# Patient Record
Sex: MALE | Race: WHITE | Employment: OTHER | URBAN - METROPOLITAN AREA
[De-identification: names, ages, dates, MRNs, and addresses within clinical notes are randomized per-mention and may not be internally consistent; named-entity substitution may affect disease eponyms.]

---

## 2016-12-30 RX ORDER — SODIUM CHLORIDE 9 MG/ML
100 INJECTION, SOLUTION INTRAVENOUS CONTINUOUS
Status: DISPENSED | OUTPATIENT
Start: 2017-01-05 | End: 2017-01-05

## 2016-12-30 RX ORDER — ACETAMINOPHEN 325 MG/1
650 TABLET ORAL
Status: ACTIVE | OUTPATIENT
Start: 2017-01-05 | End: 2017-01-05

## 2016-12-30 RX ORDER — DEXAMETHASONE SODIUM PHOSPHATE 4 MG/ML
12 INJECTION, SOLUTION INTRA-ARTICULAR; INTRALESIONAL; INTRAMUSCULAR; INTRAVENOUS; SOFT TISSUE ONCE
Status: ACTIVE | OUTPATIENT
Start: 2017-01-05 | End: 2017-01-06

## 2016-12-30 RX ORDER — PALONOSETRON 0.05 MG/ML
0.25 INJECTION, SOLUTION INTRAVENOUS ONCE
Status: ACTIVE | OUTPATIENT
Start: 2017-01-05 | End: 2017-01-06

## 2016-12-30 RX ORDER — DIPHENHYDRAMINE HYDROCHLORIDE 50 MG/ML
50 INJECTION, SOLUTION INTRAMUSCULAR; INTRAVENOUS
Status: ACTIVE | OUTPATIENT
Start: 2017-01-05 | End: 2017-01-05

## 2016-12-30 RX ORDER — DIPHENHYDRAMINE HYDROCHLORIDE 50 MG/ML
50 INJECTION, SOLUTION INTRAMUSCULAR; INTRAVENOUS ONCE
Status: ACTIVE | OUTPATIENT
Start: 2017-01-05 | End: 2017-01-05

## 2016-12-30 RX ORDER — ACETAMINOPHEN 325 MG/1
650 TABLET ORAL ONCE
Status: ACTIVE | OUTPATIENT
Start: 2017-01-05 | End: 2017-01-05

## 2017-01-02 RX ORDER — DEXAMETHASONE SODIUM PHOSPHATE 4 MG/ML
12 INJECTION, SOLUTION INTRA-ARTICULAR; INTRALESIONAL; INTRAMUSCULAR; INTRAVENOUS; SOFT TISSUE ONCE
Status: ACTIVE | OUTPATIENT
Start: 2017-01-06 | End: 2017-01-06

## 2017-01-02 RX ORDER — SODIUM CHLORIDE 9 MG/ML
100 INJECTION, SOLUTION INTRAVENOUS CONTINUOUS
Status: DISPENSED | OUTPATIENT
Start: 2017-01-06 | End: 2017-01-06

## 2017-01-05 ENCOUNTER — OFFICE VISIT (OUTPATIENT)
Dept: ONCOLOGY | Age: 72
End: 2017-01-05

## 2017-01-05 ENCOUNTER — HOSPITAL ENCOUNTER (OUTPATIENT)
Dept: INFUSION THERAPY | Age: 72
Discharge: HOME | End: 2017-01-05
Payer: MEDICARE

## 2017-01-05 VITALS
DIASTOLIC BLOOD PRESSURE: 87 MMHG | SYSTOLIC BLOOD PRESSURE: 149 MMHG | BODY MASS INDEX: 31.19 KG/M2 | OXYGEN SATURATION: 95 % | TEMPERATURE: 95.6 F | HEART RATE: 67 BPM | WEIGHT: 222.8 LBS | RESPIRATION RATE: 20 BRPM | HEIGHT: 71 IN

## 2017-01-05 VITALS
HEART RATE: 64 BPM | RESPIRATION RATE: 18 BRPM | BODY MASS INDEX: 31.36 KG/M2 | DIASTOLIC BLOOD PRESSURE: 82 MMHG | WEIGHT: 224 LBS | TEMPERATURE: 96.8 F | SYSTOLIC BLOOD PRESSURE: 139 MMHG | HEIGHT: 71 IN

## 2017-01-05 DIAGNOSIS — D69.6 THROMBOCYTOPENIA (HCC): ICD-10-CM

## 2017-01-05 DIAGNOSIS — C83.18 MANTLE CELL LYMPHOMA OF LYMPH NODES OF MULTIPLE REGIONS (HCC): Primary | ICD-10-CM

## 2017-01-05 DIAGNOSIS — I82.403: ICD-10-CM

## 2017-01-05 DIAGNOSIS — D64.9 ANEMIA, UNSPECIFIED TYPE: ICD-10-CM

## 2017-01-05 DIAGNOSIS — R60.0 BILATERAL EDEMA OF LOWER EXTREMITY: ICD-10-CM

## 2017-01-05 LAB
ALBUMIN SERPL BCP-MCNC: 2.9 G/DL (ref 3.5–5)
ALBUMIN/GLOB SERPL: 0.8 {RATIO} (ref 1.1–2.2)
ALP SERPL-CCNC: 86 U/L (ref 45–117)
ALT SERPL-CCNC: 8 U/L (ref 12–78)
ANION GAP BLD CALC-SCNC: 7 MMOL/L (ref 5–15)
AST SERPL W P-5'-P-CCNC: 16 U/L (ref 15–37)
BASOPHILS # BLD AUTO: 0 K/UL (ref 0–0.1)
BASOPHILS # BLD: 0 % (ref 0–1)
BILIRUB SERPL-MCNC: 0.5 MG/DL (ref 0.2–1)
BUN SERPL-MCNC: 20 MG/DL (ref 6–20)
BUN/CREAT SERPL: 23 (ref 12–20)
CALCIUM SERPL-MCNC: 8 MG/DL (ref 8.5–10.1)
CHLORIDE SERPL-SCNC: 106 MMOL/L (ref 97–108)
CO2 SERPL-SCNC: 28 MMOL/L (ref 21–32)
CREAT SERPL-MCNC: 0.88 MG/DL (ref 0.7–1.3)
EOSINOPHIL # BLD: 0.1 K/UL (ref 0–0.4)
EOSINOPHIL NFR BLD: 3 % (ref 0–7)
ERYTHROCYTE [DISTWIDTH] IN BLOOD BY AUTOMATED COUNT: 16.2 % (ref 11.5–14.5)
GLOBULIN SER CALC-MCNC: 3.6 G/DL (ref 2–4)
GLUCOSE SERPL-MCNC: 86 MG/DL (ref 65–100)
HCT VFR BLD AUTO: 33.2 % (ref 36.6–50.3)
HGB BLD-MCNC: 10.5 G/DL (ref 12.1–17)
LYMPHOCYTES # BLD AUTO: 33 % (ref 12–49)
LYMPHOCYTES # BLD: 0.9 K/UL (ref 0.8–3.5)
MCH RBC QN AUTO: 26.8 PG (ref 26–34)
MCHC RBC AUTO-ENTMCNC: 31.6 G/DL (ref 30–36.5)
MCV RBC AUTO: 84.7 FL (ref 80–99)
MONOCYTES # BLD: 0.2 K/UL (ref 0–1)
MONOCYTES NFR BLD AUTO: 9 % (ref 5–13)
NEUTS SEG # BLD: 1.4 K/UL (ref 1.8–8)
NEUTS SEG NFR BLD AUTO: 55 % (ref 32–75)
PLATELET # BLD AUTO: 71 K/UL (ref 150–400)
POTASSIUM SERPL-SCNC: 4.2 MMOL/L (ref 3.5–5.1)
PROT SERPL-MCNC: 6.5 G/DL (ref 6.4–8.2)
RBC # BLD AUTO: 3.92 M/UL (ref 4.1–5.7)
SODIUM SERPL-SCNC: 141 MMOL/L (ref 136–145)
WBC # BLD AUTO: 2.6 K/UL (ref 4.1–11.1)

## 2017-01-05 PROCEDURE — 36415 COLL VENOUS BLD VENIPUNCTURE: CPT | Performed by: INTERNAL MEDICINE

## 2017-01-05 PROCEDURE — 77030012965 HC NDL HUBR BBMI -A

## 2017-01-05 PROCEDURE — 80053 COMPREHEN METABOLIC PANEL: CPT | Performed by: INTERNAL MEDICINE

## 2017-01-05 PROCEDURE — 74011250636 HC RX REV CODE- 250/636

## 2017-01-05 PROCEDURE — 99211 OFF/OP EST MAY X REQ PHY/QHP: CPT

## 2017-01-05 PROCEDURE — 74011000250 HC RX REV CODE- 250

## 2017-01-05 PROCEDURE — 85025 COMPLETE CBC W/AUTO DIFF WBC: CPT | Performed by: INTERNAL MEDICINE

## 2017-01-05 RX ORDER — SODIUM CHLORIDE 9 MG/ML
10 INJECTION INTRAMUSCULAR; INTRAVENOUS; SUBCUTANEOUS AS NEEDED
Status: ACTIVE | OUTPATIENT
Start: 2017-01-05 | End: 2017-01-06

## 2017-01-05 RX ORDER — SODIUM CHLORIDE 0.9 % (FLUSH) 0.9 %
10 SYRINGE (ML) INJECTION AS NEEDED
Status: ACTIVE | OUTPATIENT
Start: 2017-01-05 | End: 2017-01-06

## 2017-01-05 RX ORDER — HEPARIN 100 UNIT/ML
500 SYRINGE INTRAVENOUS AS NEEDED
Status: ACTIVE | OUTPATIENT
Start: 2017-01-05 | End: 2017-01-06

## 2017-01-05 RX ADMIN — Medication 10 ML: at 10:53

## 2017-01-05 RX ADMIN — SODIUM CHLORIDE 10 ML: 9 INJECTION, SOLUTION INTRAMUSCULAR; INTRAVENOUS; SUBCUTANEOUS at 10:50

## 2017-01-05 RX ADMIN — Medication 10 ML: at 12:00

## 2017-01-05 RX ADMIN — Medication 10 ML: at 10:52

## 2017-01-05 RX ADMIN — SODIUM CHLORIDE, PRESERVATIVE FREE 500 UNITS: 5 INJECTION INTRAVENOUS at 12:00

## 2017-01-05 NOTE — MR AVS SNAPSHOT
Visit Information Date & Time Provider Department Dept. Phone Encounter #  
 1/5/2017  9:45 AM Kiersten Billy NP [address] at [address] [place] [address] [phone] [id number] Follow-up Instructions Return in 4 weeks (on 2/2/2017) for roscoe Shin R-Ben #3. Your Appointments 2/2/2017  9:15 AM  
ESTABLISHED PATIENT with Kiersten Billy NP  
[facility] at [address] [place] [address] [facility] [facility]) Appt Note: roscoe Shin R-Ben #3  
 [facility], [address] [facility] 99 [id number]  
[phone]  
  
   
 [facility], [address] [place] [address] Upcoming Health Maintenance Date Due DTaP/Tdap/Td series (1 - Tdap) 7/5/1964 ZOSTER VACCINE AGE 60> 7/5/2003 GLAUCOMA SCREENING Q2Y 7/5/2008 Pneumococcal 65+ High/Highest Risk (1 of 2 - PCV13) 7/5/2008 MEDICARE YEARLY EXAM 7/5/2008 INFLUENZA AGE 9 TO ADULT 8/1/2016 FOBT Q 1 YEAR AGE 50-75 11/11/2017 Allergies as of 1/5/2017  Review Complete On: 1/5/2017 By: Kiersten Billy NP No Known Allergies Current Immunizations  Reviewed on 12/9/2016 No immunizations on file. Not reviewed this visit You Were Diagnosed With   
  
 Codes Comments Mantle cell lymphoma of lymph nodes of multiple regions [facility])    -  Primary ICD-10-CM: C83.18 
ICD-9-CM: 200.48 Thromboembolism of deep veins of lower extremity, bilateral (HCC)     ICD-10-CM: I82.403 ICD-9-CM: 453.40 Thrombocytopenia ([facility] [address]     ICD-10-CM: D69.6 ICD-9-CM: 287.5 Anemia, unspecified type     ICD-10-CM: D64.9 ICD-9-CM: 285.9 Bilateral edema of lower extremity     ICD-10-CM: R60.0 ICD-9-CM: 559. 3 Vitals BP Pulse Temp Resp Height(growth percentile) 149/87 (BP 1 Location: Right arm, BP Patient Position: Sitting) 67 95.6 °F (35.3 °C) (Temporal) 20 5' 11\" (1.803 m) Weight(growth percentile) SpO2 BMI Smoking Status 222 lb 12.8 oz (101.1 kg) 95% 31.07 kg/m2 Former Smoker BMI and BSA Data Body Mass Index Body Surface Area 31.07 kg/m 2 2.25 m 2 Preferred Pharmacy Pharmacy Name Phone [facility], [address] [phone] Your Updated Medication List  
  
   
This list is accurate as of: 1/5/17 10:24 AM.  Always use your most recent med list.  
  
  
  
  
 enoxaparin 120 mg/0.8 mL injection Commonly known as:  LOVENOX  
120 mg by SubCUTAneous route every twelve (12) hours. escitalopram oxalate 10 mg tablet Commonly known as:  Wyn Glenna Take 1 Tab by mouth daily. ferrous sulfate 325 mg (65 mg iron) tablet Take 1 Tab by mouth two (2) times daily (with meals). lidocaine-prilocaine topical cream  
Commonly known as:  EMLA Apply  to affected area as needed for Pain (Apply 30-60 min. prior to having your port accessed). ondansetron hcl 8 mg tablet Commonly known as:  Maxwell Line Take 1 Tab by mouth every eight (8) hours as needed for Nausea. prochlorperazine 10 mg tablet Commonly known as:  COMPAZINE Take 1 Tab by mouth every six (6) hours as needed for Nausea. Follow-up Instructions Return in 4 weeks (on 2/2/2017) for roscoe Shin R-Ben #3. To-Do List   
 01/05/2017 10:30 AM  
  Appointment with [address] at [place] [address] ([phone])  01/06/2017 11:00 AM  
  Appointment with [address] at [place] [address] ([phone])  
  
 02/02/2017 10:00 AM  
  Appointment with [address] at [place] [address] ([phone])  
  
 02/03/2017 10:00 AM  
  Appointment with [address] at [place] [address] ([phone])  
  
 03/02/2017 10:00 AM  
  Appointment with [address] at [place] [address] ([phone])  
  
 03/03/2017 10:00 AM  
 Appointment with [address] at [place] [address] ([phone]) Introducing [facility] SERVICES! Dear Aston Lance: Thank you for requesting a [employer] account. Our records indicate that you already have an active [employer] account. You can access your account anytime at [email or web address] Did you know that you can access your hospital and ER discharge instructions at any time in [employer]? You can also review all of your test results from your hospital stay or ER visit. Additional Information If you have questions, please visit the Frequently Asked Questions section of the [employer] website at [email or web address]. Remember, [employer] is NOT to be used for urgent needs. For medical emergencies, dial 911. Now available from your iPhone and Android! Please provide this summary of care documentation to your next provider. Your primary care clinician is listed as Martín Carl. If you have any questions after today's visit, please call [phone].

## 2017-01-05 NOTE — PROGRESS NOTES
[facility] VISIT NOTE    4882  Pt arrived at [facility] ambulatory and in no distress for C2D1 Rituxan + Bendamustine. Patient saw Dr. Zahida Avery prior to [facility] appointment. Assessment completed, pt c/o mild fatigue, but no other complaints and he says he tolerated the first cycle well. Blood pressure 139/82, pulse 64, temperature 96.8 °F (36 °C), resp. rate 18, height 5' 11\" (1.803 m), weight 101.6 kg (224 lb). Right chest port accessed with 1 in guallpa no difficulty. Positive blood return noted and labs drawn. Recent Results (from the past 12 hour(s))   CBC WITH AUTOMATED DIFF    Collection Time: 01/05/17 10:51 AM   Result Value Ref Range    WBC 2.6 (L) 4.1 - 11.1 K/uL    RBC 3.92 (L) 4.10 - 5.70 M/uL    HGB 10.5 (L) 12.1 - 17.0 g/dL    HCT 33.2 (L) 36.6 - 50.3 %    MCV 84.7 80.0 - 99.0 FL    MCH 26.8 26.0 - 34.0 PG    MCHC 31.6 30.0 - 36.5 g/dL    RDW 16.2 (H) 11.5 - 14.5 %    PLATELET 71 (L) 843 - 400 K/uL    NEUTROPHILS 55 32 - 75 %    LYMPHOCYTES 33 12 - 49 %    MONOCYTES 9 5 - 13 %    EOSINOPHILS 3 0 - 7 %    BASOPHILS 0 0 - 1 %    ABS. NEUTROPHILS 1.4 (L) 1.8 - 8.0 K/UL    ABS. LYMPHOCYTES 0.9 0.8 - 3.5 K/UL    ABS. MONOCYTES 0.2 0.0 - 1.0 K/UL    ABS. EOSINOPHILS 0.1 0.0 - 0.4 K/UL    ABS. BASOPHILS 0.0 0.0 - 0.1 K/UL   METABOLIC PANEL, COMPREHENSIVE    Collection Time: 01/05/17 10:51 AM   Result Value Ref Range    Sodium 141 136 - 145 mmol/L    Potassium 4.2 3.5 - 5.1 mmol/L    Chloride 106 97 - 108 mmol/L    CO2 28 21 - 32 mmol/L    Anion gap 7 5 - 15 mmol/L    Glucose 86 65 - 100 mg/dL    BUN 20 6 - 20 MG/DL    Creatinine 0.88 0.70 - 1.30 MG/DL    BUN/Creatinine ratio 23 (H) 12 - 20      GFR est AA >60 >60 ml/min/1.73m2    GFR est non-AA >60 >60 ml/min/1.73m2    Calcium 8.0 (L) 8.5 - 10.1 MG/DL    Bilirubin, total 0.5 0.2 - 1.0 MG/DL    ALT 8 (L) 12 - 78 U/L    AST 16 15 - 37 U/L    Alk.  phosphatase 86 45 - 117 U/L    Protein, total 6.5 6.4 - 8.2 g/dL    Albumin 2.9 (L) 3.5 - 5.0 g/dL    Globulin 3.6 2.0 - 4.0 g/dL    A-G Ratio 0.8 (L) 1.1 - 2.2       Discussed low platelet result with Héctor Yao RN for Dr. Anna Arriaga and the plan is to hold C2D1 today and retry in one week. Port de-accessed and flushed per protocol. Positive blood return noted. 1200  D/C'd from [facility] ambulatory and in no distress accompanied by his daughter.  Next appointment is 1/6/16 at 11am.

## 2017-01-05 NOTE — PROGRESS NOTES
[id number] [facility] at [facility]  [phone]    Hematology / Oncology Follow Up    Reason for Visit:   Alal Schaefer is a 66 y.o. male who is seen for follow up of lymphoma. Treatment History:   · CTA Chest 11/11/2016: PE in left lower lobe pulmonary arteries, extensive adneopathy  · CT A/P 11/12/2016: Cirrhosis, massive splenomegaly, ascites, massive lymphadenopathy  · US guided axillary node biopsy 11/14/2016: CD5+ B-cell non-hodgkin lymphoma, phenotype most consistent with mantle cell lymphoma. FISH with t(11;14)  · PET-CT 11/23/2016: Marked splenomegaly with increased metabolic activity consistent with lymphoma. Bilateral cervical and axillary adenopathy. Mediastinal and right hilar and left diaphragmatic adenopathy. Bilateral pelvic and inguinal adenopathy. Multiple small mesenteric and retroperitoneal nodes with low grade or no activity. · BMBx 11/29/2016: Hypercellular marrow involved by mantle cell lymphoma  · Stage III Mantle Cell Lymphoma  · Palliative chemotherapy with Rituximab and Bendamustine beginning 12/8/2016    History of Present Illness:   Here today for follow up. He states he is feeling well. He reports tolerating therapy very well. Minimal fatigue for a few days after treatment, no other symptoms. Leg swelling slightly better. Eating and drinking well. Breathing well. Denies fevers, chills, night sweats. Continues on lovenox without bleeding. No complaints today. PAST HISTORY: The following sections were reviewed and updated in the EMR as appropriate: PMH, SH, FH, Medications, Allergies. No Known Allergies   Review of Systems: A complete review of systems was obtained, reviewed, and scanned into the EMR. Pertinent findings reviewed above.       Physical Exam:     Visit Vitals    /87 (BP 1 Location: Right arm, BP Patient Position: Sitting)    Pulse 67    Temp 95.6 °F (35.3 °C) (Temporal)    Resp 20    Ht 5' 11\" (1.803 m)    Wt 222 lb 12.8 oz (101.1 kg)    SpO2 95%    BMI 31.07 kg/m2     ECOG PS: 1  General: No distress  Eyes: PERRLA, anicteric sclerae  HENT: Atraumatic, OP clear  Neck: Supple  Lymphatic: No cervical, supraclavicular, or inguinal adenopathy  Respiratory: CTAB, normal respiratory effort  CV: Normal rate, regular rhythm, no murmurs. Bilateral LE edema, 2+  GI: Soft, nontender, nondistended, no masses, no hepatomegaly, no splenomegaly  MS: Normal gait and station. Digits without clubbing or cyanosis. Skin: No rashes, ecchymoses, or petechiae. Normal temperature, turgor, and texture. Psych: Alert, oriented, appropriate affect, normal judgment/insight    Results:     Lab Results   Component Value Date/Time    WBC 2.6 01/05/2017 10:51 AM    HGB 10.5 01/05/2017 10:51 AM    HCT 33.2 01/05/2017 10:51 AM    PLATELET 71 97/52/7472 10:51 AM    MCV 84.7 01/05/2017 10:51 AM    ABS. NEUTROPHILS 1.4 01/05/2017 10:51 AM    Hemoglobin (POC) 10.2 12/08/2016 10:15 AM    Hematocrit (POC) 30 12/08/2016 10:15 AM     Lab Results   Component Value Date/Time    Sodium 141 01/05/2017 10:51 AM    Potassium 4.2 01/05/2017 10:51 AM    Chloride 106 01/05/2017 10:51 AM    CO2 28 01/05/2017 10:51 AM    Glucose 86 01/05/2017 10:51 AM    BUN 20 01/05/2017 10:51 AM    Creatinine 0.88 01/05/2017 10:51 AM    GFR est AA >60 01/05/2017 10:51 AM    GFR est non-AA >60 01/05/2017 10:51 AM    Calcium 8.0 01/05/2017 10:51 AM    Sodium (POC) 140 12/08/2016 10:15 AM    Potassium (POC) 4.0 12/08/2016 10:15 AM    Chloride (POC) 100 12/08/2016 10:15 AM    Glucose (POC) 75 12/08/2016 10:15 AM    BUN (POC) 17 12/08/2016 10:15 AM    Creatinine (POC) 1.0 12/08/2016 10:15 AM    Calcium, ionized (POC) 1.10 12/08/2016 10:15 AM     Lab Results   Component Value Date/Time    Bilirubin, total 0.5 01/05/2017 10:51 AM    ALT 8 01/05/2017 10:51 AM    AST 16 01/05/2017 10:51 AM    Alk.  phosphatase 86 01/05/2017 10:51 AM    Protein, total 6.5 01/05/2017 10:51 AM    Albumin 2.9 01/05/2017 10:51 AM    Globulin 3.6 01/05/2017 10:51 AM     Lab Results   Component Value Date/Time    Reticulocyte count 1.8 11/10/2016 02:00 PM    Iron % saturation 11 11/10/2016 02:00 PM    Iron 26 11/10/2016 02:00 PM    TIBC 240 11/10/2016 02:00 PM    Ferritin 100 11/10/2016 02:00 PM    Vitamin B12 415 11/10/2016 02:00 PM    Folate 15.6 11/10/2016 02:00 PM    Haptoglobin 186 11/11/2016 12:04 PM     11/10/2016 02:00 PM    Beta-2 Microglobulin, serum 4.4 11/15/2016 05:21 AM    TSH 4.06 11/10/2016 11:21 AM    Hep C  virus Ab Interp. NONREACTIVE 08/02/2016 08:54 AM     Lab Results   Component Value Date/Time    INR 1.1 11/11/2016 12:04 PM    aPTT 31.3 11/11/2016 12:04 PM    Fibrinogen 215 11/11/2016 12:04 PM       HepB/C negative  TTE 11/10/2016: EF 60%    Bone marrow biopsy 11/29/2016: Hypercellular marrow involved by mantle cell lymphoma    Assessment:   1) Mantle cell lymphoma  Stage IV  His cancer is not curable and management is with palliative intent. Bone marrow results are positive for marrow involvement, increasing this to stage IV. His MIPI score is 6.24, which is High Risk and cooresponds to a median survival of 37 months and 5-year OS of 20%. He is currently on chemotherapy with R-Bendamustine with plans for 6 cycles, potentially followed by maintenance rituximab. He tolerated first cycle very well. However, worsening thrombocytopenia now. Not sure how much of this is due to treatment vs his splenomegaly and cirrhosis. We will hold therapy today and try again next week, may need to treat regardless of counts if still low. 2) Depression  Stable. Continue lexapro. 3) Cirrhosis  Newly diagnosed. Unclear if this is related to the lymphoma. GI following. 4) DVT, PE  Diagnosed 11/11/2016. Malignancy associated. Continue lovenox bid. Symptoms improving. 5) Anemia  Secondary to #1, and perhaps iron deficiency. Improving on oral iron, continue at once a day. Monitor, repeat labs today.     6) Thrombocytopenia  Secondary to splenomegaly. Monitor and treat lymphoma.      Plan:     · Delay C#2 R-Bendamustine to next week  · Labs prior to each treatment: CBC with diff, CMP on day 1  · Antiemetic prophylaxis: Ondansetron and Dexamethasone on day 1 and 2  · Infusion reaction prophylaxis: acetaminophen and diphenhydramine prior to Rituximab  · PRN antiemetics at home: Ondansetron, Prochlorparazine  · Neulasta 24-72 hours after each cycle  · Continue iron supplement  · Continue lovenox 1mg/kig BID  · Return to clinic in 5 weeks with next cycle of therapy      Signed By: Pattie Gibson MD     January 5, 2017

## 2017-01-06 ENCOUNTER — HOSPITAL ENCOUNTER (OUTPATIENT)
Dept: INFUSION THERAPY | Age: 72
Discharge: HOME | End: 2017-01-06
Payer: MEDICARE

## 2017-01-09 RX ORDER — DIPHENHYDRAMINE HYDROCHLORIDE 50 MG/ML
50 INJECTION, SOLUTION INTRAMUSCULAR; INTRAVENOUS
Status: ACTIVE | OUTPATIENT
Start: 2017-01-12 | End: 2017-01-12

## 2017-01-09 RX ORDER — DEXAMETHASONE SODIUM PHOSPHATE 4 MG/ML
12 INJECTION, SOLUTION INTRA-ARTICULAR; INTRALESIONAL; INTRAMUSCULAR; INTRAVENOUS; SOFT TISSUE ONCE
Status: COMPLETED | OUTPATIENT
Start: 2017-01-12 | End: 2017-01-12

## 2017-01-09 RX ORDER — DIPHENHYDRAMINE HYDROCHLORIDE 50 MG/ML
50 INJECTION, SOLUTION INTRAMUSCULAR; INTRAVENOUS ONCE
Status: COMPLETED | OUTPATIENT
Start: 2017-01-12 | End: 2017-01-12

## 2017-01-09 RX ORDER — ACETAMINOPHEN 325 MG/1
650 TABLET ORAL
Status: ACTIVE | OUTPATIENT
Start: 2017-01-12 | End: 2017-01-12

## 2017-01-09 RX ORDER — SODIUM CHLORIDE 9 MG/ML
100 INJECTION, SOLUTION INTRAVENOUS CONTINUOUS
Status: DISPENSED | OUTPATIENT
Start: 2017-01-13 | End: 2017-01-13

## 2017-01-09 RX ORDER — PALONOSETRON 0.05 MG/ML
0.25 INJECTION, SOLUTION INTRAVENOUS ONCE
Status: COMPLETED | OUTPATIENT
Start: 2017-01-12 | End: 2017-01-12

## 2017-01-09 RX ORDER — SODIUM CHLORIDE 9 MG/ML
100 INJECTION, SOLUTION INTRAVENOUS CONTINUOUS
Status: DISPENSED | OUTPATIENT
Start: 2017-01-12 | End: 2017-01-12

## 2017-01-09 RX ORDER — DEXAMETHASONE SODIUM PHOSPHATE 4 MG/ML
12 INJECTION, SOLUTION INTRA-ARTICULAR; INTRALESIONAL; INTRAMUSCULAR; INTRAVENOUS; SOFT TISSUE ONCE
Status: COMPLETED | OUTPATIENT
Start: 2017-01-13 | End: 2017-01-13

## 2017-01-09 RX ORDER — ACETAMINOPHEN 325 MG/1
650 TABLET ORAL ONCE
Status: COMPLETED | OUTPATIENT
Start: 2017-01-12 | End: 2017-01-12

## 2017-01-12 ENCOUNTER — HOSPITAL ENCOUNTER (OUTPATIENT)
Dept: INFUSION THERAPY | Age: 72
Discharge: HOME | End: 2017-01-12
Payer: MEDICARE

## 2017-01-12 VITALS
OXYGEN SATURATION: 90 % | BODY MASS INDEX: 31.67 KG/M2 | HEART RATE: 61 BPM | HEIGHT: 71 IN | TEMPERATURE: 97.2 F | DIASTOLIC BLOOD PRESSURE: 77 MMHG | WEIGHT: 226.2 LBS | RESPIRATION RATE: 18 BRPM | SYSTOLIC BLOOD PRESSURE: 120 MMHG

## 2017-01-12 LAB
ALBUMIN SERPL BCP-MCNC: 2.7 G/DL (ref 3.5–5)
ALBUMIN/GLOB SERPL: 0.8 {RATIO} (ref 1.1–2.2)
ALP SERPL-CCNC: 85 U/L (ref 45–117)
ALT SERPL-CCNC: 15 U/L (ref 12–78)
ANION GAP BLD CALC-SCNC: 18 MMOL/L (ref 5–15)
AST SERPL W P-5'-P-CCNC: 15 U/L (ref 15–37)
BASOPHILS # BLD AUTO: 0 K/UL (ref 0–0.1)
BASOPHILS # BLD: 1 % (ref 0–1)
BILIRUB DIRECT SERPL-MCNC: 0.1 MG/DL (ref 0–0.2)
BILIRUB SERPL-MCNC: 0.3 MG/DL (ref 0.2–1)
BUN BLD-MCNC: 23 MG/DL (ref 9–20)
CA-I BLD-MCNC: 1.19 MMOL/L (ref 1.12–1.32)
CHLORIDE BLD-SCNC: 104 MMOL/L (ref 98–107)
CO2 BLD-SCNC: 26 MMOL/L (ref 21–32)
CREAT BLD-MCNC: 0.8 MG/DL (ref 0.6–1.3)
DIFFERENTIAL METHOD BLD: ABNORMAL
EOSINOPHIL # BLD: 0.1 K/UL (ref 0–0.4)
EOSINOPHIL NFR BLD: 7 % (ref 0–7)
ERYTHROCYTE [DISTWIDTH] IN BLOOD BY AUTOMATED COUNT: 16.7 % (ref 11.5–14.5)
GLOBULIN SER CALC-MCNC: 3.6 G/DL (ref 2–4)
GLUCOSE BLD-MCNC: 156 MG/DL (ref 75–110)
HCT VFR BLD AUTO: 34.1 % (ref 36.6–50.3)
HCT VFR BLD CALC: 32 % (ref 36.6–50.3)
HGB BLD-MCNC: 10.1 G/DL (ref 12.1–17)
HGB BLD-MCNC: 10.9 GM/DL (ref 12.1–17)
LYMPHOCYTES # BLD AUTO: 36 % (ref 12–49)
LYMPHOCYTES # BLD: 0.7 K/UL (ref 0.8–3.5)
MCH RBC QN AUTO: 25.9 PG (ref 26–34)
MCHC RBC AUTO-ENTMCNC: 29.6 G/DL (ref 30–36.5)
MCV RBC AUTO: 87.4 FL (ref 80–99)
MONOCYTES # BLD: 0.2 K/UL (ref 0–1)
MONOCYTES NFR BLD AUTO: 8 % (ref 5–13)
NEUTS SEG # BLD: 1 K/UL (ref 1.8–8)
NEUTS SEG NFR BLD AUTO: 48 % (ref 32–75)
PLATELET # BLD AUTO: 154 K/UL (ref 150–400)
POTASSIUM BLD-SCNC: 4 MMOL/L (ref 3.5–5.1)
PROT SERPL-MCNC: 6.3 G/DL (ref 6.4–8.2)
RBC # BLD AUTO: 3.9 M/UL (ref 4.1–5.7)
RBC MORPH BLD: ABNORMAL
RBC MORPH BLD: ABNORMAL
SERVICE CMNT-IMP: ABNORMAL
SODIUM BLD-SCNC: 143 MMOL/L (ref 136–145)
WBC # BLD AUTO: 2 K/UL (ref 4.1–11.1)

## 2017-01-12 PROCEDURE — 74011000250 HC RX REV CODE- 250: Performed by: INTERNAL MEDICINE

## 2017-01-12 PROCEDURE — 80076 HEPATIC FUNCTION PANEL: CPT | Performed by: INTERNAL MEDICINE

## 2017-01-12 PROCEDURE — 96413 CHEMO IV INFUSION 1 HR: CPT

## 2017-01-12 PROCEDURE — 74011250637 HC RX REV CODE- 250/637: Performed by: INTERNAL MEDICINE

## 2017-01-12 PROCEDURE — 74011250636 HC RX REV CODE- 250/636

## 2017-01-12 PROCEDURE — 96411 CHEMO IV PUSH ADDL DRUG: CPT

## 2017-01-12 PROCEDURE — 96409 CHEMO IV PUSH SNGL DRUG: CPT

## 2017-01-12 PROCEDURE — 74011250636 HC RX REV CODE- 250/636: Performed by: INTERNAL MEDICINE

## 2017-01-12 PROCEDURE — 85025 COMPLETE CBC W/AUTO DIFF WBC: CPT | Performed by: INTERNAL MEDICINE

## 2017-01-12 PROCEDURE — 77030012965 HC NDL HUBR BBMI -A

## 2017-01-12 PROCEDURE — 36415 COLL VENOUS BLD VENIPUNCTURE: CPT | Performed by: INTERNAL MEDICINE

## 2017-01-12 PROCEDURE — 74011000258 HC RX REV CODE- 258: Performed by: INTERNAL MEDICINE

## 2017-01-12 PROCEDURE — 80047 BASIC METABLC PNL IONIZED CA: CPT

## 2017-01-12 PROCEDURE — 96375 TX/PRO/DX INJ NEW DRUG ADDON: CPT

## 2017-01-12 RX ORDER — SODIUM CHLORIDE 9 MG/ML
10 INJECTION INTRAMUSCULAR; INTRAVENOUS; SUBCUTANEOUS AS NEEDED
Status: DISPENSED | OUTPATIENT
Start: 2017-01-12 | End: 2017-01-13

## 2017-01-12 RX ORDER — HEPARIN 100 UNIT/ML
500 SYRINGE INTRAVENOUS AS NEEDED
Status: ACTIVE | OUTPATIENT
Start: 2017-01-12 | End: 2017-01-13

## 2017-01-12 RX ORDER — SODIUM CHLORIDE 0.9 % (FLUSH) 0.9 %
10-40 SYRINGE (ML) INJECTION AS NEEDED
Status: ACTIVE | OUTPATIENT
Start: 2017-01-12 | End: 2017-01-13

## 2017-01-12 RX ADMIN — SODIUM CHLORIDE 100 ML/HR: 900 INJECTION, SOLUTION INTRAVENOUS at 09:26

## 2017-01-12 RX ADMIN — BENDAMUSTINE HYDROCHLORIDE 210 MG: 25 INJECTION, SOLUTION INTRAVENOUS at 12:30

## 2017-01-12 RX ADMIN — PALONOSETRON HYDROCHLORIDE 0.25 MG: 0.25 INJECTION INTRAVENOUS at 09:28

## 2017-01-12 RX ADMIN — DIPHENHYDRAMINE HYDROCHLORIDE 50 MG: 50 INJECTION INTRAMUSCULAR; INTRAVENOUS at 09:32

## 2017-01-12 RX ADMIN — Medication 10 ML: at 12:55

## 2017-01-12 RX ADMIN — RITUXIMAB 875 MG: 10 INJECTION, SOLUTION INTRAVENOUS at 10:50

## 2017-01-12 RX ADMIN — ACETAMINOPHEN 650 MG: 325 TABLET ORAL at 09:29

## 2017-01-12 RX ADMIN — HEPARIN 500 UNITS: 100 SYRINGE at 12:55

## 2017-01-12 RX ADMIN — DEXAMETHASONE SODIUM PHOSPHATE 12 MG: 4 INJECTION, SOLUTION INTRA-ARTICULAR; INTRALESIONAL; INTRAMUSCULAR; INTRAVENOUS; SOFT TISSUE at 09:40

## 2017-01-12 RX ADMIN — SODIUM CHLORIDE 10 ML: 9 INJECTION, SOLUTION INTRAMUSCULAR; INTRAVENOUS; SUBCUTANEOUS at 09:36

## 2017-01-12 NOTE — PROGRESS NOTES
[facility] VISIT NOTE    0740  Pt arrived at [facility] ambulatory and in no distress for C2D1 Rituxan + Bendamustine. Assessment completed, pt  Has no new concern. Chemotherapy Flowsheet 1/12/2017   Cycle C2D1   Date 1/12/2017   Drug / Regimen Rituxan/Bendamuistine   Notes -       Right chest port accessed with 0.75 in guallpa with no difficulty. Positive blood return noted and labs drawn. Recent Results (from the past 12 hour(s))   HEPATIC FUNCTION PANEL    Collection Time: 01/12/17  7:59 AM   Result Value Ref Range    Protein, total 6.3 (L) 6.4 - 8.2 g/dL    Albumin 2.7 (L) 3.5 - 5.0 g/dL    Globulin 3.6 2.0 - 4.0 g/dL    A-G Ratio 0.8 (L) 1.1 - 2.2      Bilirubin, total 0.3 0.2 - 1.0 MG/DL    Bilirubin, direct 0.1 0.0 - 0.2 MG/DL    Alk. phosphatase 85 45 - 117 U/L    AST 15 15 - 37 U/L    ALT 15 12 - 78 U/L   CBC WITH AUTOMATED DIFF    Collection Time: 01/12/17  7:59 AM   Result Value Ref Range    WBC 2.0 (L) 4.1 - 11.1 K/uL    RBC 3.90 (L) 4.10 - 5.70 M/uL    HGB 10.1 (L) 12.1 - 17.0 g/dL    HCT 34.1 (L) 36.6 - 50.3 %    MCV 87.4 80.0 - 99.0 FL    MCH 25.9 (L) 26.0 - 34.0 PG    MCHC 29.6 (L) 30.0 - 36.5 g/dL    RDW 16.7 (H) 11.5 - 14.5 %    PLATELET 753 143 - 033 K/uL    NEUTROPHILS 48 32 - 75 %    LYMPHOCYTES 36 12 - 49 %    MONOCYTES 8 5 - 13 %    EOSINOPHILS 7 0 - 7 %    BASOPHILS 1 0 - 1 %    ABS. NEUTROPHILS 1.0 (L) 1.8 - 8.0 K/UL    ABS. LYMPHOCYTES 0.7 (L) 0.8 - 3.5 K/UL    ABS. MONOCYTES 0.2 0.0 - 1.0 K/UL    ABS. EOSINOPHILS 0.1 0.0 - 0.4 K/UL    ABS.  BASOPHILS 0.0 0.0 - 0.1 K/UL    DF SMEAR SCANNED      RBC COMMENTS ANISOCYTOSIS  1+        RBC COMMENTS OVALOCYTES  PRESENT       POC CHEM8    Collection Time: 01/12/17  8:02 AM   Result Value Ref Range    Calcium, ionized (POC) 1.19 1.12 - 1.32 MMOL/L    Sodium (POC) 143 136 - 145 MMOL/L    Potassium (POC) 4.0 3.5 - 5.1 MMOL/L    Chloride (POC) 104 98 - 107 MMOL/L    CO2 (POC) 26 21 - 32 MMOL/L    Anion gap (POC) 18 (H) 5 - 15 mmol/L    Glucose (POC) 156 (H) 75 - 110 MG/DL    BUN (POC) 23 (H) 9 - 20 MG/DL    Creatinine (POC) 0.8 0.6 - 1.3 MG/DL    GFR-AA (POC) >60 >60 ml/min/1.73m2    GFR, non-AA (POC) >60 >60 ml/min/1.73m2    Hemoglobin (POC) 10.9 (L) 12.1 - 17.0 GM/DL    Hematocrit (POC) 32 (L) 36.6 - 50.3 %    Comment Comment Not Indicated. Medications received:  Acetaminophen PO  Diphenhydramine IV  Palonosetron IV  Dexamethasone IV  Rituximab IV  Bendamustine IVPB    Tolerated treatment well, no adverse reaction noted. Portflushed per protocol and Wrapped. Positive blood return noted. Patient Vitals for the past 12 hrs:   Temp Pulse Resp BP SpO2   01/12/17 1258 97.2 °F (36.2 °C) 61 18 120/77 -   01/12/17 1220 97 °F (36.1 °C) (!) 55 16 132/82 -   01/12/17 1150 97.5 °F (36.4 °C) (!) 58 16 117/77 -   01/12/17 1120 97 °F (36.1 °C) 60 16 128/83 90 %   01/12/17 0742 97.4 °F (36.3 °C) 88 18 (!) 160/97 -       1300  D/C'd from [facility] ambulatory and in no distress accompanied by Daughter . Next appointment is 1/13/2017at 0730.

## 2017-01-13 ENCOUNTER — HOSPITAL ENCOUNTER (OUTPATIENT)
Dept: INFUSION THERAPY | Age: 72
Discharge: HOME | End: 2017-01-13
Payer: MEDICARE

## 2017-01-13 VITALS
TEMPERATURE: 97.7 F | SYSTOLIC BLOOD PRESSURE: 115 MMHG | RESPIRATION RATE: 18 BRPM | DIASTOLIC BLOOD PRESSURE: 73 MMHG | HEART RATE: 68 BPM | OXYGEN SATURATION: 94 %

## 2017-01-13 PROCEDURE — 96413 CHEMO IV INFUSION 1 HR: CPT

## 2017-01-13 PROCEDURE — 74011000258 HC RX REV CODE- 258: Performed by: INTERNAL MEDICINE

## 2017-01-13 PROCEDURE — 74011250636 HC RX REV CODE- 250/636

## 2017-01-13 PROCEDURE — 96375 TX/PRO/DX INJ NEW DRUG ADDON: CPT

## 2017-01-13 PROCEDURE — 74011250636 HC RX REV CODE- 250/636: Performed by: INTERNAL MEDICINE

## 2017-01-13 RX ORDER — HEPARIN 100 UNIT/ML
500 SYRINGE INTRAVENOUS AS NEEDED
Status: ACTIVE | OUTPATIENT
Start: 2017-01-13 | End: 2017-01-14

## 2017-01-13 RX ORDER — SODIUM CHLORIDE 0.9 % (FLUSH) 0.9 %
5-10 SYRINGE (ML) INJECTION AS NEEDED
Status: ACTIVE | OUTPATIENT
Start: 2017-01-13 | End: 2017-01-14

## 2017-01-13 RX ADMIN — SODIUM CHLORIDE 100 ML/HR: 900 INJECTION, SOLUTION INTRAVENOUS at 08:02

## 2017-01-13 RX ADMIN — BENDAMUSTINE HYDROCHLORIDE 210 MG: 25 INJECTION, SOLUTION INTRAVENOUS at 09:05

## 2017-01-13 RX ADMIN — Medication 10 ML: at 09:36

## 2017-01-13 RX ADMIN — HEPARIN 500 UNITS: 100 SYRINGE at 09:37

## 2017-01-13 RX ADMIN — DEXAMETHASONE SODIUM PHOSPHATE 12 MG: 4 INJECTION, SOLUTION INTRA-ARTICULAR; INTRALESIONAL; INTRAMUSCULAR; INTRAVENOUS; SOFT TISSUE at 08:02

## 2017-01-13 NOTE — PROGRESS NOTES
[facility] Progress Note    Date: 2017    Name: Ramiro Levi    MRN: [id number]         : 1945    Mr. Hernandez Arrived ambulatory and in no distress for Bendamustine cycle 2 day 2 of Rituxan/Bendamustine regimen. Assessment was completed, no acute issues at this time, no new complaints voiced. Port accessed 17, needle and dressing intact, brisk blood return. Chemotherapy Flowsheet 2017   Cycle C2D2   Date 2017   Drug / Regimen Rituxan/Bendamustine   Notes -     Patient Vitals for the past 12 hrs:   Temp Pulse Resp BP SpO2   17 0935 97.7 °F (36.5 °C) 68 18 115/73 94 %   17 0743 98.4 °F (36.9 °C) 73 22 129/76 93 %     Pre-medications  were administered as ordered and chemotherapy was initiated:  Dexamethasone 12 mg IV  Bendamustine 210 mg IV      Mr. Pradeep Almeida tolerated treatment well and was discharged from [place] [address] in stable condition at [address]. He is to return on 17 at 1100 for his next appointment.     Cesar Ovalle RN  2017

## 2017-01-13 NOTE — PROGRESS NOTES
Problem: Chemotherapy Treatment  Goal: *Chemotherapy regimen followed  Outcome: Progressing Towards Goal  Rituxan/Bendamustine C2D2. Pt denies any questions or concerns.

## 2017-02-02 ENCOUNTER — APPOINTMENT (OUTPATIENT)
Dept: INFUSION THERAPY | Age: 72
End: 2017-02-02
Payer: MEDICARE

## 2017-02-03 ENCOUNTER — APPOINTMENT (OUTPATIENT)
Dept: INFUSION THERAPY | Age: 72
End: 2017-02-03
Payer: MEDICARE

## 2017-02-06 RX ORDER — DEXAMETHASONE SODIUM PHOSPHATE 4 MG/ML
12 INJECTION, SOLUTION INTRA-ARTICULAR; INTRALESIONAL; INTRAMUSCULAR; INTRAVENOUS; SOFT TISSUE ONCE
Status: COMPLETED | OUTPATIENT
Start: 2017-02-09 | End: 2017-02-09

## 2017-02-06 RX ORDER — SODIUM CHLORIDE 9 MG/ML
100 INJECTION, SOLUTION INTRAVENOUS CONTINUOUS
Status: DISPENSED | OUTPATIENT
Start: 2017-02-09 | End: 2017-02-09

## 2017-02-06 RX ORDER — DIPHENHYDRAMINE HYDROCHLORIDE 50 MG/ML
50 INJECTION, SOLUTION INTRAMUSCULAR; INTRAVENOUS ONCE
Status: COMPLETED | OUTPATIENT
Start: 2017-02-09 | End: 2017-02-09

## 2017-02-06 RX ORDER — PALONOSETRON 0.05 MG/ML
0.25 INJECTION, SOLUTION INTRAVENOUS ONCE
Status: COMPLETED | OUTPATIENT
Start: 2017-02-09 | End: 2017-02-09

## 2017-02-06 RX ORDER — ACETAMINOPHEN 325 MG/1
650 TABLET ORAL
Status: ACTIVE | OUTPATIENT
Start: 2017-02-09 | End: 2017-02-09

## 2017-02-06 RX ORDER — ACETAMINOPHEN 325 MG/1
650 TABLET ORAL ONCE
Status: COMPLETED | OUTPATIENT
Start: 2017-02-09 | End: 2017-02-09

## 2017-02-06 RX ORDER — DIPHENHYDRAMINE HYDROCHLORIDE 50 MG/ML
50 INJECTION, SOLUTION INTRAMUSCULAR; INTRAVENOUS
Status: ACTIVE | OUTPATIENT
Start: 2017-02-09 | End: 2017-02-09

## 2017-02-07 RX ORDER — DEXAMETHASONE SODIUM PHOSPHATE 4 MG/ML
12 INJECTION, SOLUTION INTRA-ARTICULAR; INTRALESIONAL; INTRAMUSCULAR; INTRAVENOUS; SOFT TISSUE ONCE
Status: COMPLETED | OUTPATIENT
Start: 2017-02-10 | End: 2017-02-10

## 2017-02-07 RX ORDER — SODIUM CHLORIDE 9 MG/ML
100 INJECTION, SOLUTION INTRAVENOUS CONTINUOUS
Status: DISPENSED | OUTPATIENT
Start: 2017-02-10 | End: 2017-02-10

## 2017-02-09 ENCOUNTER — HOSPITAL ENCOUNTER (OUTPATIENT)
Dept: INFUSION THERAPY | Age: 72
Discharge: HOME | End: 2017-02-09
Payer: MEDICARE

## 2017-02-09 ENCOUNTER — OFFICE VISIT (OUTPATIENT)
Dept: ONCOLOGY | Age: 72
End: 2017-02-09

## 2017-02-09 VITALS
TEMPERATURE: 97 F | OXYGEN SATURATION: 94 % | SYSTOLIC BLOOD PRESSURE: 115 MMHG | WEIGHT: 221.3 LBS | BODY MASS INDEX: 30.98 KG/M2 | DIASTOLIC BLOOD PRESSURE: 63 MMHG | RESPIRATION RATE: 18 BRPM | HEIGHT: 71 IN | HEART RATE: 64 BPM

## 2017-02-09 VITALS
WEIGHT: 220 LBS | DIASTOLIC BLOOD PRESSURE: 83 MMHG | BODY MASS INDEX: 31.5 KG/M2 | RESPIRATION RATE: 16 BRPM | OXYGEN SATURATION: 92 % | HEART RATE: 70 BPM | HEIGHT: 70 IN | TEMPERATURE: 96.3 F | SYSTOLIC BLOOD PRESSURE: 150 MMHG

## 2017-02-09 DIAGNOSIS — D69.6 THROMBOCYTOPENIA (HCC): ICD-10-CM

## 2017-02-09 DIAGNOSIS — C83.18 MANTLE CELL LYMPHOMA OF LYMPH NODES OF MULTIPLE REGIONS (HCC): Primary | ICD-10-CM

## 2017-02-09 DIAGNOSIS — T45.1X5A ANTINEOPLASTIC CHEMOTHERAPY INDUCED ANEMIA: ICD-10-CM

## 2017-02-09 DIAGNOSIS — D64.81 ANTINEOPLASTIC CHEMOTHERAPY INDUCED ANEMIA: ICD-10-CM

## 2017-02-09 DIAGNOSIS — I26.99 OTHER ACUTE PULMONARY EMBOLISM WITHOUT ACUTE COR PULMONALE (HCC): ICD-10-CM

## 2017-02-09 DIAGNOSIS — I82.403: ICD-10-CM

## 2017-02-09 DIAGNOSIS — R16.1 SPLENOMEGALY: ICD-10-CM

## 2017-02-09 LAB
ALBUMIN SERPL BCP-MCNC: 2.8 G/DL (ref 3.5–5)
ALBUMIN/GLOB SERPL: 0.9 {RATIO} (ref 1.1–2.2)
ALP SERPL-CCNC: 75 U/L (ref 45–117)
ALT SERPL-CCNC: 18 U/L (ref 12–78)
ANION GAP BLD CALC-SCNC: 4 MMOL/L (ref 5–15)
AST SERPL W P-5'-P-CCNC: 16 U/L (ref 15–37)
BASOPHILS # BLD AUTO: 0 K/UL (ref 0–0.1)
BASOPHILS # BLD: 1 % (ref 0–1)
BILIRUB DIRECT SERPL-MCNC: 0.2 MG/DL (ref 0–0.2)
BILIRUB SERPL-MCNC: 0.5 MG/DL (ref 0.2–1)
BUN SERPL-MCNC: 18 MG/DL (ref 6–20)
BUN/CREAT SERPL: 23 (ref 12–20)
CALCIUM SERPL-MCNC: 7.9 MG/DL (ref 8.5–10.1)
CHLORIDE SERPL-SCNC: 104 MMOL/L (ref 97–108)
CO2 SERPL-SCNC: 31 MMOL/L (ref 21–32)
CREAT SERPL-MCNC: 0.8 MG/DL (ref 0.7–1.3)
DIFFERENTIAL METHOD BLD: ABNORMAL
EOSINOPHIL # BLD: 0.2 K/UL (ref 0–0.4)
EOSINOPHIL NFR BLD: 6 % (ref 0–7)
ERYTHROCYTE [DISTWIDTH] IN BLOOD BY AUTOMATED COUNT: 17.1 % (ref 11.5–14.5)
GLOBULIN SER CALC-MCNC: 3.2 G/DL (ref 2–4)
GLUCOSE SERPL-MCNC: 90 MG/DL (ref 65–100)
HCT VFR BLD AUTO: 33.7 % (ref 36.6–50.3)
HGB BLD-MCNC: 10.6 G/DL (ref 12.1–17)
LYMPHOCYTES # BLD AUTO: 9 % (ref 12–49)
LYMPHOCYTES # BLD: 0.4 K/UL (ref 0.8–3.5)
MCH RBC QN AUTO: 27.2 PG (ref 26–34)
MCHC RBC AUTO-ENTMCNC: 31.5 G/DL (ref 30–36.5)
MCV RBC AUTO: 86.6 FL (ref 80–99)
MONOCYTES # BLD: 0.8 K/UL (ref 0–1)
MONOCYTES NFR BLD AUTO: 19 % (ref 5–13)
NEUTS SEG # BLD: 2.6 K/UL (ref 1.8–8)
NEUTS SEG NFR BLD AUTO: 65 % (ref 32–75)
PLATELET # BLD AUTO: 162 K/UL (ref 150–400)
POTASSIUM SERPL-SCNC: 4 MMOL/L (ref 3.5–5.1)
PROT SERPL-MCNC: 6 G/DL (ref 6.4–8.2)
RBC # BLD AUTO: 3.89 M/UL (ref 4.1–5.7)
RBC MORPH BLD: ABNORMAL
SODIUM SERPL-SCNC: 139 MMOL/L (ref 136–145)
WBC # BLD AUTO: 4 K/UL (ref 4.1–11.1)

## 2017-02-09 PROCEDURE — 96409 CHEMO IV PUSH SNGL DRUG: CPT

## 2017-02-09 PROCEDURE — 74011250636 HC RX REV CODE- 250/636

## 2017-02-09 PROCEDURE — 74011250636 HC RX REV CODE- 250/636: Performed by: INTERNAL MEDICINE

## 2017-02-09 PROCEDURE — 96411 CHEMO IV PUSH ADDL DRUG: CPT

## 2017-02-09 PROCEDURE — 36415 COLL VENOUS BLD VENIPUNCTURE: CPT | Performed by: INTERNAL MEDICINE

## 2017-02-09 PROCEDURE — 77030012965 HC NDL HUBR BBMI -A

## 2017-02-09 PROCEDURE — 74011000258 HC RX REV CODE- 258: Performed by: INTERNAL MEDICINE

## 2017-02-09 PROCEDURE — 85025 COMPLETE CBC W/AUTO DIFF WBC: CPT | Performed by: INTERNAL MEDICINE

## 2017-02-09 PROCEDURE — 96415 CHEMO IV INFUSION ADDL HR: CPT

## 2017-02-09 PROCEDURE — 74011250637 HC RX REV CODE- 250/637: Performed by: INTERNAL MEDICINE

## 2017-02-09 PROCEDURE — 96413 CHEMO IV INFUSION 1 HR: CPT

## 2017-02-09 PROCEDURE — 96375 TX/PRO/DX INJ NEW DRUG ADDON: CPT

## 2017-02-09 PROCEDURE — 80076 HEPATIC FUNCTION PANEL: CPT | Performed by: INTERNAL MEDICINE

## 2017-02-09 PROCEDURE — 80048 BASIC METABOLIC PNL TOTAL CA: CPT | Performed by: INTERNAL MEDICINE

## 2017-02-09 PROCEDURE — 74011000250 HC RX REV CODE- 250

## 2017-02-09 RX ORDER — SODIUM CHLORIDE 0.9 % (FLUSH) 0.9 %
10-40 SYRINGE (ML) INJECTION AS NEEDED
Status: ACTIVE | OUTPATIENT
Start: 2017-02-09 | End: 2017-02-10

## 2017-02-09 RX ORDER — HEPARIN 100 UNIT/ML
500 SYRINGE INTRAVENOUS AS NEEDED
Status: ACTIVE | OUTPATIENT
Start: 2017-02-09 | End: 2017-02-10

## 2017-02-09 RX ORDER — SODIUM CHLORIDE 9 MG/ML
10 INJECTION INTRAMUSCULAR; INTRAVENOUS; SUBCUTANEOUS AS NEEDED
Status: DISPENSED | OUTPATIENT
Start: 2017-02-09 | End: 2017-02-10

## 2017-02-09 RX ADMIN — HEPARIN 500 UNITS: 100 SYRINGE at 15:41

## 2017-02-09 RX ADMIN — PALONOSETRON HYDROCHLORIDE 0.25 MG: 0.25 INJECTION INTRAVENOUS at 12:32

## 2017-02-09 RX ADMIN — SODIUM CHLORIDE 10 ML: 9 INJECTION, SOLUTION INTRAMUSCULAR; INTRAVENOUS; SUBCUTANEOUS at 11:12

## 2017-02-09 RX ADMIN — BENDAMUSTINE HYDROCHLORIDE 210 MG: 25 INJECTION, SOLUTION INTRAVENOUS at 15:05

## 2017-02-09 RX ADMIN — SODIUM CHLORIDE 100 ML/HR: 900 INJECTION, SOLUTION INTRAVENOUS at 12:23

## 2017-02-09 RX ADMIN — DIPHENHYDRAMINE HYDROCHLORIDE 50 MG: 50 INJECTION INTRAMUSCULAR; INTRAVENOUS at 12:26

## 2017-02-09 RX ADMIN — Medication 10 ML: at 15:41

## 2017-02-09 RX ADMIN — ACETAMINOPHEN 650 MG: 325 TABLET ORAL at 12:25

## 2017-02-09 RX ADMIN — Medication 20 ML: at 11:14

## 2017-02-09 RX ADMIN — DEXAMETHASONE SODIUM PHOSPHATE 12 MG: 4 INJECTION, SOLUTION INTRA-ARTICULAR; INTRALESIONAL; INTRAMUSCULAR; INTRAVENOUS; SOFT TISSUE at 12:33

## 2017-02-09 RX ADMIN — RITUXIMAB 875 MG: 10 INJECTION, SOLUTION INTRAVENOUS at 13:20

## 2017-02-09 NOTE — PROGRESS NOTES
[id number] [facility] Oncology at [facility]  [phone]    Hematology / Oncology Follow Up    Reason for Visit:   Prince Murillo is a 66 y.o. male who is seen for follow up of lymphoma. Treatment History:   · CTA Chest 11/11/2016: PE in left lower lobe pulmonary arteries, extensive adneopathy  · CT A/P 11/12/2016: Cirrhosis, massive splenomegaly, ascites, massive lymphadenopathy  · US guided axillary node biopsy 11/14/2016: CD5+ B-cell non-hodgkin lymphoma, phenotype most consistent with mantle cell lymphoma. FISH with t(11;14)  · PET-CT 11/23/2016: Marked splenomegaly with increased metabolic activity consistent with lymphoma. Bilateral cervical and axillary adenopathy. Mediastinal and right hilar and left diaphragmatic adenopathy. Bilateral pelvic and inguinal adenopathy. Multiple small mesenteric and retroperitoneal nodes with low grade or no activity. · BMBx 11/29/2016: Hypercellular marrow involved by mantle cell lymphoma  · Stage III Mantle Cell Lymphoma  · Palliative chemotherapy with Rituximab and Bendamustine beginning 12/8/2016    History of Present Illness:   He reports a new mild cough that started today, minimally productive, improved over the course of the day. No fevers. Mild BOND, no worse. Leg swelling improved. Mild fatigue. PAST HISTORY: The following sections were reviewed and updated in the EMR as appropriate: PMH, SH, FH, Medications, Allergies. No Known Allergies   Review of Systems: A complete review of systems was obtained, reviewed, and scanned into the EMR. Pertinent findings reviewed above.       Physical Exam:     Visit Vitals    /83 (BP 1 Location: Right arm, BP Patient Position: Sitting)    Pulse 70    Temp 96.3 °F (35.7 °C) (Temporal)    Resp 16    Ht 5' 10\" (1.778 m)    Wt 220 lb (99.8 kg)    SpO2 92%    BMI 31.57 kg/m2     ECOG PS: 1  General: No distress  Eyes: PERRLA, anicteric sclerae  HENT: Atraumatic, OP clear  Neck: Supple  Lymphatic: No cervical, supraclavicular, or inguinal adenopathy  Respiratory: CTAB, normal respiratory effort  CV: Normal rate, regular rhythm, no murmurs. Bilateral LE edema, 1+  GI: Soft, nontender, nondistended, no masses, no hepatomegaly, no splenomegaly  MS: Normal gait and station. Digits without clubbing or cyanosis. Skin: No rashes, ecchymoses, or petechiae. Normal temperature, turgor, and texture. Psych: Alert, oriented, appropriate affect, normal judgment/insight    Results:     Lab Results   Component Value Date/Time    WBC 4.0 02/09/2017 11:27 AM    HGB 10.6 02/09/2017 11:27 AM    HCT 33.7 02/09/2017 11:27 AM    PLATELET 333 86/99/6997 11:27 AM    MCV 86.6 02/09/2017 11:27 AM    ABS. NEUTROPHILS 2.6 02/09/2017 11:27 AM    Hemoglobin (POC) 10.9 01/12/2017 08:02 AM    Hematocrit (POC) 32 01/12/2017 08:02 AM     Lab Results   Component Value Date/Time    Sodium 139 02/09/2017 11:14 AM    Potassium 4.0 02/09/2017 11:14 AM    Chloride 104 02/09/2017 11:14 AM    CO2 31 02/09/2017 11:14 AM    Glucose 90 02/09/2017 11:14 AM    BUN 18 02/09/2017 11:14 AM    Creatinine 0.80 02/09/2017 11:14 AM    GFR est AA >60 02/09/2017 11:14 AM    GFR est non-AA >60 02/09/2017 11:14 AM    Calcium 7.9 02/09/2017 11:14 AM    Sodium (POC) 143 01/12/2017 08:02 AM    Potassium (POC) 4.0 01/12/2017 08:02 AM    Chloride (POC) 104 01/12/2017 08:02 AM    Glucose (POC) 156 01/12/2017 08:02 AM    BUN (POC) 23 01/12/2017 08:02 AM    Creatinine (POC) 0.8 01/12/2017 08:02 AM    Calcium, ionized (POC) 1.19 01/12/2017 08:02 AM     Lab Results   Component Value Date/Time    Bilirubin, total 0.5 02/09/2017 11:14 AM    ALT (SGPT) 18 02/09/2017 11:14 AM    AST (SGOT) 16 02/09/2017 11:14 AM    Alk.  phosphatase 75 02/09/2017 11:14 AM    Protein, total 6.0 02/09/2017 11:14 AM    Albumin 2.8 02/09/2017 11:14 AM    Globulin 3.2 02/09/2017 11:14 AM     Lab Results   Component Value Date/Time    Reticulocyte count 1.8 11/10/2016 02:00 PM Iron % saturation 11 11/10/2016 02:00 PM    Iron 26 11/10/2016 02:00 PM    TIBC 240 11/10/2016 02:00 PM    Ferritin 100 11/10/2016 02:00 PM    Vitamin B12 415 11/10/2016 02:00 PM    Folate 15.6 11/10/2016 02:00 PM    Haptoglobin 186 11/11/2016 12:04 PM     11/10/2016 02:00 PM    Beta-2 Microglobulin, serum 4.4 11/15/2016 05:21 AM    TSH 4.06 11/10/2016 11:21 AM    Hep C  virus Ab Interp. NONREACTIVE 08/02/2016 08:54 AM     Lab Results   Component Value Date/Time    INR 1.1 11/11/2016 12:04 PM    aPTT 31.3 11/11/2016 12:04 PM    Fibrinogen 215 11/11/2016 12:04 PM       HepB/C negative  TTE 11/10/2016: EF 60%    Bone marrow biopsy 11/29/2016: Hypercellular marrow involved by mantle cell lymphoma    Assessment:   1) Mantle cell lymphoma  Stage IV  His cancer is not curable and management is with palliative intent. Bone marrow results are positive for marrow involvement, increasing this to stage IV. His MIPI score is 6.24, which is High Risk and cooresponds to a median survival of 37 months and 5-year OS of 20%. He is currently on chemotherapy with R-Bendamustine with plans for 6 cycles, potentially followed by maintenance rituximab. He is tolerating therapy well. We will proceed with treatment today. 2) Depression  Stable. He self-discontinued his lexapro as he has been feeling better. 3) Cirrhosis  Newly diagnosed. Unclear if this is related to the lymphoma. GI following. 4) DVT, PE  Diagnosed 11/11/2016. Malignancy associated. Continue lovenox bid. 5) Anemia  Secondary to #1, and perhaps iron deficiency. He stopped the iron supplements due to nausea. Monitor. 6) Thrombocytopenia  Secondary to splenomegaly. Monitor and treat lymphoma.      Plan:     · Proceed today with C#3 R-Bendamustine  · Labs prior to each treatment: CBC with diff, CMP on day 1  · Antiemetic prophylaxis: Ondansetron and Dexamethasone on day 1 and 2  · Infusion reaction prophylaxis: acetaminophen and diphenhydramine prior to Rituximab  · PRN antiemetics at home: Ondansetron, Prochlorparazine  · Neulasta 24-72 hours after each cycle  · Continue lovenox 1mg/kig BID  · Return to clinic in 4 weeks with next cycle of therapy      Signed By: Kings Betts MD     February 9, 2017

## 2017-02-09 NOTE — PROGRESS NOTES
Oncology Navigator  Psychosocial Assessment    Reason for Assessment:    []Depression  []Anxiety  []Caregiver Viola  []Maladaptive Coping with Serious Illness   [x]Other: ongoing support    Sources of Information:    [x]Patient  []Family  [x]Staff  [x]Medical Record    Advance Care Planning: Requested pt provide a copy of his current AMD  Advance Care Planning 11/10/2016   Patient's Healthcare Decision Maker is: Legal Next of Kin   Primary Decision Maker Name Brodie Cadet   Primary Decision Maker Phone Number [phone]   Primary Decision Maker Relationship to Patient Adult child   Confirm Advance Directive Yes, on file       Mental Status:    [x]Alert  []Lethargic  []Unresponsive  Oriented to:  [x]Person  [x]Place  [x]Time  [x]Situation      Barriers to Learning:    []Language  []Developmental  []Cognitive  []Altered Mental Status  []Visual/Hearing Impairment  []Unable to Read/Write  []Motivational   [x]No Barriers Identified  []Other:    Relationship Status:  []Single  []  []Significant Other/Life Partner  []  []  [x]      Living Circumstances:  [x]Lives Alone  []Family/Significant Other in Household  []Roommates  []Children in the Home  []Paid Caregivers  []Assisted Living Facility/Group Home  []Skilled [address]  []Homeless  []Incarcerated  []Environmental/Care Concerns  []Other:    Support System:    []Strong  [x]Fair  []Limited    Financial/Legal Concerns:    []Uninsured  []Limited Income/Resources  []Non-Citizen  [x]No Concerns Identified  []Financial POA:    []Other:    Jewish/Spiritual/Existential:  []Strong Sense of Spirituality  [x]Involved in [employer]  []Request  Visit  []Expressing Delbert Meckel  [x]No Concerns Identified    Coping with Illness:         Patient: Family/Caregiver:   Understanding and Acceptance of Illness/Prognosis  [x] [x]   Strong Sense of Resilience [] []   Self Reflection [] []   Engaged Support System [x] [x]   Does not Readily Discuss Illness [] []   Denial of Terminal Status [] []   Anger [] []   Depression [] []   Anxiety/Fear [] []   Bargaining [] []   Recent Diagnosis/Prognosis [x] []   Difficulties with Body Image [] []   Loss of Identity [] []   Excessive Substance Use [] []   Mental Health History [] []   Enmeshed Relationships [] []   History of Loss [] []   Anticipatory Grief [x] [x]   Concern for Complicated Grief [] [x]   Suicidal Ideation or Plan [] []   Unable to assess [] []                    Narrative: Pt here for follow up and treatment. Met with pt to provide ongoing support. Pt shared that he is actively coping with diagnosis and treatment needs. Pt's daughter is with him today and provides the practical support pt needs. Talked with pt about additional emotional support for him (i.e. Counseling). Pt declines at this time stating he is feeling better since his initial diagnosis. Pt shared that he continues to grieve the loss of his wife but with each day he feels stronger emotionally. Offered active listening and support. Encouraged pt to let me know if ever a time that he would like any additional bereavement support, counseling info, etc. Pt voiced understanding and thanked me for the visit. Barriers to care were reviewed and none were identified. Continue to follow and provide support as needed. Referrals:     I.   Transportation    Medicaid ([employer]) []   ACS Road to Recovery [x]                                    Regional organization ([employer]) [x]                                      Financial Assistance/Medication Access    Patient assistance program  []   Co-pay assistance  []                                    Leukemia & Lymphoma Society []   [employer] [x]   CancerCare  []     Emotional support    Peer support group [x]   Local counseling [x]                                    Online support group []   Coordination of psychiatry consult [] Goals/Plan:   1.  Ongoing support

## 2017-02-09 NOTE — PROGRESS NOTES
Outpatient Infusion Center Nursing Progress Note    1100  Patient arrived to [facility] accompanied by family for scheduled Rituxan/Bendeka  Cycle 3 Day 1. PT complaint of morning cough. R port  Accessed with  0.75 in. guallpa needle, labs drawn, port flushed, positive blood return noted. 1115  PT sent to MD .    1215 PT returned from MD clinic, no changes in current orders and labs met parameters for treatment. labs   Recent Results (from the past 12 hour(s))   METABOLIC PANEL, BASIC    Collection Time: 02/09/17 11:14 AM   Result Value Ref Range    Sodium 139 136 - 145 mmol/L    Potassium 4.0 3.5 - 5.1 mmol/L    Chloride 104 97 - 108 mmol/L    CO2 31 21 - 32 mmol/L    Anion gap 4 (L) 5 - 15 mmol/L    Glucose 90 65 - 100 mg/dL    BUN 18 6 - 20 MG/DL    Creatinine 0.80 0.70 - 1.30 MG/DL    BUN/Creatinine ratio 23 (H) 12 - 20      GFR est AA >60 >60 ml/min/1.73m2    GFR est non-AA >60 >60 ml/min/1.73m2    Calcium 7.9 (L) 8.5 - 10.1 MG/DL   HEPATIC FUNCTION PANEL    Collection Time: 02/09/17 11:14 AM   Result Value Ref Range    Protein, total 6.0 (L) 6.4 - 8.2 g/dL    Albumin 2.8 (L) 3.5 - 5.0 g/dL    Globulin 3.2 2.0 - 4.0 g/dL    A-G Ratio 0.9 (L) 1.1 - 2.2      Bilirubin, total 0.5 0.2 - 1.0 MG/DL    Bilirubin, direct 0.2 0.0 - 0.2 MG/DL    Alk. phosphatase 75 45 - 117 U/L    AST (SGOT) 16 15 - 37 U/L    ALT (SGPT) 18 12 - 78 U/L   CBC WITH AUTOMATED DIFF    Collection Time: 02/09/17 11:27 AM   Result Value Ref Range    WBC 4.0 (L) 4.1 - 11.1 K/uL    RBC 3.89 (L) 4.10 - 5.70 M/uL    HGB 10.6 (L) 12.1 - 17.0 g/dL    HCT 33.7 (L) 36.6 - 50.3 %    MCV 86.6 80.0 - 99.0 FL    MCH 27.2 26.0 - 34.0 PG    MCHC 31.5 30.0 - 36.5 g/dL    RDW 17.1 (H) 11.5 - 14.5 %    PLATELET 205 621 - 566 K/uL    NEUTROPHILS 65 32 - 75 %    LYMPHOCYTES 9 (L) 12 - 49 %    MONOCYTES 19 (H) 5 - 13 %    EOSINOPHILS 6 0 - 7 %    BASOPHILS 1 0 - 1 %    ABS. NEUTROPHILS 2.6 1.8 - 8.0 K/UL    ABS.  LYMPHOCYTES 0.4 (L) 0.8 - 3.5 K/UL    ABS. MONOCYTES 0.8 0.0 - 1.0 K/UL    ABS. EOSINOPHILS 0.2 0.0 - 0.4 K/UL    ABS. BASOPHILS 0.0 0.0 - 0.1 K/UL    DF SMEAR SCANNED      RBC COMMENTS OVALOCYTES  PRESENT                 Patient received infusion without difficulty. Medications this visit:    Tylenol PO  Benadryl IVP  Aloxi IVP  Decadron IVP  Rituxan IVPB  Bendeka IVPB    Vitals   Patient Vitals for the past 12 hrs:   Temp Pulse Resp BP SpO2   02/09/17 1544 97 °F (36.1 °C) 64 18 115/63 -   02/09/17 1420 97.3 °F (36.3 °C) 63 16 129/64 -   02/09/17 1350 97 °F (36.1 °C) 68 18 153/81 -   02/09/17 1101 97.7 °F (36.5 °C) 66 20 155/84 94 %         PT D/C from [facility]  ambulatory in no distress, accompanied by family.

## 2017-02-09 NOTE — PROGRESS NOTES
Problem: Chemotherapy Treatment  Goal: *Chemotherapy regimen followed  Outcome: Progressing Towards Goal  PT arrived to [facility] ambulatory and in no distress for Bendeka/Rituxan C3D1, PT tolerated TX well.

## 2017-02-10 ENCOUNTER — HOSPITAL ENCOUNTER (OUTPATIENT)
Dept: INFUSION THERAPY | Age: 72
Discharge: HOME | End: 2017-02-10
Payer: MEDICARE

## 2017-02-10 VITALS
HEART RATE: 72 BPM | WEIGHT: 225 LBS | TEMPERATURE: 97.2 F | DIASTOLIC BLOOD PRESSURE: 69 MMHG | SYSTOLIC BLOOD PRESSURE: 120 MMHG | HEIGHT: 71 IN | BODY MASS INDEX: 31.5 KG/M2 | RESPIRATION RATE: 18 BRPM

## 2017-02-10 PROCEDURE — 74011000258 HC RX REV CODE- 258: Performed by: INTERNAL MEDICINE

## 2017-02-10 PROCEDURE — 74011250636 HC RX REV CODE- 250/636

## 2017-02-10 PROCEDURE — 74011250636 HC RX REV CODE- 250/636: Performed by: INTERNAL MEDICINE

## 2017-02-10 PROCEDURE — 96409 CHEMO IV PUSH SNGL DRUG: CPT

## 2017-02-10 PROCEDURE — 96375 TX/PRO/DX INJ NEW DRUG ADDON: CPT

## 2017-02-10 RX ORDER — SODIUM CHLORIDE 9 MG/ML
10 INJECTION INTRAMUSCULAR; INTRAVENOUS; SUBCUTANEOUS AS NEEDED
Status: ACTIVE | OUTPATIENT
Start: 2017-02-10 | End: 2017-02-11

## 2017-02-10 RX ORDER — SODIUM CHLORIDE 0.9 % (FLUSH) 0.9 %
10-40 SYRINGE (ML) INJECTION AS NEEDED
Status: ACTIVE | OUTPATIENT
Start: 2017-02-10 | End: 2017-02-11

## 2017-02-10 RX ORDER — HEPARIN 100 UNIT/ML
500 SYRINGE INTRAVENOUS AS NEEDED
Status: ACTIVE | OUTPATIENT
Start: 2017-02-10 | End: 2017-02-11

## 2017-02-10 RX ADMIN — Medication 10 ML: at 12:19

## 2017-02-10 RX ADMIN — SODIUM CHLORIDE 100 ML/HR: 900 INJECTION, SOLUTION INTRAVENOUS at 11:11

## 2017-02-10 RX ADMIN — BENDAMUSTINE HYDROCHLORIDE 210 MG: 25 INJECTION, SOLUTION INTRAVENOUS at 12:00

## 2017-02-10 RX ADMIN — HEPARIN 500 UNITS: 100 SYRINGE at 12:19

## 2017-02-10 RX ADMIN — DEXAMETHASONE SODIUM PHOSPHATE 12 MG: 4 INJECTION, SOLUTION INTRA-ARTICULAR; INTRALESIONAL; INTRAMUSCULAR; INTRAVENOUS; SOFT TISSUE at 11:11

## 2017-02-10 RX ADMIN — Medication 10 ML: at 11:10

## 2017-02-10 NOTE — PROGRESS NOTES
Outpatient Infusion Center Nursing Progress Note    1055  Patient arrived to [facility] ambulatory accompanied by self for scheduled Rituxan/Bendeka  Cycle 3 Day 2. PT denies new complaints. R port remains accessed with  0.75 in. guallpa needle, port flushed, positive blood return noted. Patient received infusion without difficulty. Medications this visit:    NS IV  Decadron IVP  Bendeka IVPB    Vitals   Patient Vitals for the past 12 hrs:   Temp Pulse Resp BP   02/10/17 1223 97.2 °F (36.2 °C) 72 18 120/69   02/10/17 1057 97 °F (36.1 °C) 82 18 144/74         Next appointment,  3/9/17 @ 1100    1225 D/C from [facility] ambulatory in no distress, accompanied by self.

## 2017-02-10 NOTE — PROGRESS NOTES
Problem: Chemotherapy Treatment  Goal: *Chemotherapy regimen followed  Outcome: Progressing Towards Goal  PT arrived at [facility] ambulatory and in no distress for Rituxan/Bendeka  C3D2, PT tolerated TX well.

## 2017-03-02 ENCOUNTER — APPOINTMENT (OUTPATIENT)
Dept: INFUSION THERAPY | Age: 72
End: 2017-03-02
Payer: MEDICARE

## 2017-03-03 ENCOUNTER — APPOINTMENT (OUTPATIENT)
Dept: INFUSION THERAPY | Age: 72
End: 2017-03-03
Payer: MEDICARE

## 2017-03-06 RX ORDER — SODIUM CHLORIDE 9 MG/ML
100 INJECTION, SOLUTION INTRAVENOUS CONTINUOUS
Status: DISPENSED | OUTPATIENT
Start: 2017-03-09 | End: 2017-03-09

## 2017-03-06 RX ORDER — DIPHENHYDRAMINE HYDROCHLORIDE 50 MG/ML
50 INJECTION, SOLUTION INTRAMUSCULAR; INTRAVENOUS
Status: ACTIVE | OUTPATIENT
Start: 2017-03-09 | End: 2017-03-09

## 2017-03-06 RX ORDER — DEXAMETHASONE SODIUM PHOSPHATE 4 MG/ML
12 INJECTION, SOLUTION INTRA-ARTICULAR; INTRALESIONAL; INTRAMUSCULAR; INTRAVENOUS; SOFT TISSUE ONCE
Status: COMPLETED | OUTPATIENT
Start: 2017-03-10 | End: 2017-03-10

## 2017-03-06 RX ORDER — PALONOSETRON 0.05 MG/ML
0.25 INJECTION, SOLUTION INTRAVENOUS ONCE
Status: COMPLETED | OUTPATIENT
Start: 2017-03-09 | End: 2017-03-09

## 2017-03-06 RX ORDER — SODIUM CHLORIDE 9 MG/ML
100 INJECTION, SOLUTION INTRAVENOUS CONTINUOUS
Status: DISPENSED | OUTPATIENT
Start: 2017-03-10 | End: 2017-03-10

## 2017-03-06 RX ORDER — ACETAMINOPHEN 325 MG/1
650 TABLET ORAL ONCE
Status: COMPLETED | OUTPATIENT
Start: 2017-03-09 | End: 2017-03-09

## 2017-03-06 RX ORDER — ACETAMINOPHEN 325 MG/1
650 TABLET ORAL
Status: ACTIVE | OUTPATIENT
Start: 2017-03-09 | End: 2017-03-09

## 2017-03-06 RX ORDER — DIPHENHYDRAMINE HYDROCHLORIDE 50 MG/ML
50 INJECTION, SOLUTION INTRAMUSCULAR; INTRAVENOUS ONCE
Status: COMPLETED | OUTPATIENT
Start: 2017-03-09 | End: 2017-03-09

## 2017-03-06 RX ORDER — DEXAMETHASONE SODIUM PHOSPHATE 4 MG/ML
12 INJECTION, SOLUTION INTRA-ARTICULAR; INTRALESIONAL; INTRAMUSCULAR; INTRAVENOUS; SOFT TISSUE ONCE
Status: COMPLETED | OUTPATIENT
Start: 2017-03-09 | End: 2017-03-09

## 2017-03-09 ENCOUNTER — OFFICE VISIT (OUTPATIENT)
Dept: ONCOLOGY | Age: 72
End: 2017-03-09

## 2017-03-09 ENCOUNTER — HOSPITAL ENCOUNTER (OUTPATIENT)
Dept: INFUSION THERAPY | Age: 72
Discharge: HOME | End: 2017-03-09
Payer: MEDICARE

## 2017-03-09 VITALS
TEMPERATURE: 96.5 F | RESPIRATION RATE: 20 BRPM | DIASTOLIC BLOOD PRESSURE: 82 MMHG | SYSTOLIC BLOOD PRESSURE: 132 MMHG | BODY MASS INDEX: 30.24 KG/M2 | HEIGHT: 71 IN | WEIGHT: 216 LBS | OXYGEN SATURATION: 93 % | HEART RATE: 67 BPM

## 2017-03-09 VITALS
TEMPERATURE: 97 F | OXYGEN SATURATION: 93 % | HEIGHT: 71 IN | HEART RATE: 67 BPM | SYSTOLIC BLOOD PRESSURE: 105 MMHG | DIASTOLIC BLOOD PRESSURE: 66 MMHG | WEIGHT: 216 LBS | RESPIRATION RATE: 18 BRPM | BODY MASS INDEX: 30.24 KG/M2

## 2017-03-09 DIAGNOSIS — R06.00 DYSPNEA, UNSPECIFIED TYPE: ICD-10-CM

## 2017-03-09 DIAGNOSIS — I82.403: ICD-10-CM

## 2017-03-09 DIAGNOSIS — C83.18 MANTLE CELL LYMPHOMA OF LYMPH NODES OF MULTIPLE REGIONS (HCC): Primary | ICD-10-CM

## 2017-03-09 DIAGNOSIS — D69.6 THROMBOCYTOPENIA (HCC): ICD-10-CM

## 2017-03-09 DIAGNOSIS — R60.0 BILATERAL EDEMA OF LOWER EXTREMITY: ICD-10-CM

## 2017-03-09 LAB
ALBUMIN SERPL BCP-MCNC: 2.9 G/DL (ref 3.5–5)
ALBUMIN/GLOB SERPL: 0.8 {RATIO} (ref 1.1–2.2)
ALP SERPL-CCNC: 88 U/L (ref 45–117)
ALT SERPL-CCNC: 20 U/L (ref 12–78)
ANION GAP BLD CALC-SCNC: 4 MMOL/L (ref 5–15)
AST SERPL W P-5'-P-CCNC: 17 U/L (ref 15–37)
BASOPHILS # BLD AUTO: 0 K/UL (ref 0–0.1)
BASOPHILS # BLD: 1 % (ref 0–1)
BILIRUB DIRECT SERPL-MCNC: <0.1 MG/DL (ref 0–0.2)
BILIRUB SERPL-MCNC: 0.4 MG/DL (ref 0.2–1)
BUN SERPL-MCNC: 24 MG/DL (ref 6–20)
BUN/CREAT SERPL: 27 (ref 12–20)
CALCIUM SERPL-MCNC: 8.5 MG/DL (ref 8.5–10.1)
CHLORIDE SERPL-SCNC: 103 MMOL/L (ref 97–108)
CO2 SERPL-SCNC: 32 MMOL/L (ref 21–32)
CREAT SERPL-MCNC: 0.89 MG/DL (ref 0.7–1.3)
DIFFERENTIAL METHOD BLD: ABNORMAL
EOSINOPHIL # BLD: 0.2 K/UL (ref 0–0.4)
EOSINOPHIL NFR BLD: 7 % (ref 0–7)
ERYTHROCYTE [DISTWIDTH] IN BLOOD BY AUTOMATED COUNT: 17.1 % (ref 11.5–14.5)
GLOBULIN SER CALC-MCNC: 3.5 G/DL (ref 2–4)
GLUCOSE SERPL-MCNC: 86 MG/DL (ref 65–100)
HCT VFR BLD AUTO: 35.3 % (ref 36.6–50.3)
HGB BLD-MCNC: 10.8 G/DL (ref 12.1–17)
LYMPHOCYTES # BLD AUTO: 24 % (ref 12–49)
LYMPHOCYTES # BLD: 0.6 K/UL (ref 0.8–3.5)
MCH RBC QN AUTO: 26.8 PG (ref 26–34)
MCHC RBC AUTO-ENTMCNC: 30.6 G/DL (ref 30–36.5)
MCV RBC AUTO: 87.6 FL (ref 80–99)
MONOCYTES # BLD: 0.3 K/UL (ref 0–1)
MONOCYTES NFR BLD AUTO: 10 % (ref 5–13)
NEUTS SEG # BLD: 1.6 K/UL (ref 1.8–8)
NEUTS SEG NFR BLD AUTO: 58 % (ref 32–75)
PLATELET # BLD AUTO: 120 K/UL (ref 150–400)
POTASSIUM SERPL-SCNC: 4.7 MMOL/L (ref 3.5–5.1)
PROT SERPL-MCNC: 6.4 G/DL (ref 6.4–8.2)
RBC # BLD AUTO: 4.03 M/UL (ref 4.1–5.7)
RBC MORPH BLD: ABNORMAL
SODIUM SERPL-SCNC: 139 MMOL/L (ref 136–145)
WBC # BLD AUTO: 2.7 K/UL (ref 4.1–11.1)

## 2017-03-09 PROCEDURE — 96411 CHEMO IV PUSH ADDL DRUG: CPT

## 2017-03-09 PROCEDURE — 74011000258 HC RX REV CODE- 258: Performed by: INTERNAL MEDICINE

## 2017-03-09 PROCEDURE — 74011250636 HC RX REV CODE- 250/636: Performed by: INTERNAL MEDICINE

## 2017-03-09 PROCEDURE — 74011000250 HC RX REV CODE- 250: Performed by: INTERNAL MEDICINE

## 2017-03-09 PROCEDURE — 96375 TX/PRO/DX INJ NEW DRUG ADDON: CPT

## 2017-03-09 PROCEDURE — 36415 COLL VENOUS BLD VENIPUNCTURE: CPT | Performed by: INTERNAL MEDICINE

## 2017-03-09 PROCEDURE — 96413 CHEMO IV INFUSION 1 HR: CPT

## 2017-03-09 PROCEDURE — 80076 HEPATIC FUNCTION PANEL: CPT | Performed by: INTERNAL MEDICINE

## 2017-03-09 PROCEDURE — 80048 BASIC METABOLIC PNL TOTAL CA: CPT | Performed by: INTERNAL MEDICINE

## 2017-03-09 PROCEDURE — 74011250637 HC RX REV CODE- 250/637: Performed by: INTERNAL MEDICINE

## 2017-03-09 PROCEDURE — 85025 COMPLETE CBC W/AUTO DIFF WBC: CPT | Performed by: INTERNAL MEDICINE

## 2017-03-09 PROCEDURE — 96415 CHEMO IV INFUSION ADDL HR: CPT

## 2017-03-09 PROCEDURE — 77030012965 HC NDL HUBR BBMI -A

## 2017-03-09 RX ORDER — HEPARIN 100 UNIT/ML
500 SYRINGE INTRAVENOUS AS NEEDED
Status: ACTIVE | OUTPATIENT
Start: 2017-03-09 | End: 2017-03-10

## 2017-03-09 RX ORDER — SODIUM CHLORIDE 0.9 % (FLUSH) 0.9 %
5-10 SYRINGE (ML) INJECTION AS NEEDED
Status: ACTIVE | OUTPATIENT
Start: 2017-03-09 | End: 2017-03-10

## 2017-03-09 RX ORDER — SODIUM CHLORIDE 9 MG/ML
10 INJECTION INTRAMUSCULAR; INTRAVENOUS; SUBCUTANEOUS AS NEEDED
Status: ACTIVE | OUTPATIENT
Start: 2017-03-09 | End: 2017-03-10

## 2017-03-09 RX ADMIN — Medication 10 ML: at 11:23

## 2017-03-09 RX ADMIN — Medication 10 ML: at 14:36

## 2017-03-09 RX ADMIN — DEXAMETHASONE SODIUM PHOSPHATE 12 MG: 4 INJECTION, SOLUTION INTRA-ARTICULAR; INTRALESIONAL; INTRAMUSCULAR; INTRAVENOUS; SOFT TISSUE at 12:17

## 2017-03-09 RX ADMIN — SODIUM CHLORIDE 10 ML: 9 INJECTION, SOLUTION INTRAMUSCULAR; INTRAVENOUS; SUBCUTANEOUS at 11:21

## 2017-03-09 RX ADMIN — BENDAMUSTINE HYDROCHLORIDE 210 MG: 25 INJECTION, SOLUTION INTRAVENOUS at 14:20

## 2017-03-09 RX ADMIN — HEPARIN SODIUM (PORCINE) LOCK FLUSH IV SOLN 100 UNIT/ML 500 UNITS: 100 SOLUTION at 14:36

## 2017-03-09 RX ADMIN — Medication 10 ML: at 12:09

## 2017-03-09 RX ADMIN — ACETAMINOPHEN 650 MG: 325 TABLET ORAL at 12:07

## 2017-03-09 RX ADMIN — SODIUM CHLORIDE 100 ML/HR: 900 INJECTION, SOLUTION INTRAVENOUS at 12:06

## 2017-03-09 RX ADMIN — DIPHENHYDRAMINE HYDROCHLORIDE 50 MG: 50 INJECTION INTRAMUSCULAR; INTRAVENOUS at 12:14

## 2017-03-09 RX ADMIN — RITUXIMAB 875 MG: 10 INJECTION, SOLUTION INTRAVENOUS at 12:32

## 2017-03-09 RX ADMIN — Medication 10 ML: at 11:21

## 2017-03-09 RX ADMIN — PALONOSETRON HYDROCHLORIDE 0.25 MG: 0.25 INJECTION INTRAVENOUS at 12:12

## 2017-03-09 NOTE — PROGRESS NOTES
Problem: Chemotherapy Treatment  Goal: *Chemotherapy regimen followed  Outcome: Progressing Towards Goal  Patient present for chemotherapy and verbalizes understanding.

## 2017-03-09 NOTE — PROGRESS NOTES
[id number] [facility] at [address] [place] [address]  [phone]    Hematology / Oncology Follow Up    Reason for Visit:   Maye Martinez is a 66 y.o. male who is seen for follow up of lymphoma. Treatment History:   · CTA Chest 11/11/2016: PE in left lower lobe pulmonary arteries, extensive adneopathy  · CT A/P 11/12/2016: Cirrhosis, massive splenomegaly, ascites, massive lymphadenopathy  · US guided axillary node biopsy 11/14/2016: CD5+ B-cell non-hodgkin lymphoma, phenotype most consistent with mantle cell lymphoma. FISH with t(11;14)  · PET-CT 11/23/2016: Marked splenomegaly with increased metabolic activity consistent with lymphoma. Bilateral cervical and axillary adenopathy. Mediastinal and right hilar and left diaphragmatic adenopathy. Bilateral pelvic and inguinal adenopathy. Multiple small mesenteric and retroperitoneal nodes with low grade or no activity. · BMBx 11/29/2016: Hypercellular marrow involved by mantle cell lymphoma  · Stage III Mantle Cell Lymphoma  · Palliative chemotherapy with Rituximab and Bendamustine beginning 12/8/2016    History of Present Illness:   Here today for follow up. He reports feeling good. Leg swelling waxes and wanes, mild today. Cough resolved. Mild BOND, stable. Mild fatigue. Compliant with Lovenox. Appetite good. Reports being more active. PAST HISTORY: The following sections were reviewed and updated in the EMR as appropriate: PMH, SH, FH, Medications, Allergies. No Known Allergies   Review of Systems: A complete review of systems was obtained, reviewed, and scanned into the EMR. Pertinent findings reviewed above. Physical Exam:     Visit Vitals    /82 (BP 1 Location: Left arm, BP Patient Position: Sitting)  Comment: .     Pulse 67    Temp 96.5 °F (35.8 °C) (Temporal)    Resp 20    Ht 5' 11\" (1.803 m)    Wt 216 lb (98 kg)    SpO2 93%    BMI 30.13 kg/m2     ECOG PS: 1  General: No distress  Eyes: PERRLA, anicteric sclerae  HENT: Atraumatic, OP clear  Neck: Supple  Lymphatic: No cervical, supraclavicular, or inguinal adenopathy  Respiratory: CTAB, normal respiratory effort  CV: Normal rate, regular rhythm, no murmurs. Bilateral LE edema, 1+  GI: Soft, nontender, nondistended, no masses, no hepatomegaly, no splenomegaly  MS: Normal gait and station. Digits without clubbing or cyanosis. Skin: No rashes, ecchymoses, or petechiae. Normal temperature, turgor, and texture. Psych: Alert, oriented, appropriate affect, normal judgment/insight    Results:     Lab Results   Component Value Date/Time    WBC 2.7 03/09/2017 11:20 AM    HGB 10.8 03/09/2017 11:20 AM    HCT 35.3 03/09/2017 11:20 AM    PLATELET 594 80/22/1026 11:20 AM    MCV 87.6 03/09/2017 11:20 AM    ABS. NEUTROPHILS 1.6 03/09/2017 11:20 AM    Hemoglobin (POC) 10.9 01/12/2017 08:02 AM    Hematocrit (POC) 32 01/12/2017 08:02 AM     Lab Results   Component Value Date/Time    Sodium 139 03/09/2017 11:20 AM    Potassium 4.7 03/09/2017 11:20 AM    Chloride 103 03/09/2017 11:20 AM    CO2 32 03/09/2017 11:20 AM    Glucose 86 03/09/2017 11:20 AM    BUN 24 03/09/2017 11:20 AM    Creatinine 0.89 03/09/2017 11:20 AM    GFR est AA >60 03/09/2017 11:20 AM    GFR est non-AA >60 03/09/2017 11:20 AM    Calcium 8.5 03/09/2017 11:20 AM    Sodium (POC) 143 01/12/2017 08:02 AM    Potassium (POC) 4.0 01/12/2017 08:02 AM    Chloride (POC) 104 01/12/2017 08:02 AM    Glucose (POC) 156 01/12/2017 08:02 AM    BUN (POC) 23 01/12/2017 08:02 AM    Creatinine (POC) 0.8 01/12/2017 08:02 AM    Calcium, ionized (POC) 1.19 01/12/2017 08:02 AM     Lab Results   Component Value Date/Time    Bilirubin, total 0.4 03/09/2017 11:20 AM    ALT (SGPT) 20 03/09/2017 11:20 AM    AST (SGOT) 17 03/09/2017 11:20 AM    Alk.  phosphatase 88 03/09/2017 11:20 AM    Protein, total 6.4 03/09/2017 11:20 AM    Albumin 2.9 03/09/2017 11:20 AM    Globulin 3.5 03/09/2017 11:20 AM     Lab Results   Component Value Date/Time Reticulocyte count 1.8 11/10/2016 02:00 PM    Iron % saturation 11 11/10/2016 02:00 PM    Iron 26 11/10/2016 02:00 PM    TIBC 240 11/10/2016 02:00 PM    Ferritin 100 11/10/2016 02:00 PM    Vitamin B12 415 11/10/2016 02:00 PM    Folate 15.6 11/10/2016 02:00 PM    Haptoglobin 186 11/11/2016 12:04 PM     11/10/2016 02:00 PM    Beta-2 Microglobulin, serum 4.4 11/15/2016 05:21 AM    TSH 4.06 11/10/2016 11:21 AM    Hep C  virus Ab Interp. NONREACTIVE 08/02/2016 08:54 AM     Lab Results   Component Value Date/Time    INR 1.1 11/11/2016 12:04 PM    aPTT 31.3 11/11/2016 12:04 PM    Fibrinogen 215 11/11/2016 12:04 PM       HepB/C negative  TTE 11/10/2016: EF 60%    Bone marrow biopsy 11/29/2016: Hypercellular marrow involved by mantle cell lymphoma    Assessment:   1) Mantle cell lymphoma  Stage IV  His cancer is not curable and management is with palliative intent. Bone marrow results are positive for marrow involvement, increasing this to stage IV. His MIPI score is 6.24, which is High Risk and cooresponds to a median survival of 37 months and 5-year OS of 20%. He is currently on chemotherapy with R-Bendamustine with plans for 6 cycles, potentially followed by maintenance rituximab. He continues tolerating therapy very well. We will proceed with next cycle of therapy today. 2) Depression  Stable. He self-discontinued his lexapro as he has been feeling better. 3) Cirrhosis  Newly diagnosed. Unclear if this is related to the lymphoma. GI following. 4) DVT, PE  Diagnosed 11/11/2016. Malignancy associated. Continue lovenox bid. 5) Anemia  Stable today. Secondary to #1, and perhaps iron deficiency. He stopped the iron supplements due to nausea. Monitor. 6) Thrombocytopenia  Stable today. Secondary to splenomegaly. Monitor and treat lymphoma.      Plan:     · Proceed today with C#4 R-Bendamustine  · Labs prior to each treatment: CBC with diff, CMP on day 1  · Antiemetic prophylaxis: Ondansetron and Dexamethasone on day 1 and 2  · Infusion reaction prophylaxis: acetaminophen and diphenhydramine prior to Rituximab  · PRN antiemetics at home: Ondansetron, Prochlorparazine  · Neulasta 24-72 hours after each cycle  · Continue lovenox 1mg/kig BID  · Return to clinic in 4 weeks with next cycle of therapy      Signed By: Tomas Isaac MD     March 9, 2017

## 2017-03-09 NOTE — PROGRESS NOTES
\[facility]\"" Progress Note    Date: 2017    Name: Jonathan Levin    MRN: [id number]         : 1945    Mr. Hernandez Arrived ambulatory and in no distress for cycle 4 day 1 of Bendamustine plus Rituxan regimen. Assessment was completed, no acute issues at this time, no new complaints voiced. Port accessed without difficulty, labs drawn and in process. Chemotherapy Flowsheet 3/9/2017   Cycle C4D1   Date 3/9/2017   Drug / Regimen Rituxan/Bendamustine         [id number]:  Proceeded to appt with Dr. Abraham Auguste and returns, continuing with treatment. .    Mr. Rob Adrian vitals were reviewed. Visit Vitals    /82    Pulse 68    Temp 97 °F (36.1 °C)    Resp 18    Ht 5' 11\" (1.803 m)    Wt 98 kg (216 lb)    SpO2 96%    BMI 30.13 kg/m2       Lab results were obtained and reviewed. Recent Results (from the past 12 hour(s))   CBC WITH AUTOMATED DIFF    Collection Time: 17 11:20 AM   Result Value Ref Range    WBC 2.7 (L) 4.1 - 11.1 K/uL    RBC 4.03 (L) 4.10 - 5.70 M/uL    HGB 10.8 (L) 12.1 - 17.0 g/dL    HCT 35.3 (L) 36.6 - 50.3 %    MCV 87.6 80.0 - 99.0 FL    MCH 26.8 26.0 - 34.0 PG    MCHC 30.6 30.0 - 36.5 g/dL    RDW 17.1 (H) 11.5 - 14.5 %    PLATELET 672 (L) 500 - 400 K/uL    NEUTROPHILS 58 32 - 75 %    LYMPHOCYTES 24 12 - 49 %    MONOCYTES 10 5 - 13 %    EOSINOPHILS 7 0 - 7 %    BASOPHILS 1 0 - 1 %    ABS. NEUTROPHILS 1.6 (L) 1.8 - 8.0 K/UL    ABS. LYMPHOCYTES 0.6 (L) 0.8 - 3.5 K/UL    ABS. MONOCYTES 0.3 0.0 - 1.0 K/UL    ABS. EOSINOPHILS 0.2 0.0 - 0.4 K/UL    ABS.  BASOPHILS 0.0 0.0 - 0.1 K/UL    DF SMEAR SCANNED      RBC COMMENTS ANISOCYTOSIS  1+        RBC COMMENTS TEARDROP CELLS  PRESENT        RBC COMMENTS OVALOCYTES  PRESENT       METABOLIC PANEL, BASIC    Collection Time: 17 11:20 AM   Result Value Ref Range    Sodium 139 136 - 145 mmol/L    Potassium 4.7 3.5 - 5.1 mmol/L    Chloride 103 97 - 108 mmol/L    CO2 32 21 - 32 mmol/L    Anion gap 4 (L) 5 - 15 mmol/L    Glucose 86 65 - 100 mg/dL BUN 24 (H) 6 - 20 MG/DL    Creatinine 0.89 0.70 - 1.30 MG/DL    BUN/Creatinine ratio 27 (H) 12 - 20      GFR est AA >60 >60 ml/min/1.73m2    GFR est non-AA >60 >60 ml/min/1.73m2    Calcium 8.5 8.5 - 10.1 MG/DL   HEPATIC FUNCTION PANEL    Collection Time: 03/09/17 11:20 AM   Result Value Ref Range    Protein, total 6.4 6.4 - 8.2 g/dL    Albumin 2.9 (L) 3.5 - 5.0 g/dL    Globulin 3.5 2.0 - 4.0 g/dL    A-G Ratio 0.8 (L) 1.1 - 2.2      Bilirubin, total 0.4 0.2 - 1.0 MG/DL    Bilirubin, direct <0.1 0.0 - 0.2 MG/DL    Alk. phosphatase 88 45 - 117 U/L    AST (SGOT) 17 15 - 37 U/L    ALT (SGPT) 20 12 - 78 U/L         Pre-medications  were administered as ordered and chemotherapy was infused without complications. Patient monitored, no adverse reactions noted. Blood return noted pre and post treatment, port flushed per policy and needle left in place for next day's treatment. Medications received:  NS IV  Tylenol po  Benadryl IV  Aloxi IV  Decadron IV  Rituxan IV rapid infusion  Bendamustine IV    Patient Vitals for the past 12 hrs:   Temp Pulse Resp BP SpO2   03/09/17 1435 97 °F (36.1 °C) 67 18 105/66 93 %   03/09/17 1415 97.3 °F (36.3 °C) 65 18 117/77 93 %   03/09/17 1330 97.7 °F (36.5 °C) 63 18 130/80 94 %   03/09/17 1112 97 °F (36.1 °C) 68 18 127/82 96 %     Mr. Hernandez tolerated treatment well and was discharged from [place] [address] in stable condition at [phone]. Discharge instructions given and patient verbalizes understanding. He is to return on March 10 at 1100 for his next appointment.     Poornima Castano RN  March 9, 2017

## 2017-03-10 ENCOUNTER — HOSPITAL ENCOUNTER (OUTPATIENT)
Dept: INFUSION THERAPY | Age: 72
Discharge: HOME | End: 2017-03-10
Payer: MEDICARE

## 2017-03-10 VITALS
SYSTOLIC BLOOD PRESSURE: 120 MMHG | HEIGHT: 71 IN | TEMPERATURE: 97.2 F | OXYGEN SATURATION: 93 % | WEIGHT: 222.6 LBS | DIASTOLIC BLOOD PRESSURE: 70 MMHG | HEART RATE: 72 BPM | RESPIRATION RATE: 16 BRPM | BODY MASS INDEX: 31.16 KG/M2

## 2017-03-10 PROCEDURE — 74011250636 HC RX REV CODE- 250/636

## 2017-03-10 PROCEDURE — 96375 TX/PRO/DX INJ NEW DRUG ADDON: CPT

## 2017-03-10 PROCEDURE — 96409 CHEMO IV PUSH SNGL DRUG: CPT

## 2017-03-10 PROCEDURE — 74011000258 HC RX REV CODE- 258: Performed by: INTERNAL MEDICINE

## 2017-03-10 PROCEDURE — 96361 HYDRATE IV INFUSION ADD-ON: CPT

## 2017-03-10 PROCEDURE — 74011250636 HC RX REV CODE- 250/636: Performed by: INTERNAL MEDICINE

## 2017-03-10 RX ORDER — SODIUM CHLORIDE 9 MG/ML
10 INJECTION INTRAMUSCULAR; INTRAVENOUS; SUBCUTANEOUS AS NEEDED
Status: DISPENSED | OUTPATIENT
Start: 2017-03-10 | End: 2017-03-11

## 2017-03-10 RX ORDER — HEPARIN 100 UNIT/ML
500 SYRINGE INTRAVENOUS AS NEEDED
Status: ACTIVE | OUTPATIENT
Start: 2017-03-10 | End: 2017-03-11

## 2017-03-10 RX ORDER — SODIUM CHLORIDE 0.9 % (FLUSH) 0.9 %
10-40 SYRINGE (ML) INJECTION AS NEEDED
Status: ACTIVE | OUTPATIENT
Start: 2017-03-10 | End: 2017-03-11

## 2017-03-10 RX ORDER — DEXAMETHASONE SODIUM PHOSPHATE 4 MG/ML
4 INJECTION, SOLUTION INTRA-ARTICULAR; INTRALESIONAL; INTRAMUSCULAR; INTRAVENOUS; SOFT TISSUE ONCE
Status: DISPENSED | OUTPATIENT
Start: 2017-03-10 | End: 2017-03-10

## 2017-03-10 RX ADMIN — SODIUM CHLORIDE 100 ML/HR: 900 INJECTION, SOLUTION INTRAVENOUS at 11:16

## 2017-03-10 RX ADMIN — Medication 10 ML: at 11:16

## 2017-03-10 RX ADMIN — HEPARIN 500 UNITS: 100 SYRINGE at 12:50

## 2017-03-10 RX ADMIN — DEXAMETHASONE SODIUM PHOSPHATE 12 MG: 4 INJECTION, SOLUTION INTRA-ARTICULAR; INTRALESIONAL; INTRAMUSCULAR; INTRAVENOUS; SOFT TISSUE at 11:16

## 2017-03-10 RX ADMIN — BENDAMUSTINE HYDROCHLORIDE 210 MG: 25 INJECTION, SOLUTION INTRAVENOUS at 12:35

## 2017-03-10 RX ADMIN — Medication 10 ML: at 12:50

## 2017-03-10 NOTE — PROGRESS NOTES
Outpatient Infusion Center - Chemotherapy Progress Note    1110  Pt admit to [facility] for Cycle 4D2 Bendamustine plus Rituximab ambulatory in stable condition. Assessment completed. No new concerns voiced. Right chest port remains accessed from yesterday's appt, flushed with positive blood return. Labs reviewed from 3/9/17, labs within treatment parameters. Visit Vitals    /70    Pulse 72    Temp 97.2 °F (36.2 °C)    Resp 16    Ht 5' 11\" (1.803 m)    Wt 101 kg (222 lb 9.6 oz)    SpO2 93%    BMI 31.05 kg/m2        Medications:  NS   Decadron IV  Bendamustine IV    Blood pressure 120/70, pulse 72, temperature 97.2 °F (36.2 °C), resp. rate 16, height 5' 11\" (1.803 m), weight 101 kg (222 lb 9.6 oz), SpO2 93 %. 1255  Pt tolerated treatment well. Positive blood return pre and post infusion, port flushed and deaccessed per hyaqu. D/c home ambulatory in no distress.  Pt aware of next [facility] appointment scheduled for 11 am.

## 2017-03-10 NOTE — PROGRESS NOTES
Problem: Chemotherapy Treatment  Goal: *Chemotherapy regimen followed  Outcome: Progressing Towards Goal  Pt received cycle 4 Day 2 Bendamustine plus Rituximab as ordered, tolerated well.

## 2017-04-03 RX ORDER — ACETAMINOPHEN 325 MG/1
650 TABLET ORAL ONCE
Status: DISPENSED | OUTPATIENT
Start: 2017-04-06 | End: 2017-04-06

## 2017-04-03 RX ORDER — PALONOSETRON 0.05 MG/ML
0.25 INJECTION, SOLUTION INTRAVENOUS ONCE
Status: DISPENSED | OUTPATIENT
Start: 2017-04-06 | End: 2017-04-07

## 2017-04-03 RX ORDER — SODIUM CHLORIDE 9 MG/ML
100 INJECTION, SOLUTION INTRAVENOUS CONTINUOUS
Status: DISPENSED | OUTPATIENT
Start: 2017-04-06 | End: 2017-04-06

## 2017-04-03 RX ORDER — DEXAMETHASONE SODIUM PHOSPHATE 4 MG/ML
12 INJECTION, SOLUTION INTRA-ARTICULAR; INTRALESIONAL; INTRAMUSCULAR; INTRAVENOUS; SOFT TISSUE ONCE
Status: DISPENSED | OUTPATIENT
Start: 2017-04-06 | End: 2017-04-07

## 2017-04-03 RX ORDER — SODIUM CHLORIDE 9 MG/ML
100 INJECTION, SOLUTION INTRAVENOUS CONTINUOUS
Status: DISPENSED | OUTPATIENT
Start: 2017-04-07 | End: 2017-04-07

## 2017-04-03 RX ORDER — DIPHENHYDRAMINE HYDROCHLORIDE 50 MG/ML
50 INJECTION, SOLUTION INTRAMUSCULAR; INTRAVENOUS ONCE
Status: DISPENSED | OUTPATIENT
Start: 2017-04-06 | End: 2017-04-06

## 2017-04-03 RX ORDER — DEXAMETHASONE SODIUM PHOSPHATE 4 MG/ML
12 INJECTION, SOLUTION INTRA-ARTICULAR; INTRALESIONAL; INTRAMUSCULAR; INTRAVENOUS; SOFT TISSUE ONCE
Status: ACTIVE | OUTPATIENT
Start: 2017-04-07 | End: 2017-04-07

## 2017-04-03 RX ORDER — ACETAMINOPHEN 325 MG/1
650 TABLET ORAL
Status: ACTIVE | OUTPATIENT
Start: 2017-04-06 | End: 2017-04-06

## 2017-04-03 RX ORDER — DIPHENHYDRAMINE HYDROCHLORIDE 50 MG/ML
50 INJECTION, SOLUTION INTRAMUSCULAR; INTRAVENOUS
Status: ACTIVE | OUTPATIENT
Start: 2017-04-06 | End: 2017-04-06

## 2017-04-06 ENCOUNTER — HOSPITAL ENCOUNTER (OUTPATIENT)
Dept: INFUSION THERAPY | Age: 72
Discharge: HOME | End: 2017-04-06
Payer: MEDICARE

## 2017-04-06 ENCOUNTER — OFFICE VISIT (OUTPATIENT)
Dept: ONCOLOGY | Age: 72
End: 2017-04-06

## 2017-04-06 VITALS
BODY MASS INDEX: 31.58 KG/M2 | SYSTOLIC BLOOD PRESSURE: 145 MMHG | DIASTOLIC BLOOD PRESSURE: 89 MMHG | HEIGHT: 71 IN | TEMPERATURE: 96.3 F | HEART RATE: 67 BPM | WEIGHT: 225.6 LBS | RESPIRATION RATE: 18 BRPM | OXYGEN SATURATION: 97 %

## 2017-04-06 VITALS
HEART RATE: 65 BPM | SYSTOLIC BLOOD PRESSURE: 152 MMHG | WEIGHT: 226.2 LBS | HEIGHT: 71 IN | DIASTOLIC BLOOD PRESSURE: 95 MMHG | RESPIRATION RATE: 18 BRPM | TEMPERATURE: 97 F | BODY MASS INDEX: 31.67 KG/M2

## 2017-04-06 DIAGNOSIS — I82.403: ICD-10-CM

## 2017-04-06 DIAGNOSIS — T45.1X5A CHEMOTHERAPY INDUCED NEUTROPENIA (HCC): ICD-10-CM

## 2017-04-06 DIAGNOSIS — C83.18 MANTLE CELL LYMPHOMA OF LYMPH NODES OF MULTIPLE REGIONS (HCC): Primary | ICD-10-CM

## 2017-04-06 DIAGNOSIS — D70.1 CHEMOTHERAPY INDUCED NEUTROPENIA (HCC): ICD-10-CM

## 2017-04-06 DIAGNOSIS — R60.0 BILATERAL EDEMA OF LOWER EXTREMITY: ICD-10-CM

## 2017-04-06 DIAGNOSIS — D64.9 ANEMIA, UNSPECIFIED TYPE: ICD-10-CM

## 2017-04-06 DIAGNOSIS — D69.6 THROMBOCYTOPENIA (HCC): ICD-10-CM

## 2017-04-06 LAB
ALBUMIN SERPL BCP-MCNC: 3 G/DL (ref 3.5–5)
ALBUMIN/GLOB SERPL: 0.9 {RATIO} (ref 1.1–2.2)
ALP SERPL-CCNC: 88 U/L (ref 45–117)
ALT SERPL-CCNC: 20 U/L (ref 12–78)
ANION GAP BLD CALC-SCNC: 9 MMOL/L (ref 5–15)
AST SERPL W P-5'-P-CCNC: 19 U/L (ref 15–37)
BASOPHILS # BLD AUTO: 0 K/UL (ref 0–0.1)
BASOPHILS # BLD: 0 % (ref 0–1)
BILIRUB DIRECT SERPL-MCNC: 0.1 MG/DL (ref 0–0.2)
BILIRUB SERPL-MCNC: 0.3 MG/DL (ref 0.2–1)
BUN SERPL-MCNC: 18 MG/DL (ref 6–20)
BUN/CREAT SERPL: 21 (ref 12–20)
CALCIUM SERPL-MCNC: 8.6 MG/DL (ref 8.5–10.1)
CHLORIDE SERPL-SCNC: 105 MMOL/L (ref 97–108)
CO2 SERPL-SCNC: 29 MMOL/L (ref 21–32)
CREAT SERPL-MCNC: 0.84 MG/DL (ref 0.7–1.3)
DIFFERENTIAL METHOD BLD: ABNORMAL
EOSINOPHIL # BLD: 0.1 K/UL (ref 0–0.4)
EOSINOPHIL NFR BLD: 7 % (ref 0–7)
ERYTHROCYTE [DISTWIDTH] IN BLOOD BY AUTOMATED COUNT: 16.1 % (ref 11.5–14.5)
GLOBULIN SER CALC-MCNC: 3.4 G/DL (ref 2–4)
GLUCOSE SERPL-MCNC: 83 MG/DL (ref 65–100)
HCT VFR BLD AUTO: 32.9 % (ref 36.6–50.3)
HGB BLD-MCNC: 10.3 G/DL (ref 12.1–17)
LYMPHOCYTES # BLD AUTO: 22 % (ref 12–49)
LYMPHOCYTES # BLD: 0.5 K/UL (ref 0.8–3.5)
MCH RBC QN AUTO: 27.8 PG (ref 26–34)
MCHC RBC AUTO-ENTMCNC: 31.3 G/DL (ref 30–36.5)
MCV RBC AUTO: 88.7 FL (ref 80–99)
MONOCYTES # BLD: 0.6 K/UL (ref 0–1)
MONOCYTES NFR BLD AUTO: 28 % (ref 5–13)
NEUTS SEG # BLD: 0.9 K/UL (ref 1.8–8)
NEUTS SEG NFR BLD AUTO: 43 % (ref 32–75)
PATH REV BLD -IMP: ABNORMAL
PLATELET # BLD AUTO: 136 K/UL (ref 150–400)
POTASSIUM SERPL-SCNC: 4.4 MMOL/L (ref 3.5–5.1)
PROT SERPL-MCNC: 6.4 G/DL (ref 6.4–8.2)
RBC # BLD AUTO: 3.71 M/UL (ref 4.1–5.7)
RBC MORPH BLD: ABNORMAL
SODIUM SERPL-SCNC: 143 MMOL/L (ref 136–145)
WBC # BLD AUTO: 2.1 K/UL (ref 4.1–11.1)

## 2017-04-06 PROCEDURE — 77030012965 HC NDL HUBR BBMI -A

## 2017-04-06 PROCEDURE — 85025 COMPLETE CBC W/AUTO DIFF WBC: CPT | Performed by: INTERNAL MEDICINE

## 2017-04-06 PROCEDURE — 74011000250 HC RX REV CODE- 250: Performed by: INTERNAL MEDICINE

## 2017-04-06 PROCEDURE — 80048 BASIC METABOLIC PNL TOTAL CA: CPT | Performed by: INTERNAL MEDICINE

## 2017-04-06 PROCEDURE — 80076 HEPATIC FUNCTION PANEL: CPT | Performed by: INTERNAL MEDICINE

## 2017-04-06 PROCEDURE — 74011250636 HC RX REV CODE- 250/636: Performed by: INTERNAL MEDICINE

## 2017-04-06 PROCEDURE — 36415 COLL VENOUS BLD VENIPUNCTURE: CPT | Performed by: INTERNAL MEDICINE

## 2017-04-06 RX ORDER — SODIUM CHLORIDE 0.9 % (FLUSH) 0.9 %
10-40 SYRINGE (ML) INJECTION AS NEEDED
Status: ACTIVE | OUTPATIENT
Start: 2017-04-06 | End: 2017-04-06

## 2017-04-06 RX ORDER — SODIUM CHLORIDE 9 MG/ML
10 INJECTION INTRAMUSCULAR; INTRAVENOUS; SUBCUTANEOUS AS NEEDED
Status: DISPENSED | OUTPATIENT
Start: 2017-04-06 | End: 2017-04-07

## 2017-04-06 RX ORDER — HEPARIN 100 UNIT/ML
500 SYRINGE INTRAVENOUS AS NEEDED
Status: ACTIVE | OUTPATIENT
Start: 2017-04-06 | End: 2017-04-07

## 2017-04-06 RX ADMIN — Medication 10 ML: at 13:36

## 2017-04-06 RX ADMIN — SODIUM CHLORIDE 10 ML: 9 INJECTION INTRAMUSCULAR; INTRAVENOUS; SUBCUTANEOUS at 11:14

## 2017-04-06 RX ADMIN — Medication 20 ML: at 11:15

## 2017-04-06 RX ADMIN — HEPARIN SODIUM (PORCINE) LOCK FLUSH IV SOLN 100 UNIT/ML 500 UNITS: 100 SOLUTION at 13:36

## 2017-04-06 NOTE — MR AVS SNAPSHOT
Visit Information Date & Time Provider Department Dept. Phone Encounter #  
 4/6/2017 11:15 AM Chase Hodgkin, NP [address] at [address] [facility] [address] [id number] Follow-up Instructions Return in 4 weeks (on 5/4/2017) for Lupe malhotra [place] #6. Your Appointments 5/4/2017 11:45 AM  
ESTABLISHED PATIENT with Chase Hodgkin, NP  
[address] at [facility]) Appt Note: Lupe malhotra [place] #6  
 [address] [phone] [id number]  
[phone]  
  
   
 [address], [address] [facility] Upcoming Health Maintenance Date Due DTaP/Tdap/Td series (1 - Tdap) 7/5/1964 ZOSTER VACCINE AGE 60> 7/5/2003 GLAUCOMA SCREENING Q2Y 7/5/2008 Pneumococcal 65+ High/Highest Risk (1 of 2 - PCV13) 7/5/2008 MEDICARE YEARLY EXAM 7/5/2008 INFLUENZA AGE 9 TO ADULT 8/1/2016 FOBT Q 1 YEAR AGE 50-75 11/11/2017 Allergies as of 4/6/2017  Review Complete On: 4/6/2017 By: Chase Hodgkin, NP No Known Allergies Current Immunizations  Reviewed on 4/6/2017 No immunizations on file. Reviewed by Jessica Salvador RN on 4/6/2017 at 11:03 AM  
You Were Diagnosed With   
  
 Codes Comments Mantle cell lymphoma of lymph nodes of multiple regions [facility])    -  Primary ICD-10-CM: C83.18 
ICD-9-CM: 200.48 Vitals BP Pulse Temp Resp Height(growth percentile) 145/89 (BP 1 Location: Left arm, BP Patient Position: Sitting) 67 96.3 °F (35.7 °C) (Temporal) 18 5' 11\" (1.803 m) Weight(growth percentile) SpO2 BMI Smoking Status 225 lb 9.6 oz (102.3 kg) 97% 31.46 kg/m2 Former Smoker BMI and BSA Data Body Mass Index Body Surface Area  
 31.46 kg/m 2 2.26 m 2 Preferred Pharmacy Pharmacy Name Phone [facility] [address], [address] [phone] Your Updated Medication List  
  
   
 This list is accurate as of: 4/6/17 11:41 AM.  Always use your most recent med list.  
  
  
  
  
 enoxaparin 120 mg/0.8 mL injection Commonly known as:  LOVENOX  
120 mg by SubCUTAneous route every twelve (12) hours. ferrous sulfate 325 mg (65 mg iron) tablet Take 1 Tab by mouth two (2) times daily (with meals). lidocaine-prilocaine topical cream  
Commonly known as:  EMLA Apply  to affected area as needed for Pain (Apply 30-60 min. prior to having your port accessed). ondansetron hcl 8 mg tablet Commonly known as:  Nova Or Take 1 Tab by mouth every eight (8) hours as needed for Nausea. prochlorperazine 10 mg tablet Commonly known as:  COMPAZINE Take 1 Tab by mouth every six (6) hours as needed for Nausea. Follow-up Instructions Return in 4 weeks (on 5/4/2017) for labs, Yudelka Andrade #6. To-Do List   
 04/07/2017 11:00 AM  
  Appointment with [address] at [place] [address] ([phone])  
  
 05/04/2017 11:00 AM  
  Appointment with [address] at [place] [address] ([phone])  
  
 05/05/2017 11:00 AM  
  Appointment with [address] at [place] [address] ([phone]) [facility]! Dear Alea Stewart: Thank you for requesting a [facility] account. Our records indicate that you already have an active [facility] account. You can access your account anytime at [email or web address]. [employer] Did you know that you can access your hospital and ER discharge instructions at any time in [employer]? You can also review all of your test results from your hospital stay or ER visit. Additional Information If you have questions, please visit the Frequently Asked Questions section of the [facility] website at [email or web address]. [employer] Remember, [facility] is NOT to be used for urgent needs. For medical emergencies, dial 911. Now available from your iPhone and Android! Please provide this summary of care documentation to your next provider. Your primary care clinician is listed as Wendy Wlilis. If you have any questions after today's visit, please call [phone].

## 2017-04-06 NOTE — PROGRESS NOTES
Problem: Knowledge Deficit  Goal: *Verbalizes understanding of procedures and medications  Outcome: Progressing Towards Goal  PT arrived at [facility] ambulatory and in no distress for Bendeka/Rituxan C5D1, [place] held retry in one week.

## 2017-04-06 NOTE — PROGRESS NOTES
[id number] [facility] at [facility]  [phone]    Hematology / Oncology Follow Up    Reason for Visit:   Eamon Saba is a 66 y.o. male who is seen for follow up of lymphoma. Treatment History:   · CTA Chest 11/11/2016: PE in left lower lobe pulmonary arteries, extensive adneopathy  · CT A/P 11/12/2016: Cirrhosis, massive splenomegaly, ascites, massive lymphadenopathy  · US guided axillary node biopsy 11/14/2016: CD5+ B-cell non-hodgkin lymphoma, phenotype most consistent with mantle cell lymphoma. FISH with t(11;14)  · PET-CT 11/23/2016: Marked splenomegaly with increased metabolic activity consistent with lymphoma. Bilateral cervical and axillary adenopathy. Mediastinal and right hilar and left diaphragmatic adenopathy. Bilateral pelvic and inguinal adenopathy. Multiple small mesenteric and retroperitoneal nodes with low grade or no activity. · BMBx 11/29/2016: Hypercellular marrow involved by mantle cell lymphoma  · Stage III Mantle Cell Lymphoma  · Palliative chemotherapy with Rituximab and Bendamustine beginning 12/8/2016    History of Present Illness:   Here today for follow up. He states he continues to feel well. Mild leg swelling persists some. Denies cough, SOB, fever. Energy good. Reports compliance with Lovenox bid. Appetite good. No complaints. PAST HISTORY: The following sections were reviewed and updated in the EMR as appropriate: PMH, SH, FH, Medications, Allergies. No Known Allergies       Review of Systems: A complete review of systems was obtained, reviewed, and scanned into the EMR. Pertinent findings reviewed above.       Physical Exam:     Visit Vitals    /89 (BP 1 Location: Left arm, BP Patient Position: Sitting)    Pulse 67    Temp 96.3 °F (35.7 °C) (Temporal)    Resp 18    Ht 5' 11\" (1.803 m)    Wt 225 lb 9.6 oz (102.3 kg)    SpO2 97%    BMI 31.46 kg/m2     ECOG PS: 1  General: No distress  Eyes: PERRLA, anicteric sclerae  HENT: Atraumatic, OP clear  Neck: Supple  Lymphatic: No cervical, supraclavicular, or inguinal adenopathy  Respiratory: CTAB, normal respiratory effort  CV: Normal rate, regular rhythm, no murmurs. Bilateral LE edema, 1+, compression stockings in place  GI: Soft, nontender, nondistended, no masses, no hepatomegaly, no splenomegaly  MS: Normal gait and station. Digits without clubbing or cyanosis. Skin: No rashes, ecchymoses, or petechiae. Normal temperature, turgor, and texture. Psych: Alert, oriented, appropriate affect, normal judgment/insight    Results:     Lab Results   Component Value Date/Time    WBC 2.1 04/06/2017 11:35 AM    HGB 10.3 04/06/2017 11:35 AM    HCT 32.9 04/06/2017 11:35 AM    PLATELET 256 24/32/0168 11:35 AM    MCV 88.7 04/06/2017 11:35 AM    ABS. NEUTROPHILS 0.9 04/06/2017 11:35 AM    Hemoglobin (POC) 10.9 01/12/2017 08:02 AM    Hematocrit (POC) 32 01/12/2017 08:02 AM     Lab Results   Component Value Date/Time    Sodium 143 04/06/2017 11:14 AM    Potassium 4.4 04/06/2017 11:14 AM    Chloride 105 04/06/2017 11:14 AM    CO2 29 04/06/2017 11:14 AM    Glucose 83 04/06/2017 11:14 AM    BUN 18 04/06/2017 11:14 AM    Creatinine 0.84 04/06/2017 11:14 AM    GFR est AA >60 04/06/2017 11:14 AM    GFR est non-AA >60 04/06/2017 11:14 AM    Calcium 8.6 04/06/2017 11:14 AM    Sodium (POC) 143 01/12/2017 08:02 AM    Potassium (POC) 4.0 01/12/2017 08:02 AM    Chloride (POC) 104 01/12/2017 08:02 AM    Glucose (POC) 156 01/12/2017 08:02 AM    BUN (POC) 23 01/12/2017 08:02 AM    Creatinine (POC) 0.8 01/12/2017 08:02 AM    Calcium, ionized (POC) 1.19 01/12/2017 08:02 AM     Lab Results   Component Value Date/Time    Bilirubin, total 0.3 04/06/2017 11:14 AM    ALT (SGPT) 20 04/06/2017 11:14 AM    AST (SGOT) 19 04/06/2017 11:14 AM    Alk.  phosphatase 88 04/06/2017 11:14 AM    Protein, total 6.4 04/06/2017 11:14 AM    Albumin 3.0 04/06/2017 11:14 AM    Globulin 3.4 04/06/2017 11:14 AM     Lab Results   Component Value Date/Time    Reticulocyte count 1.8 11/10/2016 02:00 PM    Iron % saturation 11 11/10/2016 02:00 PM    Iron 26 11/10/2016 02:00 PM    TIBC 240 11/10/2016 02:00 PM    Ferritin 100 11/10/2016 02:00 PM    Vitamin B12 415 11/10/2016 02:00 PM    Folate 15.6 11/10/2016 02:00 PM    Haptoglobin 186 11/11/2016 12:04 PM     11/10/2016 02:00 PM    Beta-2 Microglobulin, serum 4.4 11/15/2016 05:21 AM    TSH 4.06 11/10/2016 11:21 AM    Hep C  virus Ab Interp. NONREACTIVE 08/02/2016 08:54 AM     Lab Results   Component Value Date/Time    INR 1.1 11/11/2016 12:04 PM    aPTT 31.3 11/11/2016 12:04 PM    Fibrinogen 215 11/11/2016 12:04 PM       HepB/C negative  TTE 11/10/2016: EF 60%    Bone marrow biopsy 11/29/2016: Hypercellular marrow involved by mantle cell lymphoma    Assessment:   1) Mantle cell lymphoma  Stage IV  His cancer is not curable and management is with palliative intent. Bone marrow results are positive for marrow involvement, increasing this to stage IV. His MIPI score is 6.24, which is High Risk and cooresponds to a median survival of 37 months and 5-year OS of 20%. He is currently on chemotherapy with R-Bendamustine with plans for 6 cycles, potentially followed by maintenance rituximab. He continues tolerating therapy very well. Unfortunately, he is neutropenic today. We will hold therapy and plan to proceed next week. 2) Depression  Stable. Off lexapro. 3) Cirrhosis  Unclear if this is related to the lymphoma. GI following. 4) DVT, PE  Diagnosed 11/11/2016. Malignancy associated. Continue lovenox bid. 5) Anemia  Stable. Secondary to #1, and perhaps iron deficiency. He stopped the iron supplements due to nausea. Monitor. 6) Thrombocytopenia  Stable. Secondary to splenomegaly. Monitor and treat lymphoma. 7) Neutropenia  Secondary to therapy. Hold therapy as above, retry next week. Monitor.     Plan:     · Hold therapy today   · Plan to proceed next week with C#5 R-Bendamustine  · Labs prior to each treatment: CBC with diff, CMP on day 1  · Antiemetic prophylaxis: Ondansetron and Dexamethasone on day 1 and 2  · Infusion reaction prophylaxis: acetaminophen and diphenhydramine prior to Rituximab  · PRN antiemetics at home: Ondansetron, Prochlorparazine  · Neulasta 24-72 hours after each cycle  · Continue lovenox 1mg/kig BID  · Return to clinic in 5 weeks with next cycle of therapy      Signed By: Dedrick Irvin MD     April 6, 2017

## 2017-04-06 NOTE — PROGRESS NOTES
Outpatient Infusion Center Nursing Progress Note    1100  Patient arrived to [facility] accompanied by family for scheduled Bendeka/Rituxan  Cycle 5 Day 1. PT denies complaints. R port  Accessed with  0.75 in. guallpa needle, labs drawn, port flushed, positive blood return noted. Vitals Blood pressure (!) 152/95, pulse 65, temperature 97 °F (36.1 °C), resp. rate 18, height 5' 10.87\" (1.8 m), weight 102.6 kg (226 lb 3.2 oz). labs   Recent Results (from the past 12 hour(s))   METABOLIC PANEL, BASIC    Collection Time: 04/06/17 11:14 AM   Result Value Ref Range    Sodium 143 136 - 145 mmol/L    Potassium 4.4 3.5 - 5.1 mmol/L    Chloride 105 97 - 108 mmol/L    CO2 29 21 - 32 mmol/L    Anion gap 9 5 - 15 mmol/L    Glucose 83 65 - 100 mg/dL    BUN 18 6 - 20 MG/DL    Creatinine 0.84 0.70 - 1.30 MG/DL    BUN/Creatinine ratio 21 (H) 12 - 20      GFR est AA >60 >60 ml/min/1.73m2    GFR est non-AA >60 >60 ml/min/1.73m2    Calcium 8.6 8.5 - 10.1 MG/DL   HEPATIC FUNCTION PANEL    Collection Time: 04/06/17 11:14 AM   Result Value Ref Range    Protein, total 6.4 6.4 - 8.2 g/dL    Albumin 3.0 (L) 3.5 - 5.0 g/dL    Globulin 3.4 2.0 - 4.0 g/dL    A-G Ratio 0.9 (L) 1.1 - 2.2      Bilirubin, total 0.3 0.2 - 1.0 MG/DL    Bilirubin, direct 0.1 0.0 - 0.2 MG/DL    Alk. phosphatase 88 45 - 117 U/L    AST (SGOT) 19 15 - 37 U/L    ALT (SGPT) 20 12 - 78 U/L   CBC WITH AUTOMATED DIFF    Collection Time: 04/06/17 11:35 AM   Result Value Ref Range    WBC 2.1 (L) 4.1 - 11.1 K/uL    RBC 3.71 (L) 4.10 - 5.70 M/uL    HGB 10.3 (L) 12.1 - 17.0 g/dL    HCT 32.9 (L) 36.6 - 50.3 %    MCV 88.7 80.0 - 99.0 FL    MCH 27.8 26.0 - 34.0 PG    MCHC 31.3 30.0 - 36.5 g/dL    RDW 16.1 (H) 11.5 - 14.5 %    PLATELET 569 (L) 757 - 400 K/uL    NEUTROPHILS 43 32 - 75 %    LYMPHOCYTES 22 12 - 49 %    MONOCYTES 28 (H) 5 - 13 %    EOSINOPHILS 7 0 - 7 %    BASOPHILS 0 0 - 1 %    ABS. NEUTROPHILS 0.9 (L) 1.8 - 8.0 K/UL    ABS.  LYMPHOCYTES 0.5 (L) 0.8 - 3.5 K/UL    ABS. MONOCYTES 0.6 0.0 - 1.0 K/UL    ABS. EOSINOPHILS 0.1 0.0 - 0.4 K/UL    ABS. BASOPHILS 0.0 0.0 - 0.1 K/UL    DF MANUAL      RBC COMMENTS ANISOCYTOSIS  1+         Call to Dorina Harman RN to report ANC 0.9 , verbal order to hold and retry in one week. Port flushed, +blood return noted and de-accessed. Next appointment,  4/13/17 @ [phone] D/C from [facility] ambulatory in no distress, accompanied by self.

## 2017-04-07 ENCOUNTER — HOSPITAL ENCOUNTER (OUTPATIENT)
Dept: INFUSION THERAPY | Age: 72
Discharge: HOME | End: 2017-04-07
Payer: MEDICARE

## 2017-04-10 RX ORDER — ACETAMINOPHEN 325 MG/1
650 TABLET ORAL ONCE
Status: COMPLETED | OUTPATIENT
Start: 2017-04-13 | End: 2017-04-13

## 2017-04-10 RX ORDER — DIPHENHYDRAMINE HYDROCHLORIDE 50 MG/ML
50 INJECTION, SOLUTION INTRAMUSCULAR; INTRAVENOUS ONCE
Status: COMPLETED | OUTPATIENT
Start: 2017-04-13 | End: 2017-04-13

## 2017-04-10 RX ORDER — ACETAMINOPHEN 325 MG/1
650 TABLET ORAL
Status: ACTIVE | OUTPATIENT
Start: 2017-04-13 | End: 2017-04-13

## 2017-04-10 RX ORDER — SODIUM CHLORIDE 9 MG/ML
100 INJECTION, SOLUTION INTRAVENOUS CONTINUOUS
Status: DISPENSED | OUTPATIENT
Start: 2017-04-13 | End: 2017-04-13

## 2017-04-10 RX ORDER — DIPHENHYDRAMINE HYDROCHLORIDE 50 MG/ML
50 INJECTION, SOLUTION INTRAMUSCULAR; INTRAVENOUS
Status: ACTIVE | OUTPATIENT
Start: 2017-04-13 | End: 2017-04-13

## 2017-04-10 RX ORDER — DEXAMETHASONE SODIUM PHOSPHATE 4 MG/ML
12 INJECTION, SOLUTION INTRA-ARTICULAR; INTRALESIONAL; INTRAMUSCULAR; INTRAVENOUS; SOFT TISSUE ONCE
Status: COMPLETED | OUTPATIENT
Start: 2017-04-14 | End: 2017-04-14

## 2017-04-10 RX ORDER — DEXAMETHASONE SODIUM PHOSPHATE 4 MG/ML
12 INJECTION, SOLUTION INTRA-ARTICULAR; INTRALESIONAL; INTRAMUSCULAR; INTRAVENOUS; SOFT TISSUE ONCE
Status: COMPLETED | OUTPATIENT
Start: 2017-04-13 | End: 2017-04-13

## 2017-04-10 RX ORDER — PALONOSETRON 0.05 MG/ML
0.25 INJECTION, SOLUTION INTRAVENOUS ONCE
Status: COMPLETED | OUTPATIENT
Start: 2017-04-13 | End: 2017-04-13

## 2017-04-10 RX ORDER — SODIUM CHLORIDE 9 MG/ML
100 INJECTION, SOLUTION INTRAVENOUS CONTINUOUS
Status: DISPENSED | OUTPATIENT
Start: 2017-04-14 | End: 2017-04-14

## 2017-04-13 ENCOUNTER — HOSPITAL ENCOUNTER (OUTPATIENT)
Dept: INFUSION THERAPY | Age: 72
Discharge: HOME | End: 2017-04-13
Payer: MEDICARE

## 2017-04-13 VITALS
WEIGHT: 226 LBS | TEMPERATURE: 97.1 F | HEIGHT: 71 IN | SYSTOLIC BLOOD PRESSURE: 124 MMHG | RESPIRATION RATE: 18 BRPM | BODY MASS INDEX: 31.64 KG/M2 | DIASTOLIC BLOOD PRESSURE: 77 MMHG | HEART RATE: 56 BPM

## 2017-04-13 LAB
ALBUMIN SERPL BCP-MCNC: 3 G/DL (ref 3.5–5)
ALBUMIN/GLOB SERPL: 0.9 {RATIO} (ref 1.1–2.2)
ALP SERPL-CCNC: 95 U/L (ref 45–117)
ALT SERPL-CCNC: 23 U/L (ref 12–78)
ANION GAP BLD CALC-SCNC: 5 MMOL/L (ref 5–15)
AST SERPL W P-5'-P-CCNC: 19 U/L (ref 15–37)
BASOPHILS # BLD AUTO: 0 K/UL (ref 0–0.1)
BASOPHILS # BLD: 1 % (ref 0–1)
BILIRUB DIRECT SERPL-MCNC: 0.1 MG/DL (ref 0–0.2)
BILIRUB SERPL-MCNC: 0.3 MG/DL (ref 0.2–1)
BUN SERPL-MCNC: 18 MG/DL (ref 6–20)
BUN/CREAT SERPL: 17 (ref 12–20)
CALCIUM SERPL-MCNC: 8.4 MG/DL (ref 8.5–10.1)
CHLORIDE SERPL-SCNC: 105 MMOL/L (ref 97–108)
CO2 SERPL-SCNC: 31 MMOL/L (ref 21–32)
CREAT SERPL-MCNC: 1.03 MG/DL (ref 0.7–1.3)
DIFFERENTIAL METHOD BLD: ABNORMAL
EOSINOPHIL # BLD: 0.2 K/UL (ref 0–0.4)
EOSINOPHIL NFR BLD: 8 % (ref 0–7)
ERYTHROCYTE [DISTWIDTH] IN BLOOD BY AUTOMATED COUNT: 15.8 % (ref 11.5–14.5)
GLOBULIN SER CALC-MCNC: 3.3 G/DL (ref 2–4)
GLUCOSE SERPL-MCNC: 165 MG/DL (ref 65–100)
HCT VFR BLD AUTO: 32.6 % (ref 36.6–50.3)
HGB BLD-MCNC: 10.9 G/DL (ref 12.1–17)
LYMPHOCYTES # BLD AUTO: 23 % (ref 12–49)
LYMPHOCYTES # BLD: 0.5 K/UL (ref 0.8–3.5)
MCH RBC QN AUTO: 29.5 PG (ref 26–34)
MCHC RBC AUTO-ENTMCNC: 33.4 G/DL (ref 30–36.5)
MCV RBC AUTO: 88.1 FL (ref 80–99)
MONOCYTES # BLD: 0.3 K/UL (ref 0–1)
MONOCYTES NFR BLD AUTO: 15 % (ref 5–13)
NEUTS SEG # BLD: 1.2 K/UL (ref 1.8–8)
NEUTS SEG NFR BLD AUTO: 53 % (ref 32–75)
PLATELET # BLD AUTO: 157 K/UL (ref 150–400)
POTASSIUM SERPL-SCNC: 4.3 MMOL/L (ref 3.5–5.1)
PROT SERPL-MCNC: 6.3 G/DL (ref 6.4–8.2)
RBC # BLD AUTO: 3.7 M/UL (ref 4.1–5.7)
RBC MORPH BLD: ABNORMAL
SODIUM SERPL-SCNC: 141 MMOL/L (ref 136–145)
WBC # BLD AUTO: 2.2 K/UL (ref 4.1–11.1)

## 2017-04-13 PROCEDURE — 74011000250 HC RX REV CODE- 250

## 2017-04-13 PROCEDURE — 74011250636 HC RX REV CODE- 250/636

## 2017-04-13 PROCEDURE — 74011250636 HC RX REV CODE- 250/636: Performed by: INTERNAL MEDICINE

## 2017-04-13 PROCEDURE — 74011000258 HC RX REV CODE- 258: Performed by: INTERNAL MEDICINE

## 2017-04-13 PROCEDURE — 36415 COLL VENOUS BLD VENIPUNCTURE: CPT | Performed by: INTERNAL MEDICINE

## 2017-04-13 PROCEDURE — 96375 TX/PRO/DX INJ NEW DRUG ADDON: CPT

## 2017-04-13 PROCEDURE — 77030012965 HC NDL HUBR BBMI -A

## 2017-04-13 PROCEDURE — 85025 COMPLETE CBC W/AUTO DIFF WBC: CPT | Performed by: INTERNAL MEDICINE

## 2017-04-13 PROCEDURE — 80076 HEPATIC FUNCTION PANEL: CPT | Performed by: INTERNAL MEDICINE

## 2017-04-13 PROCEDURE — 74011250637 HC RX REV CODE- 250/637: Performed by: INTERNAL MEDICINE

## 2017-04-13 PROCEDURE — 96415 CHEMO IV INFUSION ADDL HR: CPT

## 2017-04-13 PROCEDURE — 96411 CHEMO IV PUSH ADDL DRUG: CPT

## 2017-04-13 PROCEDURE — 96413 CHEMO IV INFUSION 1 HR: CPT

## 2017-04-13 PROCEDURE — 80048 BASIC METABOLIC PNL TOTAL CA: CPT | Performed by: INTERNAL MEDICINE

## 2017-04-13 RX ORDER — SODIUM CHLORIDE 0.9 % (FLUSH) 0.9 %
10 SYRINGE (ML) INJECTION AS NEEDED
Status: ACTIVE | OUTPATIENT
Start: 2017-04-13 | End: 2017-04-14

## 2017-04-13 RX ORDER — HEPARIN 100 UNIT/ML
500 SYRINGE INTRAVENOUS AS NEEDED
Status: ACTIVE | OUTPATIENT
Start: 2017-04-13 | End: 2017-04-14

## 2017-04-13 RX ORDER — SODIUM CHLORIDE 9 MG/ML
10 INJECTION INTRAMUSCULAR; INTRAVENOUS; SUBCUTANEOUS AS NEEDED
Status: DISPENSED | OUTPATIENT
Start: 2017-04-13 | End: 2017-04-14

## 2017-04-13 RX ADMIN — SODIUM CHLORIDE 100 ML/HR: 900 INJECTION, SOLUTION INTRAVENOUS at 08:58

## 2017-04-13 RX ADMIN — BENDAMUSTINE HYDROCHLORIDE 210 MG: 25 INJECTION, SOLUTION INTRAVENOUS at 11:20

## 2017-04-13 RX ADMIN — SODIUM CHLORIDE, PRESERVATIVE FREE 500 UNITS: 5 INJECTION INTRAVENOUS at 11:40

## 2017-04-13 RX ADMIN — RITUXIMAB 875 MG: 10 INJECTION, SOLUTION INTRAVENOUS at 09:40

## 2017-04-13 RX ADMIN — Medication 10 ML: at 08:01

## 2017-04-13 RX ADMIN — SODIUM CHLORIDE 10 ML: 9 INJECTION INTRAMUSCULAR; INTRAVENOUS; SUBCUTANEOUS at 08:00

## 2017-04-13 RX ADMIN — DIPHENHYDRAMINE HYDROCHLORIDE 50 MG: 50 INJECTION INTRAMUSCULAR; INTRAVENOUS at 09:01

## 2017-04-13 RX ADMIN — DEXAMETHASONE SODIUM PHOSPHATE 12 MG: 4 INJECTION, SOLUTION INTRA-ARTICULAR; INTRALESIONAL; INTRAMUSCULAR; INTRAVENOUS; SOFT TISSUE at 09:04

## 2017-04-13 RX ADMIN — Medication 10 ML: at 11:40

## 2017-04-13 RX ADMIN — ACETAMINOPHEN 650 MG: 325 TABLET ORAL at 08:58

## 2017-04-13 RX ADMIN — Medication 10 ML: at 08:00

## 2017-04-13 RX ADMIN — PALONOSETRON HYDROCHLORIDE 0.25 MG: 0.25 INJECTION INTRAVENOUS at 09:02

## 2017-04-13 NOTE — PROGRESS NOTES
[facility] VISIT NOTE    0730  Pt arrived at [facility] ambulatory and in no distress for C5D1 Rituxan + Bendamustine. Assessment completed, pt denies any complaints. Blood pressure 125/77, pulse 74, temperature 96.8 °F (36 °C), resp. rate 18, height 5' 10.87\" (1.8 m), weight 102.5 kg (226 lb). Right chest port accessed with 0.75 in guallpa no difficulty. Positive blood return noted and labs drawn. Lab results are within treatment parameters. Recent Results (from the past 12 hour(s))   CBC WITH AUTOMATED DIFF    Collection Time: 04/13/17  7:56 AM   Result Value Ref Range    WBC 2.2 (L) 4.1 - 11.1 K/uL    RBC 3.70 (L) 4.10 - 5.70 M/uL    HGB 10.9 (L) 12.1 - 17.0 g/dL    HCT 32.6 (L) 36.6 - 50.3 %    MCV 88.1 80.0 - 99.0 FL    MCH 29.5 26.0 - 34.0 PG    MCHC 33.4 30.0 - 36.5 g/dL    RDW 15.8 (H) 11.5 - 14.5 %    PLATELET 108 779 - 402 K/uL    NEUTROPHILS 53 32 - 75 %    LYMPHOCYTES 23 12 - 49 %    MONOCYTES 15 (H) 5 - 13 %    EOSINOPHILS 8 (H) 0 - 7 %    BASOPHILS 1 0 - 1 %    ABS. NEUTROPHILS 1.2 (L) 1.8 - 8.0 K/UL    ABS. LYMPHOCYTES 0.5 (L) 0.8 - 3.5 K/UL    ABS. MONOCYTES 0.3 0.0 - 1.0 K/UL    ABS. EOSINOPHILS 0.2 0.0 - 0.4 K/UL    ABS.  BASOPHILS 0.0 0.0 - 0.1 K/UL    DF SMEAR SCANNED      RBC COMMENTS ANISOCYTOSIS  1+       METABOLIC PANEL, BASIC    Collection Time: 04/13/17  7:56 AM   Result Value Ref Range    Sodium 141 136 - 145 mmol/L    Potassium 4.3 3.5 - 5.1 mmol/L    Chloride 105 97 - 108 mmol/L    CO2 31 21 - 32 mmol/L    Anion gap 5 5 - 15 mmol/L    Glucose 165 (H) 65 - 100 mg/dL    BUN 18 6 - 20 MG/DL    Creatinine 1.03 0.70 - 1.30 MG/DL    BUN/Creatinine ratio 17 12 - 20      GFR est AA >60 >60 ml/min/1.73m2    GFR est non-AA >60 >60 ml/min/1.73m2    Calcium 8.4 (L) 8.5 - 10.1 MG/DL   HEPATIC FUNCTION PANEL    Collection Time: 04/13/17  7:56 AM   Result Value Ref Range    Protein, total 6.3 (L) 6.4 - 8.2 g/dL    Albumin 3.0 (L) 3.5 - 5.0 g/dL    Globulin 3.3 2.0 - 4.0 g/dL    A-G Ratio 0.9 (L) 1.1 - 2.2      Bilirubin, total 0.3 0.2 - 1.0 MG/DL    Bilirubin, direct 0.1 0.0 - 0.2 MG/DL    Alk. phosphatase 95 45 - 117 U/L    AST (SGOT) 19 15 - 37 U/L    ALT (SGPT) 23 12 - 78 U/L     Medications received:  NS IV  Tylenol PO  Benadryl IVP  Aloxi IVP  Dexamethasone IVP  Rituxan IV rapid infusion  Bendamustine IV    Patient Vitals for the past 12 hrs:   Temp Pulse Resp BP   04/13/17 1145 97.1 °F (36.2 °C) (!) 56 18 124/77   04/13/17 1041 96.8 °F (36 °C) 81 18 128/77   04/13/17 1015 96 °F (35.6 °C) (!) 54 18 113/69   04/13/17 0747 96.8 °F (36 °C) 74 18 125/77     Tolerated treatment well, no adverse reaction noted. Port de-accessed and flushed per protocol. Positive blood return noted. Dressing secured for treatment tomorrow. 1145  D/C'd from [facility] ambulatory and in no distress accompanied by his daughter.  Next appointment is 4/14/17 at 10am.

## 2017-04-14 ENCOUNTER — HOSPITAL ENCOUNTER (OUTPATIENT)
Dept: INFUSION THERAPY | Age: 72
Discharge: HOME | End: 2017-04-14
Payer: MEDICARE

## 2017-04-14 VITALS
RESPIRATION RATE: 16 BRPM | BODY MASS INDEX: 31.64 KG/M2 | HEIGHT: 71 IN | TEMPERATURE: 97 F | OXYGEN SATURATION: 96 % | SYSTOLIC BLOOD PRESSURE: 119 MMHG | WEIGHT: 226 LBS | DIASTOLIC BLOOD PRESSURE: 73 MMHG | HEART RATE: 60 BPM

## 2017-04-14 PROCEDURE — 74011250636 HC RX REV CODE- 250/636: Performed by: INTERNAL MEDICINE

## 2017-04-14 PROCEDURE — 74011250636 HC RX REV CODE- 250/636

## 2017-04-14 PROCEDURE — 74011000258 HC RX REV CODE- 258: Performed by: INTERNAL MEDICINE

## 2017-04-14 PROCEDURE — 96375 TX/PRO/DX INJ NEW DRUG ADDON: CPT

## 2017-04-14 PROCEDURE — 96409 CHEMO IV PUSH SNGL DRUG: CPT

## 2017-04-14 RX ORDER — SODIUM CHLORIDE 0.9 % (FLUSH) 0.9 %
10 SYRINGE (ML) INJECTION AS NEEDED
Status: DISCONTINUED | OUTPATIENT
Start: 2017-04-14 | End: 2017-04-18 | Stop reason: HOSPADM

## 2017-04-14 RX ORDER — SODIUM CHLORIDE 9 MG/ML
10 INJECTION INTRAMUSCULAR; INTRAVENOUS; SUBCUTANEOUS AS NEEDED
Status: DISCONTINUED | OUTPATIENT
Start: 2017-04-14 | End: 2017-04-14

## 2017-04-14 RX ORDER — HEPARIN 100 UNIT/ML
500 SYRINGE INTRAVENOUS AS NEEDED
Status: ACTIVE | OUTPATIENT
Start: 2017-04-14 | End: 2017-04-15

## 2017-04-14 RX ADMIN — HEPARIN 500 UNITS: 100 SYRINGE at 11:00

## 2017-04-14 RX ADMIN — Medication 10 ML: at 11:00

## 2017-04-14 RX ADMIN — BENDAMUSTINE HYDROCHLORIDE 210 MG: 25 INJECTION, SOLUTION INTRAVENOUS at 10:35

## 2017-04-14 RX ADMIN — DEXAMETHASONE SODIUM PHOSPHATE 12 MG: 4 INJECTION, SOLUTION INTRA-ARTICULAR; INTRALESIONAL; INTRAMUSCULAR; INTRAVENOUS; SOFT TISSUE at 10:05

## 2017-04-14 RX ADMIN — SODIUM CHLORIDE 100 ML/HR: 900 INJECTION, SOLUTION INTRAVENOUS at 10:05

## 2017-05-04 ENCOUNTER — APPOINTMENT (OUTPATIENT)
Dept: INFUSION THERAPY | Age: 72
End: 2017-05-04
Payer: MEDICARE

## 2017-05-05 ENCOUNTER — APPOINTMENT (OUTPATIENT)
Dept: INFUSION THERAPY | Age: 72
End: 2017-05-05
Payer: MEDICARE

## 2017-05-05 RX ORDER — DIPHENHYDRAMINE HYDROCHLORIDE 50 MG/ML
50 INJECTION, SOLUTION INTRAMUSCULAR; INTRAVENOUS ONCE
Status: COMPLETED | OUTPATIENT
Start: 2017-05-11 | End: 2017-05-11

## 2017-05-05 RX ORDER — DEXAMETHASONE SODIUM PHOSPHATE 4 MG/ML
12 INJECTION, SOLUTION INTRA-ARTICULAR; INTRALESIONAL; INTRAMUSCULAR; INTRAVENOUS; SOFT TISSUE ONCE
Status: COMPLETED | OUTPATIENT
Start: 2017-05-11 | End: 2017-05-11

## 2017-05-05 RX ORDER — PALONOSETRON 0.05 MG/ML
0.25 INJECTION, SOLUTION INTRAVENOUS ONCE
Status: COMPLETED | OUTPATIENT
Start: 2017-05-11 | End: 2017-05-11

## 2017-05-05 RX ORDER — SODIUM CHLORIDE 9 MG/ML
100 INJECTION, SOLUTION INTRAVENOUS CONTINUOUS
Status: DISPENSED | OUTPATIENT
Start: 2017-05-11 | End: 2017-05-11

## 2017-05-05 RX ORDER — DIPHENHYDRAMINE HYDROCHLORIDE 50 MG/ML
50 INJECTION, SOLUTION INTRAMUSCULAR; INTRAVENOUS
Status: ACTIVE | OUTPATIENT
Start: 2017-05-11 | End: 2017-05-11

## 2017-05-05 RX ORDER — ACETAMINOPHEN 325 MG/1
650 TABLET ORAL ONCE
Status: COMPLETED | OUTPATIENT
Start: 2017-05-11 | End: 2017-05-11

## 2017-05-05 RX ORDER — ACETAMINOPHEN 325 MG/1
650 TABLET ORAL
Status: ACTIVE | OUTPATIENT
Start: 2017-05-11 | End: 2017-05-11

## 2017-05-08 RX ORDER — DEXAMETHASONE SODIUM PHOSPHATE 4 MG/ML
12 INJECTION, SOLUTION INTRA-ARTICULAR; INTRALESIONAL; INTRAMUSCULAR; INTRAVENOUS; SOFT TISSUE ONCE
Status: COMPLETED | OUTPATIENT
Start: 2017-05-12 | End: 2017-05-12

## 2017-05-08 RX ORDER — SODIUM CHLORIDE 9 MG/ML
100 INJECTION, SOLUTION INTRAVENOUS CONTINUOUS
Status: DISPENSED | OUTPATIENT
Start: 2017-05-12 | End: 2017-05-12

## 2017-05-10 ENCOUNTER — HOSPITAL ENCOUNTER (OUTPATIENT)
Dept: INFUSION THERAPY | Age: 72
Discharge: HOME | End: 2017-05-10
Payer: MEDICARE

## 2017-05-10 VITALS
RESPIRATION RATE: 20 BRPM | DIASTOLIC BLOOD PRESSURE: 69 MMHG | SYSTOLIC BLOOD PRESSURE: 109 MMHG | HEART RATE: 65 BPM | TEMPERATURE: 97.5 F

## 2017-05-10 LAB
ALBUMIN SERPL BCP-MCNC: 3.4 G/DL (ref 3.5–5)
ALBUMIN/GLOB SERPL: 1 {RATIO} (ref 1.1–2.2)
ALP SERPL-CCNC: 103 U/L (ref 45–117)
ALT SERPL-CCNC: 28 U/L (ref 12–78)
ANION GAP BLD CALC-SCNC: 8 MMOL/L (ref 5–15)
AST SERPL W P-5'-P-CCNC: 24 U/L (ref 15–37)
BASOPHILS # BLD AUTO: 0 K/UL (ref 0–0.1)
BASOPHILS # BLD: 1 % (ref 0–1)
BILIRUB DIRECT SERPL-MCNC: 0.1 MG/DL (ref 0–0.2)
BILIRUB SERPL-MCNC: 0.3 MG/DL (ref 0.2–1)
BUN SERPL-MCNC: 27 MG/DL (ref 6–20)
BUN/CREAT SERPL: 25 (ref 12–20)
CALCIUM SERPL-MCNC: 8.9 MG/DL (ref 8.5–10.1)
CHLORIDE SERPL-SCNC: 106 MMOL/L (ref 97–108)
CO2 SERPL-SCNC: 27 MMOL/L (ref 21–32)
CREAT SERPL-MCNC: 1.1 MG/DL (ref 0.7–1.3)
DIFFERENTIAL METHOD BLD: ABNORMAL
EOSINOPHIL # BLD: 0.2 K/UL (ref 0–0.4)
EOSINOPHIL NFR BLD: 5 % (ref 0–7)
ERYTHROCYTE [DISTWIDTH] IN BLOOD BY AUTOMATED COUNT: 15.6 % (ref 11.5–14.5)
GLOBULIN SER CALC-MCNC: 3.5 G/DL (ref 2–4)
GLUCOSE SERPL-MCNC: 89 MG/DL (ref 65–100)
HCT VFR BLD AUTO: 33.5 % (ref 36.6–50.3)
HGB BLD-MCNC: 11.1 G/DL (ref 12.1–17)
LYMPHOCYTES # BLD AUTO: 20 % (ref 12–49)
LYMPHOCYTES # BLD: 0.7 K/UL (ref 0.8–3.5)
MCH RBC QN AUTO: 29.6 PG (ref 26–34)
MCHC RBC AUTO-ENTMCNC: 33.1 G/DL (ref 30–36.5)
MCV RBC AUTO: 89.3 FL (ref 80–99)
MONOCYTES # BLD: 0.4 K/UL (ref 0–1)
MONOCYTES NFR BLD AUTO: 12 % (ref 5–13)
NEUTS SEG # BLD: 2.1 K/UL (ref 1.8–8)
NEUTS SEG NFR BLD AUTO: 62 % (ref 32–75)
PLATELET # BLD AUTO: 161 K/UL (ref 150–400)
POTASSIUM SERPL-SCNC: 4.6 MMOL/L (ref 3.5–5.1)
PROT SERPL-MCNC: 6.9 G/DL (ref 6.4–8.2)
RBC # BLD AUTO: 3.75 M/UL (ref 4.1–5.7)
RBC MORPH BLD: ABNORMAL
SODIUM SERPL-SCNC: 141 MMOL/L (ref 136–145)
WBC # BLD AUTO: 3.4 K/UL (ref 4.1–11.1)

## 2017-05-10 PROCEDURE — 36415 COLL VENOUS BLD VENIPUNCTURE: CPT | Performed by: INTERNAL MEDICINE

## 2017-05-10 PROCEDURE — 85025 COMPLETE CBC W/AUTO DIFF WBC: CPT | Performed by: INTERNAL MEDICINE

## 2017-05-10 PROCEDURE — 80048 BASIC METABOLIC PNL TOTAL CA: CPT | Performed by: INTERNAL MEDICINE

## 2017-05-10 PROCEDURE — 80076 HEPATIC FUNCTION PANEL: CPT | Performed by: INTERNAL MEDICINE

## 2017-05-10 NOTE — PROGRESS NOTES
Blood pressure 109/69, pulse 65, temperature 97.5 °F (36.4 °C), resp. rate 20. Pt arrived at 1301,labs drawn peripherally from left [facility]. Pt tolerated well and left at 1309.

## 2017-05-11 ENCOUNTER — OFFICE VISIT (OUTPATIENT)
Dept: ONCOLOGY | Age: 72
End: 2017-05-11

## 2017-05-11 ENCOUNTER — HOSPITAL ENCOUNTER (OUTPATIENT)
Dept: INFUSION THERAPY | Age: 72
Discharge: HOME | End: 2017-05-11
Payer: MEDICARE

## 2017-05-11 ENCOUNTER — DOCUMENTATION ONLY (OUTPATIENT)
Dept: ONCOLOGY | Age: 72
End: 2017-05-11

## 2017-05-11 VITALS
HEIGHT: 71 IN | BODY MASS INDEX: 30.94 KG/M2 | WEIGHT: 221 LBS | OXYGEN SATURATION: 97 % | TEMPERATURE: 95.9 F | HEART RATE: 63 BPM | RESPIRATION RATE: 18 BRPM | SYSTOLIC BLOOD PRESSURE: 144 MMHG | DIASTOLIC BLOOD PRESSURE: 84 MMHG

## 2017-05-11 VITALS
HEIGHT: 71 IN | SYSTOLIC BLOOD PRESSURE: 124 MMHG | DIASTOLIC BLOOD PRESSURE: 76 MMHG | BODY MASS INDEX: 30.98 KG/M2 | RESPIRATION RATE: 18 BRPM | HEART RATE: 56 BPM | WEIGHT: 221.3 LBS | TEMPERATURE: 96.9 F

## 2017-05-11 DIAGNOSIS — F32.A DEPRESSION, UNSPECIFIED DEPRESSION TYPE: ICD-10-CM

## 2017-05-11 DIAGNOSIS — I26.99 OTHER ACUTE PULMONARY EMBOLISM WITHOUT ACUTE COR PULMONALE (HCC): ICD-10-CM

## 2017-05-11 DIAGNOSIS — C83.18 MANTLE CELL LYMPHOMA OF LYMPH NODES OF MULTIPLE REGIONS (HCC): Primary | ICD-10-CM

## 2017-05-11 DIAGNOSIS — D64.9 ANEMIA, UNSPECIFIED TYPE: ICD-10-CM

## 2017-05-11 DIAGNOSIS — R59.1 LYMPHADENOPATHY: ICD-10-CM

## 2017-05-11 DIAGNOSIS — R16.1 SPLENOMEGALY: ICD-10-CM

## 2017-05-11 PROCEDURE — 96411 CHEMO IV PUSH ADDL DRUG: CPT

## 2017-05-11 PROCEDURE — 74011250636 HC RX REV CODE- 250/636: Performed by: INTERNAL MEDICINE

## 2017-05-11 PROCEDURE — 74011250637 HC RX REV CODE- 250/637: Performed by: INTERNAL MEDICINE

## 2017-05-11 PROCEDURE — 74011000250 HC RX REV CODE- 250

## 2017-05-11 PROCEDURE — 74011250636 HC RX REV CODE- 250/636

## 2017-05-11 PROCEDURE — 74011000258 HC RX REV CODE- 258: Performed by: INTERNAL MEDICINE

## 2017-05-11 PROCEDURE — 96413 CHEMO IV INFUSION 1 HR: CPT

## 2017-05-11 PROCEDURE — 96415 CHEMO IV INFUSION ADDL HR: CPT

## 2017-05-11 PROCEDURE — 77030012965 HC NDL HUBR BBMI -A

## 2017-05-11 PROCEDURE — 96375 TX/PRO/DX INJ NEW DRUG ADDON: CPT

## 2017-05-11 RX ORDER — HEPARIN 100 UNIT/ML
500 SYRINGE INTRAVENOUS AS NEEDED
Status: ACTIVE | OUTPATIENT
Start: 2017-05-11 | End: 2017-05-12

## 2017-05-11 RX ORDER — SODIUM CHLORIDE 0.9 % (FLUSH) 0.9 %
5-10 SYRINGE (ML) INJECTION AS NEEDED
Status: ACTIVE | OUTPATIENT
Start: 2017-05-11 | End: 2017-05-12

## 2017-05-11 RX ORDER — SODIUM CHLORIDE 9 MG/ML
10 INJECTION INTRAMUSCULAR; INTRAVENOUS; SUBCUTANEOUS AS NEEDED
Status: ACTIVE | OUTPATIENT
Start: 2017-05-11 | End: 2017-05-12

## 2017-05-11 RX ADMIN — Medication 10 ML: at 13:50

## 2017-05-11 RX ADMIN — RITUXIMAB 875 MG: 10 INJECTION, SOLUTION INTRAVENOUS at 11:57

## 2017-05-11 RX ADMIN — DEXAMETHASONE SODIUM PHOSPHATE 12 MG: 4 INJECTION, SOLUTION INTRA-ARTICULAR; INTRALESIONAL; INTRAMUSCULAR; INTRAVENOUS; SOFT TISSUE at 11:22

## 2017-05-11 RX ADMIN — SODIUM CHLORIDE 100 ML/HR: 900 INJECTION, SOLUTION INTRAVENOUS at 11:21

## 2017-05-11 RX ADMIN — ACETAMINOPHEN 650 MG: 325 TABLET ORAL at 11:22

## 2017-05-11 RX ADMIN — Medication 10 ML: at 10:08

## 2017-05-11 RX ADMIN — SODIUM CHLORIDE, PRESERVATIVE FREE 500 UNITS: 5 INJECTION INTRAVENOUS at 13:55

## 2017-05-11 RX ADMIN — BENDAMUSTINE HYDROCHLORIDE 210 MG: 25 INJECTION, SOLUTION INTRAVENOUS at 13:38

## 2017-05-11 RX ADMIN — DIPHENHYDRAMINE HYDROCHLORIDE 50 MG: 50 INJECTION INTRAMUSCULAR; INTRAVENOUS at 11:21

## 2017-05-11 RX ADMIN — PALONOSETRON HYDROCHLORIDE 0.25 MG: 0.25 INJECTION INTRAVENOUS at 11:21

## 2017-05-11 RX ADMIN — SODIUM CHLORIDE 10 ML: 9 INJECTION, SOLUTION INTRAMUSCULAR; INTRAVENOUS; SUBCUTANEOUS at 10:05

## 2017-05-11 RX ADMIN — Medication 10 ML: at 10:07

## 2017-05-11 NOTE — PROGRESS NOTES
[id number] [facility] at [address] [place] [address]  [phone]    Hematology / Oncology Follow Up    Reason for Visit:   David Wallace is a 66 y.o. male who is seen for follow up of lymphoma. Treatment History:   · CTA Chest 11/11/2016: PE in left lower lobe pulmonary arteries, extensive adneopathy  · CT A/P 11/12/2016: Cirrhosis, massive splenomegaly, ascites, massive lymphadenopathy  · US guided axillary node biopsy 11/14/2016: CD5+ B-cell non-hodgkin lymphoma, phenotype most consistent with mantle cell lymphoma. FISH with t(11;14)  · PET-CT 11/23/2016: Marked splenomegaly with increased metabolic activity consistent with lymphoma. Bilateral cervical and axillary adenopathy. Mediastinal and right hilar and left diaphragmatic adenopathy. Bilateral pelvic and inguinal adenopathy. Multiple small mesenteric and retroperitoneal nodes with low grade or no activity. · BMBx 11/29/2016: Hypercellular marrow involved by mantle cell lymphoma  · Stage III Mantle Cell Lymphoma  · Palliative chemotherapy with Rituximab and Bendamustine beginning 12/8/2016    History of Present Illness:   Here today for follow up. He reports feeling very well. No complaints today. Energy good. Appetite normal. Reports compliance with Lovenox bid, denies bleeding. He states he is tolerating therapy well. PAST HISTORY: The following sections were reviewed and updated in the EMR as appropriate: PMH, SH, FH, Medications, Allergies. No Known Allergies       Review of Systems: A complete review of systems was obtained, reviewed, and scanned into the EMR. Pertinent findings reviewed above.       Physical Exam:     Visit Vitals    /84 (BP 1 Location: Right arm, BP Patient Position: Sitting)    Pulse 63    Temp 95.9 °F (35.5 °C) (Temporal)    Resp 18    Ht 5' 10.8\" (1.798 m)    Wt 221 lb (100.2 kg)    SpO2 97%    BMI 31 kg/m2     ECOG PS: 1  General: No distress  Eyes: PERRLA, anicteric sclerae  HENT: Atraumatic, OP clear  Neck: Supple  Lymphatic: No cervical, supraclavicular, or inguinal adenopathy  Respiratory: CTAB, normal respiratory effort  CV: Normal rate, regular rhythm, no murmurs. Bilateral LE edema, 1+, compression stockings in place  GI: Soft, nontender, nondistended, no masses, no hepatomegaly, no splenomegaly  MS: Normal gait and station. Digits without clubbing or cyanosis. Skin: No rashes, ecchymoses, or petechiae. Normal temperature, turgor, and texture. Psych: Alert, oriented, appropriate affect, normal judgment/insight    Results:     Lab Results   Component Value Date/Time    WBC 3.4 05/10/2017 01:05 PM    HGB 11.1 05/10/2017 01:05 PM    HCT 33.5 05/10/2017 01:05 PM    PLATELET 841 55/79/0129 01:05 PM    MCV 89.3 05/10/2017 01:05 PM    ABS. NEUTROPHILS 2.1 05/10/2017 01:05 PM    Hemoglobin (POC) 10.9 01/12/2017 08:02 AM    Hematocrit (POC) 32 01/12/2017 08:02 AM     Lab Results   Component Value Date/Time    Sodium 141 05/10/2017 01:05 PM    Potassium 4.6 05/10/2017 01:05 PM    Chloride 106 05/10/2017 01:05 PM    CO2 27 05/10/2017 01:05 PM    Glucose 89 05/10/2017 01:05 PM    BUN 27 05/10/2017 01:05 PM    Creatinine 1.10 05/10/2017 01:05 PM    GFR est AA >60 05/10/2017 01:05 PM    GFR est non-AA >60 05/10/2017 01:05 PM    Calcium 8.9 05/10/2017 01:05 PM    Sodium (POC) 143 01/12/2017 08:02 AM    Potassium (POC) 4.0 01/12/2017 08:02 AM    Chloride (POC) 104 01/12/2017 08:02 AM    Glucose (POC) 156 01/12/2017 08:02 AM    BUN (POC) 23 01/12/2017 08:02 AM    Creatinine (POC) 0.8 01/12/2017 08:02 AM    Calcium, ionized (POC) 1.19 01/12/2017 08:02 AM     Lab Results   Component Value Date/Time    Bilirubin, total 0.3 05/10/2017 01:05 PM    ALT (SGPT) 28 05/10/2017 01:05 PM    AST (SGOT) 24 05/10/2017 01:05 PM    Alk.  phosphatase 103 05/10/2017 01:05 PM    Protein, total 6.9 05/10/2017 01:05 PM    Albumin 3.4 05/10/2017 01:05 PM    Globulin 3.5 05/10/2017 01:05 PM     Lab Results   Component Value Date/Time Reticulocyte count 1.8 11/10/2016 02:00 PM    Iron % saturation 11 11/10/2016 02:00 PM    Iron 26 11/10/2016 02:00 PM    TIBC 240 11/10/2016 02:00 PM    Ferritin 100 11/10/2016 02:00 PM    Vitamin B12 415 11/10/2016 02:00 PM    Folate 15.6 11/10/2016 02:00 PM    Haptoglobin 186 11/11/2016 12:04 PM     11/10/2016 02:00 PM    Beta-2 Microglobulin, serum 4.4 11/15/2016 05:21 AM    TSH 4.06 11/10/2016 11:21 AM    Hep C  virus Ab Interp. NONREACTIVE 08/02/2016 08:54 AM     Lab Results   Component Value Date/Time    INR 1.1 11/11/2016 12:04 PM    aPTT 31.3 11/11/2016 12:04 PM    Fibrinogen 215 11/11/2016 12:04 PM       HepB/C negative  TTE 11/10/2016: EF 60%    Bone marrow biopsy 11/29/2016: Hypercellular marrow involved by mantle cell lymphoma    Assessment:   1) Mantle cell lymphoma  Stage IV  His cancer is not curable and management is with palliative intent. Bone marrow results are positive for marrow involvement, increasing this to stage IV. His MIPI score is 6.24, which is High Risk and cooresponds to a median survival of 37 months and 5-year OS of 20%. He is currently on chemotherapy with R-Bendamustine with plans for 6 cycles, potentially followed by maintenance rituximab. He continues tolerating therapy very well and labs are adequate for treatment today. We will proceed with last cycle of therapy today. We discussed options moving forward including referral to [facility] for consideration of stem cell transplant, maintenance rituximab or surveillance alone. We will get repeat PET-CT and bone marrow biopsy in about a month and see him back to discuss results. My recommendation is for referral to [facility] to see if he is candidate for transplant but this will depend on response to therapy. 2) Depression  Stable. Off lexapro. 3) Cirrhosis  Unclear if this is related to the lymphoma. GI following. 4) DVT, PE  Diagnosed 11/11/2016. Malignancy associated. Continue lovenox bid.      5) Anemia  Stable. Secondary to #1, and perhaps iron deficiency. He stopped the iron supplements due to nausea. Monitor. 6) Thrombocytopenia  Resolved. Secondary to splenomegaly. Monitor. 7) Neutropenia  Resolved with holding therapy. Monitor. Plan:     · Proceed today with C#6 R-Bendamustine  · Labs prior to each treatment: CBC with diff, CMP on day 1  · Antiemetic prophylaxis: Ondansetron and Dexamethasone on day 1 and 2  · Infusion reaction prophylaxis: acetaminophen and diphenhydramine prior to Rituximab  · PRN antiemetics at home: Ondansetron, Prochlorparazine  · Neulasta 24-72 hours after each cycle  · Continue lovenox 1mg/kig BID  · PET-CT and bone marrow biopsy in about 4 weeks  · Port flush every 4-6 weeks  · Return to clinic in 4 weeks to discuss results and continued plan of care.       Signed By: Rhonda Milton MD     May 11, 2017

## 2017-05-11 NOTE — MR AVS SNAPSHOT
Visit Information Date & Time Provider Department Dept. Phone Encounter #  
 5/11/2017 10:45 AM Stormy Kowalski NP 41 [facility] at [address] [place] [address] [id number] Follow-up Instructions Return in about 4 weeks (around 6/8/2017) for roscoe Shin, port flush. Upcoming Health Maintenance Date Due DTaP/Tdap/Td series (1 - Tdap) 7/5/1964 ZOSTER VACCINE AGE 60> 7/5/2003 GLAUCOMA SCREENING Q2Y 7/5/2008 Pneumococcal 65+ High/Highest Risk (1 of 2 - PCV13) 7/5/2008 MEDICARE YEARLY EXAM 7/5/2008 INFLUENZA AGE 9 TO ADULT 8/1/2017 FOBT Q 1 YEAR AGE 50-75 11/11/2017 Allergies as of 5/11/2017  Review Complete On: 5/11/2017 By: Edna Renee LPN No Known Allergies Current Immunizations  Reviewed on 4/14/2017 No immunizations on file. Not reviewed this visit You Were Diagnosed With   
  
 Codes Comments Mantle cell lymphoma of lymph nodes of multiple regions [facility])    -  Primary ICD-10-CM: C83.18 
ICD-9-CM: 200.48 Vitals BP Pulse Temp Resp Height(growth percentile) 144/84 (BP 1 Location: Right arm, BP Patient Position: Sitting) 63 95.9 °F (35.5 °C) (Temporal) 18 5' 10.8\" (1.798 m) Weight(growth percentile) SpO2 BMI Smoking Status 221 lb (100.2 kg) 97% 31 kg/m2 Former Smoker BMI and BSA Data Body Mass Index Body Surface Area  
 31 kg/m 2 2.24 m 2 Preferred Pharmacy Pharmacy Name Phone [facility], [address] [phone] Your Updated Medication List  
  
   
This list is accurate as of: 5/11/17 11:14 AM.  Always use your most recent med list.  
  
  
  
  
 enoxaparin 120 mg/0.8 mL injection Commonly known as:  LOVENOX INJECT ONE SYRINGEFUL (.8 ML / 120 MG) UNDER THE SKIN EVERY 12 HORUS  
  
 ferrous sulfate 325 mg (65 mg iron) tablet Take 1 Tab by mouth two (2) times daily (with meals). lidocaine-prilocaine topical cream  
Commonly known as:  EMLA Apply  to affected area as needed for Pain (Apply 30-60 min. prior to having your port accessed). ondansetron hcl 8 mg tablet Commonly known as:  [place] Dupes Take 1 Tab by mouth every eight (8) hours as needed for Nausea. prochlorperazine 10 mg tablet Commonly known as:  COMPAZINE Take 1 Tab by mouth every six (6) hours as needed for Nausea. Follow-up Instructions Return in about 4 weeks (around 6/8/2017) for Moises/Minnie, labs, port flush. To-Do List   
 05/12/2017 10:00 AM  
  Appointment with [address] at [place] [address] ([phone]) Around 06/06/2017 Imaging:  CT BX BONE MARROW NDL/TROCAR   
  
 06/06/2017 Imaging:  PET/CT TUMOR IMAGE SKULL THIGH (SUB)   
  
 06/08/2017 2:00 PM  
  Appointment with [address] at [place] [address] ([phone]) Introducing [facility] SERVICES! Dear Sanjuana Wells: Thank you for requesting a [employer] account. Our records indicate that you already have an active [employer] account. You can access your account anytime at [email or web address] Did you know that you can access your hospital and ER discharge instructions at any time in [employer]? You can also review all of your test results from your hospital stay or ER visit. Additional Information If you have questions, please visit the Frequently Asked Questions section of the [employer] website at [email or web address] Remember, [employer] is NOT to be used for urgent needs. For medical emergencies, dial 911. Now available from your iPhone and Android! Please provide this summary of care documentation to your next provider. Your primary care clinician is listed as Mariel Bhandari. If you have any questions after today's visit, please call [phone].

## 2017-05-11 NOTE — PROGRESS NOTES
Outpatient Infusion Center - Chemotherapy Progress Note    1000 Pt admit to [facility] for C6D1 Bendamustine + Rituxan ambulatory in stable condition. Assessment completed. No new concerns voiced. PAC with positive blood return and flush. Labs reviewed from 5/10/17. Chemotherapy Flowsheet 5/11/2017   Cycle C6D1   Date 5/11/2017   Drug / Regimen Bendamustine/Rituximab   Notes -       Patient Vitals for the past 12 hrs:   Temp Pulse Resp BP   05/11/17 1330 96.9 °F (36.1 °C) (!) 56 18 124/76   05/11/17 1300 96.9 °F (36.1 °C) (!) 53 18 115/71   05/11/17 1230 97.8 °F (36.6 °C) (!) 55 18 107/69   05/11/17 1005 97 °F (36.1 °C) 62 18 124/77       Medications:  NS IV  Aloxi IVP  Tylneol PO  Benadryl IVP  Dexamethasone IVP  Rituximab IV Rapid  Bendamustine iV    1405 Pt tolerated treatment well. PAC maintained positive blood return throughout treatment, flushed with positive blood return at conclusion and de accessed. Bandaid applied. D/c home ambulatory in no distress.  Pt aware of next appointment scheduled for 5/12/17 at 1000

## 2017-05-11 NOTE — PROGRESS NOTES
[employer]  Financial Navigator Encounter    Patient Name: Shayla Barajas    Narrative: Met with patient to discuss \"Understanding 11 [facility] Benefits\" form. Provided patient with \"Medicare 2017 Amounts\" packet. Encouraged patient to contact me should any financial barriers to care arise. Plan: Ongoing support as needed.

## 2017-05-12 ENCOUNTER — HOSPITAL ENCOUNTER (OUTPATIENT)
Dept: INFUSION THERAPY | Age: 72
Discharge: HOME | End: 2017-05-12
Payer: MEDICARE

## 2017-05-12 VITALS
HEART RATE: 56 BPM | OXYGEN SATURATION: 100 % | TEMPERATURE: 97.6 F | BODY MASS INDEX: 31.68 KG/M2 | DIASTOLIC BLOOD PRESSURE: 75 MMHG | WEIGHT: 226.3 LBS | RESPIRATION RATE: 18 BRPM | SYSTOLIC BLOOD PRESSURE: 130 MMHG | HEIGHT: 71 IN

## 2017-05-12 PROCEDURE — 96375 TX/PRO/DX INJ NEW DRUG ADDON: CPT

## 2017-05-12 PROCEDURE — 74011000258 HC RX REV CODE- 258: Performed by: INTERNAL MEDICINE

## 2017-05-12 PROCEDURE — 74011250636 HC RX REV CODE- 250/636: Performed by: INTERNAL MEDICINE

## 2017-05-12 PROCEDURE — 74011250636 HC RX REV CODE- 250/636

## 2017-05-12 PROCEDURE — 96409 CHEMO IV PUSH SNGL DRUG: CPT

## 2017-05-12 PROCEDURE — 77030012965 HC NDL HUBR BBMI -A

## 2017-05-12 PROCEDURE — 74011000250 HC RX REV CODE- 250

## 2017-05-12 RX ORDER — SODIUM CHLORIDE 9 MG/ML
10 INJECTION INTRAMUSCULAR; INTRAVENOUS; SUBCUTANEOUS AS NEEDED
Status: DISPENSED | OUTPATIENT
Start: 2017-05-12 | End: 2017-05-13

## 2017-05-12 RX ORDER — SODIUM CHLORIDE 0.9 % (FLUSH) 0.9 %
10-40 SYRINGE (ML) INJECTION AS NEEDED
Status: ACTIVE | OUTPATIENT
Start: 2017-05-12 | End: 2017-05-13

## 2017-05-12 RX ORDER — HEPARIN 100 UNIT/ML
500 SYRINGE INTRAVENOUS AS NEEDED
Status: ACTIVE | OUTPATIENT
Start: 2017-05-12 | End: 2017-05-13

## 2017-05-12 RX ADMIN — HEPARIN SODIUM (PORCINE) LOCK FLUSH IV SOLN 100 UNIT/ML 500 UNITS: 100 SOLUTION at 11:29

## 2017-05-12 RX ADMIN — BENDAMUSTINE HYDROCHLORIDE 210 MG: 25 INJECTION, SOLUTION INTRAVENOUS at 11:12

## 2017-05-12 RX ADMIN — DEXAMETHASONE SODIUM PHOSPHATE 12 MG: 4 INJECTION, SOLUTION INTRA-ARTICULAR; INTRALESIONAL; INTRAMUSCULAR; INTRAVENOUS; SOFT TISSUE at 10:30

## 2017-05-12 RX ADMIN — SODIUM CHLORIDE 10 ML: 9 INJECTION INTRAMUSCULAR; INTRAVENOUS; SUBCUTANEOUS at 10:20

## 2017-05-12 RX ADMIN — Medication 10 ML: at 11:29

## 2017-05-12 RX ADMIN — SODIUM CHLORIDE 100 ML/HR: 900 INJECTION, SOLUTION INTRAVENOUS at 10:22

## 2017-05-12 NOTE — PROGRESS NOTES
Outpatient Infusion Center - Chemotherapy Progress Note    1010  Pt admit to [facility] for Cycle 6 Day 2 Rituxan/Bendamustine ambulatory in stable condition. Assessment completed. No new concerns voiced. Right chest port accessed with 20 gauge 0.75 inch guallpa needle with positive blood return. Labs reviewed from 5/10/17, labs within treatment parameters. Visit Vitals    /83    Pulse (!) 59    Temp 96.8 °F (36 °C)    Resp 18    Ht 5' 11\" (1.803 m)    Wt 102.6 kg (226 lb 4.8 oz)    SpO2 100%    BMI 31.56 kg/m2       Medications:  NS  Decadron IV  Bendamustine IV    Blood pressure 130/75, pulse (!) 56, temperature 97.6 °F (36.4 °C), resp. rate 18, height 5' 11\" (1.803 m), weight 102.6 kg (226 lb 4.8 oz), SpO2 100 %. 1135  Pt tolerated treatment well. Positive blood return pre and post infusion. Port flushed and deaccessed per [facility]. D/c home ambulatory in no distress.  Pt aware of next [facility] appointment scheduled for 6/8/17 at 2 pm.

## 2017-05-12 NOTE — PROGRESS NOTES
Problem: Chemotherapy Treatment  Goal: *Chemotherapy regimen followed  Outcome: Progressing Towards Goal  Pt received Cycle 6 Day 2 Rituxan/Bendamustine as ordered, tolerated well.

## 2017-05-23 ENCOUNTER — HOSPITAL ENCOUNTER (OUTPATIENT)
Dept: CT IMAGING | Age: 72
Discharge: HOME | End: 2017-05-23
Attending: NURSE PRACTITIONER
Payer: MEDICARE

## 2017-05-23 VITALS
DIASTOLIC BLOOD PRESSURE: 74 MMHG | RESPIRATION RATE: 17 BRPM | OXYGEN SATURATION: 98 % | SYSTOLIC BLOOD PRESSURE: 163 MMHG | HEART RATE: 61 BPM

## 2017-05-23 DIAGNOSIS — C83.18 MANTLE CELL LYMPHOMA OF LYMPH NODES OF MULTIPLE REGIONS (HCC): ICD-10-CM

## 2017-05-23 LAB
BASOPHILS # BLD AUTO: 0 K/UL (ref 0–0.1)
BASOPHILS # BLD: 0 % (ref 0–1)
EOSINOPHIL # BLD: 0.2 K/UL (ref 0–0.4)
EOSINOPHIL NFR BLD: 9 % (ref 0–7)
ERYTHROCYTE [DISTWIDTH] IN BLOOD BY AUTOMATED COUNT: 15.3 % (ref 11.5–14.5)
HCT VFR BLD AUTO: 34.7 % (ref 36.6–50.3)
HGB BLD-MCNC: 11.3 G/DL (ref 12.1–17)
LYMPHOCYTES # BLD AUTO: 13 % (ref 12–49)
LYMPHOCYTES # BLD: 0.3 K/UL (ref 0.8–3.5)
MCH RBC QN AUTO: 29 PG (ref 26–34)
MCHC RBC AUTO-ENTMCNC: 32.6 G/DL (ref 30–36.5)
MCV RBC AUTO: 89 FL (ref 80–99)
MONOCYTES # BLD: 0.4 K/UL (ref 0–1)
MONOCYTES NFR BLD AUTO: 15 % (ref 5–13)
NEUTS SEG # BLD: 1.7 K/UL (ref 1.8–8)
NEUTS SEG NFR BLD AUTO: 63 % (ref 32–75)
PLATELET # BLD AUTO: 132 K/UL (ref 150–400)
RBC # BLD AUTO: 3.9 M/UL (ref 4.1–5.7)
RBC MORPH BLD: ABNORMAL
WBC # BLD AUTO: 2.6 K/UL (ref 4.1–11.1)

## 2017-05-23 PROCEDURE — 99152 MOD SED SAME PHYS/QHP 5/>YRS: CPT

## 2017-05-23 PROCEDURE — 88342 IMHCHEM/IMCYTCHM 1ST ANTB: CPT | Performed by: NURSE PRACTITIONER

## 2017-05-23 PROCEDURE — 88311 DECALCIFY TISSUE: CPT | Performed by: NURSE PRACTITIONER

## 2017-05-23 PROCEDURE — 36415 COLL VENOUS BLD VENIPUNCTURE: CPT | Performed by: RADIOLOGY

## 2017-05-23 PROCEDURE — 88237 TISSUE CULTURE BONE MARROW: CPT | Performed by: NURSE PRACTITIONER

## 2017-05-23 PROCEDURE — 88185 FLOWCYTOMETRY/TC ADD-ON: CPT | Performed by: NURSE PRACTITIONER

## 2017-05-23 PROCEDURE — 88341 IMHCHEM/IMCYTCHM EA ADD ANTB: CPT | Performed by: NURSE PRACTITIONER

## 2017-05-23 PROCEDURE — 77030028872 HC BN BIOP NDL ON CNTRL TY TELE -C

## 2017-05-23 PROCEDURE — 88305 TISSUE EXAM BY PATHOLOGIST: CPT | Performed by: NURSE PRACTITIONER

## 2017-05-23 PROCEDURE — 74011250636 HC RX REV CODE- 250/636

## 2017-05-23 PROCEDURE — 85025 COMPLETE CBC W/AUTO DIFF WBC: CPT | Performed by: RADIOLOGY

## 2017-05-23 PROCEDURE — 88365 INSITU HYBRIDIZATION (FISH): CPT | Performed by: NURSE PRACTITIONER

## 2017-05-23 PROCEDURE — 85097 BONE MARROW INTERPRETATION: CPT | Performed by: NURSE PRACTITIONER

## 2017-05-23 PROCEDURE — 38221 DX BONE MARROW BIOPSIES: CPT

## 2017-05-23 PROCEDURE — 88264 CHROMOSOME ANALYSIS 20-25: CPT | Performed by: NURSE PRACTITIONER

## 2017-05-23 PROCEDURE — 77030003560 HC NDL HUBR BARD -A

## 2017-05-23 PROCEDURE — 88184 FLOWCYTOMETRY/ TC 1 MARKER: CPT | Performed by: NURSE PRACTITIONER

## 2017-05-23 PROCEDURE — 88313 SPECIAL STAINS GROUP 2: CPT | Performed by: NURSE PRACTITIONER

## 2017-05-23 PROCEDURE — 74011250636 HC RX REV CODE- 250/636: Performed by: RADIOLOGY

## 2017-05-23 RX ORDER — HEPARIN 100 UNIT/ML
500 SYRINGE INTRAVENOUS AS NEEDED
Status: DISCONTINUED | OUTPATIENT
Start: 2017-05-23 | End: 2017-05-27 | Stop reason: HOSPADM

## 2017-05-23 RX ORDER — FENTANYL CITRATE 50 UG/ML
100 INJECTION, SOLUTION INTRAMUSCULAR; INTRAVENOUS
Status: DISCONTINUED | OUTPATIENT
Start: 2017-05-23 | End: 2017-05-23

## 2017-05-23 RX ORDER — MIDAZOLAM HYDROCHLORIDE 1 MG/ML
5 INJECTION, SOLUTION INTRAMUSCULAR; INTRAVENOUS
Status: DISCONTINUED | OUTPATIENT
Start: 2017-05-23 | End: 2017-05-23

## 2017-05-23 RX ORDER — HEPARIN 100 UNIT/ML
SYRINGE INTRAVENOUS
Status: COMPLETED
Start: 2017-05-23 | End: 2017-05-23

## 2017-05-23 RX ADMIN — MIDAZOLAM HYDROCHLORIDE 1 MG: 1 INJECTION, SOLUTION INTRAMUSCULAR; INTRAVENOUS at 10:22

## 2017-05-23 RX ADMIN — MIDAZOLAM HYDROCHLORIDE 1 MG: 1 INJECTION, SOLUTION INTRAMUSCULAR; INTRAVENOUS at 10:32

## 2017-05-23 RX ADMIN — HEPARIN 300 UNITS: 100 SYRINGE at 11:37

## 2017-05-23 RX ADMIN — FENTANYL CITRATE 25 MCG: 50 INJECTION, SOLUTION INTRAMUSCULAR; INTRAVENOUS at 10:32

## 2017-05-23 RX ADMIN — FENTANYL CITRATE 25 MCG: 50 INJECTION, SOLUTION INTRAMUSCULAR; INTRAVENOUS at 10:22

## 2017-05-23 RX ADMIN — FENTANYL CITRATE 25 MCG: 50 INJECTION, SOLUTION INTRAMUSCULAR; INTRAVENOUS at 10:27

## 2017-05-23 RX ADMIN — MIDAZOLAM HYDROCHLORIDE 1 MG: 1 INJECTION, SOLUTION INTRAMUSCULAR; INTRAVENOUS at 10:27

## 2017-05-23 RX ADMIN — SODIUM CHLORIDE, PRESERVATIVE FREE 300 UNITS: 5 INJECTION INTRAVENOUS at 11:37

## 2017-05-23 NOTE — PROGRESS NOTES
Patient to [facility] for bone marrow biopsy, ambulatory with steady gait, daughter Syd London to wait for patient and transport home. Patient changed into gown, ID verified, no allergies, fall band applied. Patient has port a cath that was accesses without difficulty under sterile technique using 1.0 power port needle, positive blood return, labs sent. Vitals taken, consent obtained. Patient lungs clear upon auscultation but patient noted to be tachypenic with shallow belly breathing. Patient stopped lovenox 5 days ago and has history of PE's. MD called for consent. Patient to CT at 1015  Time out [address]  End 1038, sample collected by Dr. Selena Anderson, patient received 75mcg Fentanyl and 3mg Versed, tolerated procedure well  Procedure site dressed in bulky dry dressing that is CDI. 1048 returned to rad holding, placed on monitor x3. Attempted to contact family with status update but they were not available. Message left with [place] in registration. 1106 Patient sitting upright, alert and oriented, drinking ginger ale. 1128 patient port de accessed using heparin flush 3ml and covered with dry dressing    Discharge teaching provided to include s/sx of infection, post sedation concerns. Patient changed into personal clothing and taken via wheelchair to awaiting transportation home.

## 2017-05-23 NOTE — H&P
Radiology History and Physical    Patient: Tico Huddleston 66 y.o. male       Chief Complaint: No chief complaint on file. History of Present Illness: lymphoma    History:    Past Medical History:   Diagnosis Date    Arthritis     knees    Ill-defined condition 12/2015    episode of hypertension pcp felt it was related to lost of spouse no further episodes noted    Undulant fever     as a child     Family History   Problem Relation Age of Onset    Coronary Artery Disease Father     Heart Disease Father     No Known Problems Brother     Cancer Sister      Social History     Social History    Marital status:      Spouse name: N/A    Number of children: N/A    Years of education: N/A     Occupational History    Not on file. Social History Main Topics    Smoking status: Former Smoker     Packs/day: 0.10     Years: 4.00     Quit date: 7/29/1972    Smokeless tobacco: Never Used    Alcohol use 1.2 oz/week     2 Cans of beer per week    Drug use: No    Sexual activity: No     Other Topics Concern    Not on file     Social History Narrative       Allergies: No Known Allergies    Current Medications:  Current Outpatient Prescriptions   Medication Sig    enoxaparin (LOVENOX) 120 mg/0.8 mL injection INJECT ONE SYRINGEFUL (.8 ML / 120 MG) UNDER THE SKIN EVERY 12 HORUS    prochlorperazine (COMPAZINE) 10 mg tablet Take 1 Tab by mouth every six (6) hours as needed for Nausea.  lidocaine-prilocaine (EMLA) topical cream Apply  to affected area as needed for Pain (Apply 30-60 min. prior to having your port accessed).  ondansetron hcl (ZOFRAN) 8 mg tablet Take 1 Tab by mouth every eight (8) hours as needed for Nausea.  ferrous sulfate 325 mg (65 mg iron) tablet Take 1 Tab by mouth two (2) times daily (with meals).      Current Facility-Administered Medications   Medication Dose Route Frequency    heparin (porcine) pf 500 Units  500 Units InterCATHeter PRN        Physical Exam:  Blood pressure 163/74, pulse 61, resp. rate 17, SpO2 98 %. GENERAL: alert, cooperative, no distress, appears stated age, LUNG: clear to auscultation bilaterally, HEART: regular rate and rhythm, S1, S2 normal, no murmur, click, rub or gallop      Alerts:    Hospital Problems  Date Reviewed: 5/11/2017    None          Laboratory:      Recent Labs      05/23/17   0913   HGB  11.3*   HCT  34.7*   WBC  2.6*   PLT  132*         Plan of Care/Planned Procedure:  Risks, benefits, and alternatives reviewed with patient and he agrees to proceed with the procedure.        Marleni Pichardo MD

## 2017-05-23 NOTE — IP AVS SNAPSHOT
Colin Eng 
 
 
 [address] 
[phone] Patient: Abdon Chung 
MRN: [id number] VYP:7/4/0496 You are allergic to the following No active allergies Recent Documentation Smoking Status Former Smoker Emergency Contacts Name Discharge Info Relation Home Work Mobile [facility] DISCHARGE CAREGIVER [3] Daughter [21] [phone] Alla Pollack  Child [2] [phone] About your hospitalization You were admitted on:  May 23, 2017 You last received care in the:  [facility] RAD CT You were discharged on:  May 23, 2017 Unit phone number:  [phone] Why you were hospitalized Your primary diagnosis was:  Not on File Providers Seen During Your Hospitalizations Provider Role Specialty Primary office phone Morris Murphy NP Attending Provider Hematology and Oncology [phone] Your Primary Care Physician (PCP) Primary Care Physician Office Phone Office Fax Black Tolbert [phone] Follow-up Information None Your Appointments Thursday June 08, 2017  1:15 PM EDT  
ESTABLISHED PATIENT with Morris Murphy NP  
[facility] [address]) [address] [place] [address], [address] [place] [address]  
[phone] Thursday June 08, 2017  2:00 PM EDT INFUSION 30 with [place] INFUSION NURSE 3  
[address] ([address]) [address] [place] [address] 70 [facility], [facility] 99 [id number]  
[phone] Wednesday June 21, 2017  9:00 AM EDT  
LI PET/CT TMR IMG SKULL THIGH with [facility] PET 1  
[facility] RAD PET ([address]) [address]  
[phone] 1. Patient should arrive 30 minutes early to register.   Patient's entire visit to the hospital will last about 2 Hours. 2.  Eat a low carb/high protein diet the evening before your procedure. Stay away from food and drink that contains sugars the night before and ESPECIALLY the day of the test. 3.  Do Not eat 4 hours prior to your procedure. The patient may drink all the water they want, up until the time of their appointment. Encourage patient to be well hydrated. Coffee is ok, as long as an artificial sweetener is used instead of sugar. 4. Take meds with water or saltine crackers if food required. 5.  Do not exercise the morning of your procedure. 6.  If you take insulin, it must be taken at least 4 hours before your procedure. 7.  You should bring medications for pain, anxiety, or claustrophobia if you need them. If you take medications for anxiety or claustrophobia, a ride needs to come with you. 8.  Materials for this procedure are ordered in advance and are time-sensitive. If you need to cancel or reschedule, you must call [phone] at least 48 hours prior to your appointment time. 9.  Bring recent CT scan results from other facilities with you. Please have patients report to:  [facility]: ER Registration [facility]: [address] [place] [address], Radiation/Oncology Department (left of the fireplace) [facility]: OP Registration MOB 1. Please have the patient report to the [facility]'s Information desk 30 minutes prior to appointment time. The [address] [place] [address] is the first building on the right from the hospital's main entrance. Current Discharge Medication List  
  
ASK your doctor about these medications Dose & Instructions Dispensing Information Comments Morning Noon Evening Bedtime  
 enoxaparin 120 mg/0.8 mL injection Commonly known as:  LOVENOX Your last dose was: Your next dose is: INJECT ONE SYRINGEFUL (.8 ML / 120 MG) UNDER THE SKIN EVERY 12 HORUS Quantity:  60 Syringe Refills:  3 ferrous sulfate 325 mg (65 mg iron) tablet Your last dose was: Your next dose is:    
   
   
 Dose:  325 mg Take 1 Tab by mouth two (2) times daily (with meals). Quantity:  60 Tab Refills:  3  
     
   
   
   
  
 lidocaine-prilocaine topical cream  
Commonly known as:  EMLA Your last dose was: Your next dose is:    
   
   
 Apply  to affected area as needed for Pain (Apply 30-60 min. prior to having your port accessed). Quantity:  30 g Refills:  0  
     
   
   
   
  
 ondansetron hcl 8 mg tablet Commonly known as:  Ellen Davidson Your last dose was: Your next dose is:    
   
   
 Dose:  8 mg Take 1 Tab by mouth every eight (8) hours as needed for Nausea. Quantity:  45 Tab Refills:  5  
     
   
   
   
  
 prochlorperazine 10 mg tablet Commonly known as:  COMPAZINE Your last dose was: Your next dose is:    
   
   
 Dose:  10 mg Take 1 Tab by mouth every six (6) hours as needed for Nausea. Quantity:  50 Tab Refills:  5 Discharge Instructions Thank you for allowing us to care for your today. If you should have any questions about your procedure or follow up care, please contact a member of your radiology team at [phone] or if after hours, please call [phone]. Today you received sedation for your procedure, those medications were: 
Versed 3mg Fentanyl 75mcg Sedation for a Medical Procedure: Care Instructions Your Care Instructions For a minor procedure or surgery, you will get a sedative to help you relax. This drug will make you sleepy. It is usually given in a vein (by IV). A shot may also be used to numb the area. If you had local anesthesia, you may feel some pain and discomfort as it wears off. If you have pain, don't be afraid to say so. Pain medicine works better if you take it before the pain gets bad. Common side effects from sedation include: · Feeling sleepy. (Your doctors and nurses will make sure you are not too sleepy to go home.) · Nausea and vomiting. This usually does not last long. · Feeling tired. Follow-up care is a key part of your treatment and safety. Be sure to make and go to all appointments, and call your doctor if you are having problems. It's also a good idea to know your test results and keep a list of the medicines you take. How can you care for yourself at home? Activity · Don't do anything for 24 hours that requires attention to detail. It takes time for the medicine effects to completely wear off. · For your safety, you should not drive or operate any machinery that could be dangerous until the medicine wears off and you can think clearly and react easily. · Rest when you feel tired. Getting enough sleep will help you recover. Diet · You can eat your normal diet, unless your doctor gives you other instructions. If your stomach is upset, try clear liquids and bland, low-fat foods like plain toast or rice. · Drink plenty of fluids (unless your doctor tells you not to). · Don't drink alcohol for 24 hours. Medicines · Be safe with medicines. Read and follow all instructions on the label. ¨ If the doctor gave you a prescription medicine for pain, take it as prescribed. ¨ If you are not taking a prescription pain medicine, ask your doctor if you can take an over-the-counter medicine. · If you think your pain medicine is making you sick to your stomach: 
¨ Take your medicine after meals (unless your doctor has told you not to). ¨ Ask your doctor for a different pain medicine. When should you call for help? Call 911 anytime you think you may need emergency care. For example, call if: 
· You have severe trouble breathing. · You passed out (lost consciousness). Call your doctor now or seek immediate medical care if: 
· You have trouble breathing. · You have ongoing or worsening nausea or vomiting. · You have a fever. · You have a new or worse headache. · The medicine is not wearing off and you can't think clearly. Watch closely for changes in your health, and be sure to contact your doctor if: 
· You do not get better as expected. Where can you learn more? Go to http://luis alfredo-chuck.info/. Enter R484 in the search box to learn more about \"Sedation for a Medical Procedure: Care Instructions. \" Current as of: August 14, 2016 Content Version: 11.2 © 1389-8439 [employer]. Care instructions adapted under license by [employer] (which disclaims liability or warranty for this information). If you have questions about a medical condition or this instruction, always ask your healthcare professional. [address] any warranty or liability for your use of this information. Bone Marrow Aspiration and Biopsy: What to Expect at [facility] Your Recovery The biopsy site may feel sore for several days. It can help to walk, take pain medicine, and put ice packs on the site. You will probably be able to return to work and your usual activities the day after the procedure. Your doctor or nurse will call you with the results of your test. 
This care sheet gives you a general idea about how long it will take for you to recover. But each person recovers at a different pace. Follow the steps below to get better as quickly as possible. How can you care for yourself at home? Activity · Rest when you feel tired. Getting enough sleep will help you recover. · You may drive when you are no longer taking pain pills and can quickly move your foot from the gas pedal to the brake. You must also be able to sit comfortably for a long period of time, even if you do not plan to go far. You might get caught in traffic. · Most people are able to return to work the day after the procedure. Medicines · Your doctor will tell you if and when you can restart your medicines.  He or she will also give you instructions about taking any new medicines. · If you take blood thinners, such as warfarin (Coumadin), clopidogrel (Plavix), or aspirin, be sure to talk to your doctor. He or she will tell you if and when to start taking those medicines again. Make sure that you understand exactly what your doctor wants you to do. · Be safe with medicines. Take pain medicines exactly as directed. ¨ If the doctor gave you a prescription medicine for pain, take it as prescribed. ¨ If you are not taking a prescription pain medicine, take an over-the-counter medicine such as acetaminophen (Tylenol), ibuprofen (Advil, Motrin), or naproxen (Aleve). Read and follow all instructions on the label. ¨ Do not take two or more pain medicines at the same time unless the doctor told you to. Many pain medicines have acetaminophen, which is Tylenol. Too much acetaminophen (Tylenol) can be harmful. · If you think your pain medicine is making you sick to your stomach: 
¨ Take your medicine after meals (unless your doctor has told you not to). ¨ Ask your doctor for a different pain medicine. · If your doctor prescribed antibiotics, take them as directed. Do not stop taking them just because you feel better. Ice · Put ice or a cold pack on the biopsy site for 10 to 20 minutes at a time. Put a thin cloth between the ice and your skin. Follow-up care is a key part of your treatment and safety. Be sure to make and go to all appointments, and call your doctor if you are having problems. It's also a good idea to know your test results and keep a list of the medicines you take. When should you call for help? Call 911 anytime you think you may need emergency care. For example, call if: 
· You passed out (lost consciousness). Call your doctor now or seek immediate medical care if: 
· You have signs of infection, such as: 
¨ Increased pain, swelling, warmth, or redness. ¨ Red streaks leading from the biopsy site. ¨ Pus draining from the biopsy site. ¨ Swollen lymph nodes in your neck, armpits, or groin. ¨ A fever. Watch closely for any changes in your health, and be sure to contact your doctor if: 
· You are not getting better as expected. Where can you learn more? Go to http://luis alfredo-chuck.info/. Enter E148 in the search box to learn more about \"Bone Marrow Aspiration and Biopsy: What to Expect at Home. \" Current as of: October 14, 2016 Content Version: 11.2 © 5951-0768 [employer]. Care instructions adapted under license by [employer] (which disclaims liability or warranty for this information). If you have questions about a medical condition or this instruction, always ask your healthcare professional. [address] any warranty or liability for your use of this information. Discharge Orders None [facility]! Dear Jamilah Vernon: Thank you for requesting a [employer] account. Our records indicate that you already have an active [employer] account. You can access your account anytime at [email or web address] Did you know that you can access your hospital and ER discharge instructions at any time in [employer]? You can also review all of your test results from your hospital stay or ER visit. Additional Information If you have questions, please visit the Frequently Asked Questions section of the [employer] website at [email or web address] Remember, [employer] is NOT to be used for urgent needs. For medical emergencies, dial 911. Now available from your iPhone and Android! General Information Please provide this summary of care documentation to your next provider. Patient Signature:  ____________________________________________________________ Date:  ____________________________________________________________  
  
Valery Currie  Provider Signature: ____________________________________________________________ Date:  ____________________________________________________________

## 2017-05-23 NOTE — H&P
Radiology History and Physical    Patient: Blayne Gil 66 y.o. male       Chief Complaint: No chief complaint on file. History of Present Illness: mantle cell lymphoma    History:    Past Medical History:   Diagnosis Date    Arthritis     knees    Ill-defined condition 12/2015    episode of hypertension pcp felt it was related to lost of spouse no further episodes noted    Undulant fever     as a child     Family History   Problem Relation Age of Onset    Coronary Artery Disease Father     Heart Disease Father     No Known Problems Brother     Cancer Sister      Social History     Social History    Marital status:      Spouse name: N/A    Number of children: N/A    Years of education: N/A     Occupational History    Not on file. Social History Main Topics    Smoking status: Former Smoker     Packs/day: 0.10     Years: 4.00     Quit date: 7/29/1972    Smokeless tobacco: Never Used    Alcohol use 1.2 oz/week     2 Cans of beer per week    Drug use: No    Sexual activity: No     Other Topics Concern    Not on file     Social History Narrative       Allergies: No Known Allergies    Current Medications:  Current Outpatient Prescriptions   Medication Sig    enoxaparin (LOVENOX) 120 mg/0.8 mL injection INJECT ONE SYRINGEFUL (.8 ML / 120 MG) UNDER THE SKIN EVERY 12 HORUS    prochlorperazine (COMPAZINE) 10 mg tablet Take 1 Tab by mouth every six (6) hours as needed for Nausea.  lidocaine-prilocaine (EMLA) topical cream Apply  to affected area as needed for Pain (Apply 30-60 min. prior to having your port accessed).  ondansetron hcl (ZOFRAN) 8 mg tablet Take 1 Tab by mouth every eight (8) hours as needed for Nausea.  ferrous sulfate 325 mg (65 mg iron) tablet Take 1 Tab by mouth two (2) times daily (with meals).      Current Facility-Administered Medications   Medication Dose Route Frequency    midazolam (VERSED) injection 5 mg  5 mg IntraVENous RAD PRN    fentaNYL citrate (PF) injection 100 mcg  100 mcg IntraVENous RAD PRN    sodium bicarbonate (NEUT) injection 2 mL  2 mL SubCUTAneous ONCE        Physical Exam:  Blood pressure 145/81, pulse (!) 57, resp. rate 18, SpO2 97 %. GENERAL: alert, cooperative, no distress, appears stated age, LUNG: clear to auscultation bilaterally, HEART: regular rate and rhythm, S1, S2 normal, no murmur, click, rub or gallop      Alerts:    Hospital Problems  Date Reviewed: 5/11/2017    None          Laboratory:      Recent Labs      05/23/17   0913   HGB  11.3*   HCT  34.7*   WBC  2.6*   PLT  132*         Plan of Care/Planned Procedure:  Risks, benefits, and alternatives reviewed with patient and he agrees to proceed with the procedure.        Marleni Pichardo MD

## 2017-05-23 NOTE — DISCHARGE INSTRUCTIONS
Thank you for allowing us to care for your today. If you should have any questions about your procedure or follow up care, please contact a member of your radiology team at [phone] or if after hours, please call [phone]. Today you received sedation for your procedure, those medications were:  Versed 3mg  Fentanyl 75mcg      Sedation for a Medical Procedure: Care Instructions  Your Care Instructions  For a minor procedure or surgery, you will get a sedative to help you relax. This drug will make you sleepy. It is usually given in a vein (by IV). A shot may also be used to numb the area. If you had local anesthesia, you may feel some pain and discomfort as it wears off. If you have pain, don't be afraid to say so. Pain medicine works better if you take it before the pain gets bad. Common side effects from sedation include:  · Feeling sleepy. (Your doctors and nurses will make sure you are not too sleepy to go home.)  · Nausea and vomiting. This usually does not last long. · Feeling tired. Follow-up care is a key part of your treatment and safety. Be sure to make and go to all appointments, and call your doctor if you are having problems. It's also a good idea to know your test results and keep a list of the medicines you take. How can you care for yourself at home? Activity  · Don't do anything for 24 hours that requires attention to detail. It takes time for the medicine effects to completely wear off. · For your safety, you should not drive or operate any machinery that could be dangerous until the medicine wears off and you can think clearly and react easily. · Rest when you feel tired. Getting enough sleep will help you recover. Diet  · You can eat your normal diet, unless your doctor gives you other instructions. If your stomach is upset, try clear liquids and bland, low-fat foods like plain toast or rice. · Drink plenty of fluids (unless your doctor tells you not to).   · Don't drink alcohol for 24 hours. Medicines  · Be safe with medicines. Read and follow all instructions on the label. ¨ If the doctor gave you a prescription medicine for pain, take it as prescribed. ¨ If you are not taking a prescription pain medicine, ask your doctor if you can take an over-the-counter medicine. · If you think your pain medicine is making you sick to your stomach:  ¨ Take your medicine after meals (unless your doctor has told you not to). ¨ Ask your doctor for a different pain medicine. When should you call for help? Call 911 anytime you think you may need emergency care. For example, call if:  · You have severe trouble breathing. · You passed out (lost consciousness). Call your doctor now or seek immediate medical care if:  · You have trouble breathing. · You have ongoing or worsening nausea or vomiting. · You have a fever. · You have a new or worse headache. · The medicine is not wearing off and you can't think clearly. Watch closely for changes in your health, and be sure to contact your doctor if:  · You do not get better as expected. Where can you learn more? Go to http://luis alfredo-chuck.info/. Enter Y138 in the search box to learn more about \"Sedation for a Medical Procedure: Care Instructions. \"  Current as of: August 14, 2016  Content Version: 11.2  © 7249-5357 [employer]. Care instructions adapted under license by [employer] (which disclaims liability or warranty for this information). If you have questions about a medical condition or this instruction, always ask your healthcare professional. [place] [address] any warranty or liability for your use of this information. Bone Marrow Aspiration and Biopsy: What to Expect at Home  Your Recovery  The biopsy site may feel sore for several days. It can help to walk, take pain medicine, and put ice packs on the site.  You will probably be able to return to work and your usual activities the day after the procedure. Your doctor or nurse will call you with the results of your test.  This care sheet gives you a general idea about how long it will take for you to recover. But each person recovers at a different pace. Follow the steps below to get better as quickly as possible. How can you care for yourself at home? Activity  · Rest when you feel tired. Getting enough sleep will help you recover. · You may drive when you are no longer taking pain pills and can quickly move your foot from the gas pedal to the brake. You must also be able to sit comfortably for a long period of time, even if you do not plan to go far. You might get caught in traffic. · Most people are able to return to work the day after the procedure. Medicines  · Your doctor will tell you if and when you can restart your medicines. He or she will also give you instructions about taking any new medicines. · If you take blood thinners, such as warfarin (Coumadin), clopidogrel (Plavix), or aspirin, be sure to talk to your doctor. He or she will tell you if and when to start taking those medicines again. Make sure that you understand exactly what your doctor wants you to do. · Be safe with medicines. Take pain medicines exactly as directed. ¨ If the doctor gave you a prescription medicine for pain, take it as prescribed. ¨ If you are not taking a prescription pain medicine, take an over-the-counter medicine such as acetaminophen (Tylenol), ibuprofen (Advil, Motrin), or naproxen (Aleve). Read and follow all instructions on the label. ¨ Do not take two or more pain medicines at the same time unless the doctor told you to. Many pain medicines have acetaminophen, which is Tylenol. Too much acetaminophen (Tylenol) can be harmful. · If you think your pain medicine is making you sick to your stomach:  ¨ Take your medicine after meals (unless your doctor has told you not to).   ¨ Ask your doctor for a different pain medicine. · If your doctor prescribed antibiotics, take them as directed. Do not stop taking them just because you feel better. Ice  · Put ice or a cold pack on the biopsy site for 10 to 20 minutes at a time. Put a thin cloth between the ice and your skin. Follow-up care is a key part of your treatment and safety. Be sure to make and go to all appointments, and call your doctor if you are having problems. It's also a good idea to know your test results and keep a list of the medicines you take. When should you call for help? Call 911 anytime you think you may need emergency care. For example, call if:  · You passed out (lost consciousness). Call your doctor now or seek immediate medical care if:  · You have signs of infection, such as:  ¨ Increased pain, swelling, warmth, or redness. ¨ Red streaks leading from the biopsy site. ¨ Pus draining from the biopsy site. ¨ Swollen lymph nodes in your neck, armpits, or groin. ¨ A fever. Watch closely for any changes in your health, and be sure to contact your doctor if:  · You are not getting better as expected. Where can you learn more? Go to http://luis alfredo-chuck.info/. Enter E148 in the search box to learn more about \"Bone Marrow Aspiration and Biopsy: What to Expect at Home. \"  Current as of: October 14, 2016  Content Version: 11.2  © 0851-4938 [employer]. Care instructions adapted under license by [employer] (which disclaims liability or warranty for this information). If you have questions about a medical condition or this instruction, always ask your healthcare professional. [place] [address] any warranty or liability for your use of this information.

## 2017-06-07 ENCOUNTER — HOSPITAL ENCOUNTER (OUTPATIENT)
Dept: PET IMAGING | Age: 72
Discharge: HOME | End: 2017-06-07
Attending: NURSE PRACTITIONER
Payer: MEDICARE

## 2017-06-07 VITALS — BODY MASS INDEX: 31.5 KG/M2 | WEIGHT: 225 LBS | HEIGHT: 71 IN

## 2017-06-07 DIAGNOSIS — C83.18 MANTLE CELL LYMPHOMA OF LYMPH NODES OF MULTIPLE REGIONS (HCC): ICD-10-CM

## 2017-06-07 PROCEDURE — A9552 F18 FDG: HCPCS

## 2017-06-07 RX ORDER — SODIUM CHLORIDE 0.9 % (FLUSH) 0.9 %
10 SYRINGE (ML) INJECTION
Status: COMPLETED | OUTPATIENT
Start: 2017-06-07 | End: 2017-06-07

## 2017-06-07 RX ADMIN — Medication 10 ML: at 07:53

## 2017-06-08 ENCOUNTER — TELEPHONE (OUTPATIENT)
Dept: ONCOLOGY | Age: 72
End: 2017-06-08

## 2017-06-08 ENCOUNTER — HOSPITAL ENCOUNTER (OUTPATIENT)
Dept: INFUSION THERAPY | Age: 72
Discharge: HOME | End: 2017-06-08
Payer: MEDICARE

## 2017-06-08 ENCOUNTER — OFFICE VISIT (OUTPATIENT)
Dept: ONCOLOGY | Age: 72
End: 2017-06-08

## 2017-06-08 VITALS
OXYGEN SATURATION: 95 % | DIASTOLIC BLOOD PRESSURE: 66 MMHG | BODY MASS INDEX: 31.78 KG/M2 | HEIGHT: 71 IN | WEIGHT: 227 LBS | SYSTOLIC BLOOD PRESSURE: 130 MMHG | TEMPERATURE: 95.6 F | RESPIRATION RATE: 20 BRPM | HEART RATE: 67 BPM

## 2017-06-08 VITALS
HEART RATE: 62 BPM | DIASTOLIC BLOOD PRESSURE: 92 MMHG | TEMPERATURE: 97 F | SYSTOLIC BLOOD PRESSURE: 156 MMHG | RESPIRATION RATE: 18 BRPM

## 2017-06-08 DIAGNOSIS — I26.99 OTHER ACUTE PULMONARY EMBOLISM WITHOUT ACUTE COR PULMONALE (HCC): ICD-10-CM

## 2017-06-08 DIAGNOSIS — F32.A DEPRESSION, UNSPECIFIED DEPRESSION TYPE: ICD-10-CM

## 2017-06-08 DIAGNOSIS — C83.18 MANTLE CELL LYMPHOMA OF LYMPH NODES OF MULTIPLE REGIONS (HCC): Primary | ICD-10-CM

## 2017-06-08 PROCEDURE — 74011250636 HC RX REV CODE- 250/636

## 2017-06-08 PROCEDURE — 74011000250 HC RX REV CODE- 250

## 2017-06-08 PROCEDURE — 96523 IRRIG DRUG DELIVERY DEVICE: CPT

## 2017-06-08 PROCEDURE — 77030012965 HC NDL HUBR BBMI -A

## 2017-06-08 RX ORDER — SODIUM CHLORIDE 9 MG/ML
10 INJECTION INTRAMUSCULAR; INTRAVENOUS; SUBCUTANEOUS AS NEEDED
Status: ACTIVE | OUTPATIENT
Start: 2017-06-08 | End: 2017-06-09

## 2017-06-08 RX ORDER — SODIUM CHLORIDE 0.9 % (FLUSH) 0.9 %
5-10 SYRINGE (ML) INJECTION AS NEEDED
Status: ACTIVE | OUTPATIENT
Start: 2017-06-08 | End: 2017-06-09

## 2017-06-08 RX ORDER — HEPARIN 100 UNIT/ML
500 SYRINGE INTRAVENOUS AS NEEDED
Status: ACTIVE | OUTPATIENT
Start: 2017-06-08 | End: 2017-06-09

## 2017-06-08 RX ADMIN — Medication 10 ML: at 14:28

## 2017-06-08 RX ADMIN — SODIUM CHLORIDE 10 ML: 9 INJECTION, SOLUTION INTRAMUSCULAR; INTRAVENOUS; SUBCUTANEOUS at 14:25

## 2017-06-08 RX ADMIN — HEPARIN 500 UNITS: 100 SYRINGE at 14:30

## 2017-06-08 RX ADMIN — Medication 10 ML: at 14:27

## 2017-06-08 NOTE — MR AVS SNAPSHOT
Visit Information Date & Time Provider Department Dept. Phone Encounter #  
 6/8/2017  1:15 PM Teresa Snider NP [facility] at [address] [place] [address] [id number] Follow-up Instructions Return for 4-6wk minnie gallegos. Your Appointments 7/6/2017  8:30 AM  
ESTABLISHED PATIENT with Annetta Mondragon MD  
[facility] at [address] [facility] [address] [place] [address]) Appt Note: 4wk Minnie gallegos [address] [facility], [address] Counts include 234 beds at the [facility] 99 [id number]  
[phone]  
  
   
 [address] [facility], [address] [place] [address] Upcoming Health Maintenance Date Due DTaP/Tdap/Td series (1 - Tdap) 7/5/1964 ZOSTER VACCINE AGE 60> 7/5/2003 GLAUCOMA SCREENING Q2Y 7/5/2008 Pneumococcal 65+ High/Highest Risk (1 of 2 - PCV13) 7/5/2008 MEDICARE YEARLY EXAM 7/5/2008 INFLUENZA AGE 9 TO ADULT 8/1/2017 FOBT Q 1 YEAR AGE 50-75 11/11/2017 Allergies as of 6/8/2017  Review Complete On: 6/8/2017 By: Timothy Ng NP No Known Allergies Current Immunizations  Reviewed on 5/11/2017 No immunizations on file. Not reviewed this visit You Were Diagnosed With   
  
 Codes Comments Mantle cell lymphoma of lymph nodes of multiple regions [facility])    -  Primary ICD-10-CM: C83.18 
ICD-9-CM: 200.48 Depression, unspecified depression type     ICD-10-CM: F32.9 ICD-9-CM: 139 Other acute pulmonary embolism without acute cor pulmonale (HCC)     ICD-10-CM: I26.99 
ICD-9-CM: 415.19 Vitals BP Pulse Temp Resp Height(growth percentile) Weight(growth percentile) 130/66 (BP 1 Location: Left arm, BP Patient Position: Sitting) 67 95.6 °F (35.3 °C) (Oral) 20 5' 11\" (1.803 m) 227 lb (103 kg) SpO2 BMI Smoking Status 95% 31.66 kg/m2 Former Smoker BMI and BSA Data Body Mass Index Body Surface Area  
 31.66 kg/m 2 2.27 m 2 Preferred Pharmacy Pharmacy Name Phone [facility] [address], [address] [phone] Your Updated Medication List  
  
   
This list is accurate as of: 6/8/17  2:26 PM.  Always use your most recent med list.  
  
  
  
  
 enoxaparin 120 mg/0.8 mL injection Commonly known as:  LOVENOX INJECT ONE SYRINGEFUL (.8 ML / 120 MG) UNDER THE SKIN EVERY 12 HORUS  
  
 lidocaine-prilocaine topical cream  
Commonly known as:  EMLA Apply  to affected area as needed for Pain (Apply 30-60 min. prior to having your port accessed). Follow-up Instructions Return for 4-6wk kristina gallegos. Introducing \[facility]\"" & [facility] SERVICES! Dear Sonya Ruiz: Thank you for requesting a [email or web address] account. Our records indicate that you already have an active [email or web address] account. You can access your account anytime at [email or web address] Did you know that you can access your hospital and ER discharge instructions at any time in [email or web address]? You can also review all of your test results from your hospital stay or ER visit. Additional Information If you have questions, please visit the Frequently Asked Questions section of the [email or web address] website at [email or web address] Remember, [email or web address] is NOT to be used for urgent needs. For medical emergencies, dial 911. Now available from your iPhone and Android! Please provide this summary of care documentation to your next provider. Your primary care clinician is listed as Viv Morin. If you have any questions after today's visit, please call [phone].

## 2017-06-08 NOTE — PROGRESS NOTES
Outpatient Infusion Center Short Visit Progress Note    1400 Pt admit to [facility] for Port flush ambulatory in stable condition. Assessment completed. No new concerns voiced. PAC with positive blood return and flush, de accessed. bandaid applied. Patient Vitals for the past 12 hrs:   Temp Pulse Resp BP   06/08/17 1437 97 °F (36.1 °C) 62 18 (!) 156/92         1450 Pt tolerated treatment well. D/c home ambulatory in no distress.  Pt aware of next appointment scheduled for 7/6/17 at 1000

## 2017-06-08 NOTE — PROGRESS NOTES
[address] [facility] at [address] [place] [address]  [phone]    Hematology / Oncology Follow Up    Reason for Visit:   Mayra Zepeda is a 66 y.o. male who is seen for follow up of lymphoma. Treatment History:   · CTA Chest 11/11/2016: PE in left lower lobe pulmonary arteries, extensive adneopathy  · CT A/P 11/12/2016: Cirrhosis, massive splenomegaly, ascites, massive lymphadenopathy  · US guided axillary node biopsy 11/14/2016: CD5+ B-cell non-hodgkin lymphoma, phenotype most consistent with mantle cell lymphoma. FISH with t(11;14)  · PET-CT 11/23/2016: Marked splenomegaly with increased metabolic activity consistent with lymphoma. Bilateral cervical and axillary adenopathy. Mediastinal and right hilar and left diaphragmatic adenopathy. Bilateral pelvic and inguinal adenopathy. Multiple small mesenteric and retroperitoneal nodes with low grade or no activity. · BMBx 11/29/2016: Hypercellular marrow involved by mantle cell lymphoma  · Stage IV Mantle Cell Lymphoma  · Palliative chemotherapy with Rituximab and Bendamustine x6 cycles from 12/8/2016 to 5/12/17  · Bone marrow biopsy 5/23/2017: no evidence of lymphoma  · PET/CT 6/7/2017: There has been marked improvement in the adenopathy in the cervical region, axilla, mediastinum, abdomen and pelvis which now demonstrate no increased metabolic activity. There has been marked decrease in the spleen which now demonstrates no increased metabolic activity. History of Present Illness:   Here today for follow up. He reports feeling much better now that treatment is done. His energy levels are improving. PAST HISTORY: The following sections were reviewed and updated in the EMR as appropriate: PMH, SH, FH, Medications, Allergies. No Known Allergies     Review of Systems: A complete review of systems was obtained, reviewed, and scanned into the EMR. Pertinent findings reviewed above.     Physical Exam:     Visit Vitals    /66 (BP 1 Location: Left arm, BP Patient Position: Sitting)    Pulse 67    Temp 95.6 °F (35.3 °C) (Oral)    Resp 20    Ht 5' 11\" (1.803 m)    Wt 227 lb (103 kg)    SpO2 95%    BMI 31.66 kg/m2     ECOG PS: 1  General: No distress  Eyes: PERRLA, anicteric sclerae  HENT: Atraumatic, OP clear  Neck: Supple  Lymphatic: No cervical, supraclavicular, or inguinal adenopathy  Respiratory: CTAB, normal respiratory effort  CV: Normal rate, regular rhythm, no murmurs. Bilateral LE edema, 1+, compression stockings in place  GI: Soft, nontender, nondistended, no masses, no hepatomegaly, no splenomegaly  MS: Normal gait and station. Digits without clubbing or cyanosis. Skin: No rashes, ecchymoses, or petechiae. Normal temperature, turgor, and texture. Psych: Alert, oriented, appropriate affect, normal judgment/insight    Results:     Lab Results   Component Value Date/Time    WBC 2.6 05/23/2017 09:13 AM    HGB 11.3 05/23/2017 09:13 AM    HCT 34.7 05/23/2017 09:13 AM    PLATELET 571 61/02/9309 09:13 AM    MCV 89.0 05/23/2017 09:13 AM    ABS.  NEUTROPHILS 1.7 05/23/2017 09:13 AM    Hemoglobin (POC) 10.9 01/12/2017 08:02 AM    Hematocrit (POC) 32 01/12/2017 08:02 AM     Lab Results   Component Value Date/Time    Sodium 141 05/10/2017 01:05 PM    Potassium 4.6 05/10/2017 01:05 PM    Chloride 106 05/10/2017 01:05 PM    CO2 27 05/10/2017 01:05 PM    Glucose 89 05/10/2017 01:05 PM    BUN 27 05/10/2017 01:05 PM    Creatinine 1.10 05/10/2017 01:05 PM    GFR est AA >60 05/10/2017 01:05 PM    GFR est non-AA >60 05/10/2017 01:05 PM    Calcium 8.9 05/10/2017 01:05 PM    Sodium (POC) 143 01/12/2017 08:02 AM    Potassium (POC) 4.0 01/12/2017 08:02 AM    Chloride (POC) 104 01/12/2017 08:02 AM    Glucose (POC) 156 01/12/2017 08:02 AM    BUN (POC) 23 01/12/2017 08:02 AM    Creatinine (POC) 0.8 01/12/2017 08:02 AM    Calcium, ionized (POC) 1.19 01/12/2017 08:02 AM     Lab Results   Component Value Date/Time    Bilirubin, total 0.3 05/10/2017 01:05 PM ALT (SGPT) 28 05/10/2017 01:05 PM    AST (SGOT) 24 05/10/2017 01:05 PM    Alk. phosphatase 103 05/10/2017 01:05 PM    Protein, total 6.9 05/10/2017 01:05 PM    Albumin 3.4 05/10/2017 01:05 PM    Globulin 3.5 05/10/2017 01:05 PM     Lab Results   Component Value Date/Time    Reticulocyte count 1.8 11/10/2016 02:00 PM    Iron % saturation 11 11/10/2016 02:00 PM    TIBC 240 11/10/2016 02:00 PM    Ferritin 100 11/10/2016 02:00 PM    Vitamin B12 415 11/10/2016 02:00 PM    Folate 15.6 11/10/2016 02:00 PM    Haptoglobin 186 11/11/2016 12:04 PM     11/10/2016 02:00 PM    Beta-2 Microglobulin, serum 4.4 11/15/2016 05:21 AM    TSH 4.06 11/10/2016 11:21 AM    Hep C  virus Ab Interp. NONREACTIVE 08/02/2016 08:54 AM     Lab Results   Component Value Date/Time    INR 1.1 11/11/2016 12:04 PM    aPTT 31.3 11/11/2016 12:04 PM    Fibrinogen 215 11/11/2016 12:04 PM       HepB/C negative  TTE 11/10/2016: EF 60%    Bone marrow biopsy 11/29/2016: Hypercellular marrow involved by mantle cell lymphoma    Bone marrow biopsy 5/23/17: no evidence of lymphoma    PET 6/7/17: no abnormal hypermetabolism    Assessment:   1) Mantle cell lymphoma  Stage IV  His cancer is not curable and management is with palliative intent. Bone marrow results are positive for marrow involvement, increasing this to stage IV. His MIPI score is 6.24, which is High Risk and cooresponds to a median survival of 37 months and 5-year OS of 20%. He has completed chemotherapy with R-Bendamustine for 6 cycles. He has had an excellent response to therapy based on repeat bone marrow biopsy and PET/CT. We discussed options moving forward including referral to [facility] BMT for consideration of stem cell transplant, maintenance rituximab or surveillance alone. My recommendation is for referral to [facility] BMT to see if he is candidate for transplant as he has had a good response to therapy. He is agreeable. 2) Depression  Stable. Off lexapro.     3) Cirrhosis  Unclear if this is related to the lymphoma. GI following. 4) DVT, PE  Diagnosed 11/11/2016. Malignancy associated. Continue lovenox bid for now. At some point we may want to consider switching to a DOAC, but we'll see what BMT has planned for him first.    5) Anemia  Stable. Secondary to #1, and perhaps iron deficiency. He stopped the iron supplements due to nausea. Monitor. 6) Thrombocytopenia  Resolved. Secondary to splenomegaly. Monitor. 7) Neutropenia  Resolved with holding therapy. Monitor.     Plan:     · Referral to [facility] BMT clinic  · Continue lovenox 1mg/kig BID  · Port flush every 4-6 weeks  · CBC, CMP and LD every 12 weeks  · Return to clinic in 4-6 weeks       Signed By: Petr Cardenas MD     June 8, 2017

## 2017-06-08 NOTE — TELEPHONE ENCOUNTER
[employer]  Medical Oncology at [address] [place] [address]    06/09/17- New referral placed to [facility] bone marrow transplant clinic [phone]- voicemail left for NP coordinator. Records faxed to [phone]     Artile returned phone call she confirmed records have been received and she will reach out to patient to schedule initial appointment. She will also call our office to inform us of date.     06/12/17- BMT clinic called to report patient is scheduled for June 30 th at 8:30am with Kristen Bauman

## 2017-07-06 ENCOUNTER — OFFICE VISIT (OUTPATIENT)
Dept: ONCOLOGY | Age: 72
End: 2017-07-06

## 2017-07-06 ENCOUNTER — HOSPITAL ENCOUNTER (OUTPATIENT)
Dept: INFUSION THERAPY | Age: 72
Discharge: HOME | End: 2017-07-06
Payer: MEDICARE

## 2017-07-06 VITALS
HEART RATE: 64 BPM | TEMPERATURE: 97.7 F | SYSTOLIC BLOOD PRESSURE: 154 MMHG | DIASTOLIC BLOOD PRESSURE: 93 MMHG | RESPIRATION RATE: 18 BRPM

## 2017-07-06 VITALS
BODY MASS INDEX: 32.98 KG/M2 | HEART RATE: 67 BPM | RESPIRATION RATE: 20 BRPM | TEMPERATURE: 97.8 F | SYSTOLIC BLOOD PRESSURE: 177 MMHG | OXYGEN SATURATION: 96 % | HEIGHT: 71 IN | DIASTOLIC BLOOD PRESSURE: 95 MMHG | WEIGHT: 235.6 LBS

## 2017-07-06 DIAGNOSIS — C83.18 MANTLE CELL LYMPHOMA OF LYMPH NODES OF MULTIPLE REGIONS (HCC): Primary | ICD-10-CM

## 2017-07-06 PROCEDURE — 96523 IRRIG DRUG DELIVERY DEVICE: CPT

## 2017-07-06 PROCEDURE — 74011250636 HC RX REV CODE- 250/636

## 2017-07-06 PROCEDURE — 77030012965 HC NDL HUBR BBMI -A

## 2017-07-06 PROCEDURE — 74011000250 HC RX REV CODE- 250

## 2017-07-06 RX ORDER — HEPARIN 100 UNIT/ML
500 SYRINGE INTRAVENOUS AS NEEDED
Status: ACTIVE | OUTPATIENT
Start: 2017-07-06 | End: 2017-07-07

## 2017-07-06 RX ORDER — SODIUM CHLORIDE 0.9 % (FLUSH) 0.9 %
10-40 SYRINGE (ML) INJECTION AS NEEDED
Status: ACTIVE | OUTPATIENT
Start: 2017-07-06 | End: 2017-07-07

## 2017-07-06 RX ORDER — SODIUM CHLORIDE 9 MG/ML
10 INJECTION INTRAMUSCULAR; INTRAVENOUS; SUBCUTANEOUS AS NEEDED
Status: ACTIVE | OUTPATIENT
Start: 2017-07-06 | End: 2017-07-07

## 2017-07-06 RX ADMIN — Medication 20 ML: at 10:06

## 2017-07-06 RX ADMIN — SODIUM CHLORIDE 10 ML: 9 INJECTION INTRAMUSCULAR; INTRAVENOUS; SUBCUTANEOUS at 10:06

## 2017-07-06 RX ADMIN — HEPARIN SODIUM (PORCINE) LOCK FLUSH IV SOLN 100 UNIT/ML 500 UNITS: 100 SOLUTION at 10:06

## 2017-07-06 NOTE — PROGRESS NOTES
[address] [facility] at [address] [place] [address]  [phone]    Hematology / Oncology Follow Up    Reason for Visit:   Amanda Ruiz is a 74 y.o. male who is seen for follow up of lymphoma. Treatment History:   · CTA Chest 11/11/2016: PE in left lower lobe pulmonary arteries, extensive adneopathy  · CT A/P 11/12/2016: Cirrhosis, massive splenomegaly, ascites, massive lymphadenopathy  · US guided axillary node biopsy 11/14/2016: CD5+ B-cell non-hodgkin lymphoma, phenotype most consistent with mantle cell lymphoma. FISH with t(11;14)  · PET-CT 11/23/2016: Marked splenomegaly with increased metabolic activity consistent with lymphoma. Bilateral cervical and axillary adenopathy. Mediastinal and right hilar and left diaphragmatic adenopathy. Bilateral pelvic and inguinal adenopathy. Multiple small mesenteric and retroperitoneal nodes with low grade or no activity. · BMBx 11/29/2016: Hypercellular marrow involved by mantle cell lymphoma  · Stage IV Mantle Cell Lymphoma  · Palliative chemotherapy with Rituximab and Bendamustine x6 cycles from 12/8/2016 to 5/12/17  · Bone marrow biopsy 5/23/2017: no evidence of lymphoma  · PET/CT 6/7/2017: There has been marked improvement in the adenopathy in the cervical region, axilla, mediastinum, abdomen and pelvis which now demonstrate no increased metabolic activity. There has been marked decrease in the spleen which now demonstrates no increased metabolic activity. History of Present Illness:   He is feeling very well. Eating more, gaining more weight than he wants to. Energy is good. No fevers, chills, night sweats, weight loss, adenopathy. Dealing with some family stressors. Saw BMT since last here, has not made a final decision as he is working through some social issues first.      PAST HISTORY: The following sections were reviewed and updated in the EMR as appropriate: PMH, SH, FH, Medications, Allergies.     No Known Allergies     Review of Systems: A complete review of systems was obtained, reviewed, and scanned into the EMR. Pertinent findings reviewed above. Physical Exam:     Visit Vitals    BP (!) 177/95 (BP 1 Location: Right arm, BP Patient Position: Sitting)    Pulse 67    Temp 97.8 °F (36.6 °C) (Temporal)    Resp 20    Ht 5' 11\" (1.803 m)    Wt 235 lb 9.6 oz (106.9 kg)    SpO2 96%    BMI 32.86 kg/m2     ECOG PS: 1  General: No distress  Eyes: PERRLA, anicteric sclerae  HENT: Atraumatic, OP clear  Neck: Supple  Lymphatic: No cervical, supraclavicular, or inguinal adenopathy  Respiratory: CTAB, normal respiratory effort  CV: Normal rate, regular rhythm, no murmurs. Bilateral LE edema, 1+, compression stockings in place  GI: Soft, nontender, nondistended, no masses, no hepatomegaly, no splenomegaly  MS: Normal gait and station. Digits without clubbing or cyanosis. Skin: No rashes, ecchymoses, or petechiae. Normal temperature, turgor, and texture. Psych: Alert, oriented, appropriate affect, normal judgment/insight    Results:     Lab Results   Component Value Date/Time    WBC 2.6 05/23/2017 09:13 AM    HGB 11.3 05/23/2017 09:13 AM    HCT 34.7 05/23/2017 09:13 AM    PLATELET 214 76/49/1409 09:13 AM    MCV 89.0 05/23/2017 09:13 AM    ABS.  NEUTROPHILS 1.7 05/23/2017 09:13 AM    Hemoglobin (POC) 10.9 01/12/2017 08:02 AM    Hematocrit (POC) 32 01/12/2017 08:02 AM     Lab Results   Component Value Date/Time    Sodium 141 05/10/2017 01:05 PM    Potassium 4.6 05/10/2017 01:05 PM    Chloride 106 05/10/2017 01:05 PM    CO2 27 05/10/2017 01:05 PM    Glucose 89 05/10/2017 01:05 PM    BUN 27 05/10/2017 01:05 PM    Creatinine 1.10 05/10/2017 01:05 PM    GFR est AA >60 05/10/2017 01:05 PM    GFR est non-AA >60 05/10/2017 01:05 PM    Calcium 8.9 05/10/2017 01:05 PM    Sodium (POC) 143 01/12/2017 08:02 AM    Potassium (POC) 4.0 01/12/2017 08:02 AM    Chloride (POC) 104 01/12/2017 08:02 AM    Glucose (POC) 156 01/12/2017 08:02 AM    BUN (POC) 23 01/12/2017 08:02 AM    Creatinine (POC) 0.8 01/12/2017 08:02 AM    Calcium, ionized (POC) 1.19 01/12/2017 08:02 AM     Lab Results   Component Value Date/Time    Bilirubin, total 0.3 05/10/2017 01:05 PM    ALT (SGPT) 28 05/10/2017 01:05 PM    AST (SGOT) 24 05/10/2017 01:05 PM    Alk. phosphatase 103 05/10/2017 01:05 PM    Protein, total 6.9 05/10/2017 01:05 PM    Albumin 3.4 05/10/2017 01:05 PM    Globulin 3.5 05/10/2017 01:05 PM     Lab Results   Component Value Date/Time    Reticulocyte count 1.8 11/10/2016 02:00 PM    Iron % saturation 11 11/10/2016 02:00 PM    TIBC 240 11/10/2016 02:00 PM    Ferritin 100 11/10/2016 02:00 PM    Vitamin B12 415 11/10/2016 02:00 PM    Folate 15.6 11/10/2016 02:00 PM    Haptoglobin 186 11/11/2016 12:04 PM     11/10/2016 02:00 PM    Beta-2 Microglobulin, serum 4.4 11/15/2016 05:21 AM    TSH 4.06 11/10/2016 11:21 AM    Hep C  virus Ab Interp. NONREACTIVE 08/02/2016 08:54 AM     Lab Results   Component Value Date/Time    INR 1.1 11/11/2016 12:04 PM    aPTT 31.3 11/11/2016 12:04 PM    Fibrinogen 215 11/11/2016 12:04 PM       HepB/C negative  TTE 11/10/2016: EF 60%    Bone marrow biopsy 11/29/2016: Hypercellular marrow involved by mantle cell lymphoma    Bone marrow biopsy 5/23/17: no evidence of lymphoma    PET 6/7/17: no abnormal hypermetabolism    Assessment:   1) Mantle cell lymphoma  Stage IV  His cancer is not curable and management is with palliative intent. Bone marrow results are positive for marrow involvement, increasing this to stage IV. His MIPI score is 6.24, which is High Risk and cooresponds to a median survival of 37 months and 5-year OS of 20%. He has completed chemotherapy with R-Bendamustine for 6 cycles. He has had an excellent response to therapy based on repeat bone marrow biopsy and PET/CT. I have recommended consolidative stem cell transplant. [facility] saw [facility] and they agree.   He is interested in moving forward with this, but he is trying to get some family/social issues sorted out first.  I encouraged him not to delay. For now, I will plan to see him back with his next port flush. 2) DVT, PE  Diagnosed 11/11/2016. Malignancy associated. Continue lovenox bid for now. At some point we may want to consider switching to a DOAC, but will await BMT plans. 3) Anemia  Stable. Secondary to #1, and perhaps iron deficiency. He stopped the iron supplements due to nausea. Monitor. 4) Thrombocytopenia  Mild. Secondary to splenomegaly/cirrhosis. Monitor. 5) Neutropenia  Resolved since stopping therapy.     Plan:     · Patient to make final decision about stem cell transplant  · Continue lovenox 1mg/kig BID  · Port flush every 4-6 weeks  · CBC, CMP and LD every 12 weeks  · Return to clinic in 4-6 weeks       Signed By: Lorena Velasco MD     July 6, 2017

## 2017-08-03 ENCOUNTER — OFFICE VISIT (OUTPATIENT)
Dept: ONCOLOGY | Age: 72
End: 2017-08-03

## 2017-08-03 ENCOUNTER — HOSPITAL ENCOUNTER (OUTPATIENT)
Dept: INFUSION THERAPY | Age: 72
Discharge: HOME | End: 2017-08-03
Payer: MEDICARE

## 2017-08-03 VITALS
HEART RATE: 57 BPM | TEMPERATURE: 97.4 F | SYSTOLIC BLOOD PRESSURE: 139 MMHG | DIASTOLIC BLOOD PRESSURE: 88 MMHG | RESPIRATION RATE: 18 BRPM

## 2017-08-03 VITALS
TEMPERATURE: 96.8 F | RESPIRATION RATE: 16 BRPM | HEART RATE: 65 BPM | WEIGHT: 231.4 LBS | OXYGEN SATURATION: 94 % | DIASTOLIC BLOOD PRESSURE: 87 MMHG | SYSTOLIC BLOOD PRESSURE: 156 MMHG | BODY MASS INDEX: 32.4 KG/M2 | HEIGHT: 71 IN

## 2017-08-03 DIAGNOSIS — Z95.828 PORT CATHETER IN PLACE: ICD-10-CM

## 2017-08-03 DIAGNOSIS — D69.6 THROMBOCYTOPENIA (HCC): ICD-10-CM

## 2017-08-03 DIAGNOSIS — D64.9 ANEMIA, UNSPECIFIED TYPE: ICD-10-CM

## 2017-08-03 DIAGNOSIS — C83.18 MANTLE CELL LYMPHOMA OF LYMPH NODES OF MULTIPLE REGIONS (HCC): Primary | ICD-10-CM

## 2017-08-03 DIAGNOSIS — I82.403: ICD-10-CM

## 2017-08-03 DIAGNOSIS — I26.99 OTHER ACUTE PULMONARY EMBOLISM WITHOUT ACUTE COR PULMONALE (HCC): ICD-10-CM

## 2017-08-03 PROCEDURE — 77030012965 HC NDL HUBR BBMI -A

## 2017-08-03 PROCEDURE — 74011000250 HC RX REV CODE- 250: Performed by: INTERNAL MEDICINE

## 2017-08-03 PROCEDURE — 96523 IRRIG DRUG DELIVERY DEVICE: CPT

## 2017-08-03 PROCEDURE — 74011250636 HC RX REV CODE- 250/636: Performed by: INTERNAL MEDICINE

## 2017-08-03 RX ORDER — SODIUM CHLORIDE 9 MG/ML
10 INJECTION INTRAMUSCULAR; INTRAVENOUS; SUBCUTANEOUS AS NEEDED
Status: ACTIVE | OUTPATIENT
Start: 2017-08-03 | End: 2017-08-04

## 2017-08-03 RX ORDER — HEPARIN 100 UNIT/ML
500 SYRINGE INTRAVENOUS AS NEEDED
Status: ACTIVE | OUTPATIENT
Start: 2017-08-03 | End: 2017-08-04

## 2017-08-03 RX ORDER — SODIUM CHLORIDE 0.9 % (FLUSH) 0.9 %
10-40 SYRINGE (ML) INJECTION AS NEEDED
Status: DISCONTINUED | OUTPATIENT
Start: 2017-08-03 | End: 2017-08-07 | Stop reason: HOSPADM

## 2017-08-03 RX ADMIN — SODIUM CHLORIDE 10 ML: 9 INJECTION INTRAMUSCULAR; INTRAVENOUS; SUBCUTANEOUS at 09:02

## 2017-08-03 RX ADMIN — Medication 500 UNITS: at 09:07

## 2017-08-03 RX ADMIN — Medication 10 ML: at 09:02

## 2017-08-03 NOTE — PROGRESS NOTES
Problem: Knowledge Deficit  Goal: *Verbalizes understanding of procedures and medications  Outcome: Progressing Towards Goal  PT arrived at [facility] ambulatory and in no distress for port flush. PT tolerated this well.

## 2017-08-03 NOTE — PROGRESS NOTES
Outpatient Infusion Center Short Visit Nursing Progress Note    7719  Patient arrived ambulatory for Port flush. VS Blood pressure 139/88, pulse (!) 57, temperature 97.4 °F (36.3 °C), resp. rate 18.        0910 PAC flushed/deaccessed and patient left in no distress; next appt. 8/31/17 @ 1000.

## 2017-08-03 NOTE — MR AVS SNAPSHOT
Visit Information Date & Time Provider Department Dept. Phone Encounter #  
 8/3/2017  8:00 AM Lorena Velasco MD [facility] at [address] [id number] Follow-up Instructions Return in about 4 weeks (around 8/31/2017) for Aleida, then [facility] for port flush, surveillance mantle cell. Your Appointments 8/31/2017  8:15 AM  
ESTABLISHED PATIENT with Ritu Villavicencio NP  
[facility] at [address] [place] [address] [facility] Appt Note: 4 wk, labs portflush [address] [place] [address]  
[phone]  
  
   
 [address] [facility] Upcoming Health Maintenance Date Due DTaP/Tdap/Td series (1 - Tdap) 7/5/1966 ZOSTER VACCINE AGE 60> 5/5/2003 GLAUCOMA SCREENING Q2Y 7/5/2008 Pneumococcal 65+ High/Highest Risk (1 of 2 - PCV13) 7/5/2008 MEDICARE YEARLY EXAM 7/5/2008 INFLUENZA AGE 9 TO ADULT 8/1/2017 FOBT Q 1 YEAR AGE 50-75 11/11/2017 Allergies as of 8/3/2017  Review Complete On: 8/3/2017 By: Lorena Velasco MD  
 No Known Allergies Current Immunizations  Reviewed on 6/8/2017 No immunizations on file. Not reviewed this visit You Were Diagnosed With   
  
 Codes Comments Mantle cell lymphoma of lymph nodes of multiple regions [facility])    -  Primary ICD-10-CM: C83.18 
ICD-9-CM: 200.48 Thromboembolism of deep veins of lower extremity, bilateral (HCC)     ICD-10-CM: I82.403 ICD-9-CM: 453.40 Other acute pulmonary embolism without acute cor pulmonale (HCC)     ICD-10-CM: I26.99 
ICD-9-CM: 415.19 Anemia, unspecified type     ICD-10-CM: D64.9 ICD-9-CM: 730. 9 Thrombocytopenia ([facility] [address]     ICD-10-CM: D69.6 ICD-9-CM: 287.5 Chemotherapy induced neutropenia (HCC)     ICD-10-CM: D70.1, T45.1X5A 
ICD-9-CM: 288.03, E933.1 Port catheter in place     ICD-10-CM: M49.980 ICD-9-CM: V45.89 Vitals BP Pulse Temp Resp Height(growth percentile) Weight(growth percentile) 156/87 (BP 1 Location: Right arm, BP Patient Position: Sitting) 65 96.8 °F (36 °C) (Oral) 16 5' 11\" (1.803 m) 231 lb 6.4 oz (105 kg) SpO2 BMI Smoking Status 94% 32.27 kg/m2 Former Smoker BMI and BSA Data Body Mass Index Body Surface Area  
 32.27 kg/m 2 2.29 m 2 Preferred Pharmacy Pharmacy Name Phone [facility], [address] [phone] Your Updated Medication List  
  
   
This list is accurate as of: 8/3/17  8:38 AM.  Always use your most recent med list.  
  
  
  
  
 apixaban 5 mg tablet Commonly known as:  Kathy Ramiro Take 1 Tab by mouth two (2) times a day. lidocaine-prilocaine topical cream  
Commonly known as:  EMLA Apply  to affected area as needed for Pain (Apply 30-60 min. prior to having your port accessed). Prescriptions Sent to Pharmacy Refills  
 apixaban (ELIQUIS) 5 mg tablet 11 Sig: Take 1 Tab by mouth two (2) times a day. Class: Normal  
 Pharmacy: [facility] [address], [address] #: [phone] Route: Oral  
  
Follow-up Instructions Return in about 4 weeks (around 8/31/2017) for Aleida, then [facility] for port flush, surveillance mantle cell. To-Do List   
 08/03/2017  9:00 AM  
  Appointment with [address] at [place] [address] ([phone])  
  
 08/31/2017 10:00 AM  
  Appointment with [address] at [place] [address] ([phone])  
  
 09/28/2017 10:00 AM  
  Appointment with [address] at [place] [address] ([phone]) [facility]! Dear Charles Jones: Thank you for requesting a ELIKEt account. Our records indicate that you already have an active Finderyhart account.   You can access your account anytime at [email or web address]. aXess [email or web address] Did you know that you can access your hospital and ER discharge instructions at any time in Balakam? You can also review all of your test results from your hospital stay or ER visit. Additional Information If you have questions, please visit the Frequently Asked Questions section of the Balakam website at [email or web address] Remember, Balakam is NOT to be used for urgent needs. For medical emergencies, dial 911. Now available from your iPhone and Android! Please provide this summary of care documentation to your next provider. Your primary care clinician is listed as Allen Lucero. If you have any questions after today's visit, please call [phone].

## 2017-08-31 ENCOUNTER — HOSPITAL ENCOUNTER (OUTPATIENT)
Dept: INFUSION THERAPY | Age: 72
Discharge: HOME | End: 2017-08-31
Payer: MEDICARE

## 2017-08-31 ENCOUNTER — OFFICE VISIT (OUTPATIENT)
Dept: ONCOLOGY | Age: 72
End: 2017-08-31

## 2017-08-31 VITALS
OXYGEN SATURATION: 96 % | HEIGHT: 71 IN | WEIGHT: 237.6 LBS | HEART RATE: 68 BPM | BODY MASS INDEX: 33.26 KG/M2 | TEMPERATURE: 96.3 F | SYSTOLIC BLOOD PRESSURE: 146 MMHG | RESPIRATION RATE: 18 BRPM | DIASTOLIC BLOOD PRESSURE: 87 MMHG

## 2017-08-31 VITALS
HEART RATE: 67 BPM | SYSTOLIC BLOOD PRESSURE: 138 MMHG | RESPIRATION RATE: 16 BRPM | DIASTOLIC BLOOD PRESSURE: 87 MMHG | TEMPERATURE: 97.5 F

## 2017-08-31 DIAGNOSIS — C83.18 MANTLE CELL LYMPHOMA OF LYMPH NODES OF MULTIPLE REGIONS (HCC): Primary | ICD-10-CM

## 2017-08-31 DIAGNOSIS — I82.403: ICD-10-CM

## 2017-08-31 DIAGNOSIS — D69.6 THROMBOCYTOPENIA (HCC): ICD-10-CM

## 2017-08-31 DIAGNOSIS — D64.9 ANEMIA, UNSPECIFIED TYPE: ICD-10-CM

## 2017-08-31 DIAGNOSIS — Z95.828 PORT CATHETER IN PLACE: ICD-10-CM

## 2017-08-31 LAB
ALBUMIN SERPL-MCNC: 3.8 G/DL (ref 3.5–5)
ALBUMIN/GLOB SERPL: 1.1 {RATIO} (ref 1.1–2.2)
ALP SERPL-CCNC: 95 U/L (ref 45–117)
ALT SERPL-CCNC: 24 U/L (ref 12–78)
ANION GAP SERPL CALC-SCNC: 7 MMOL/L (ref 5–15)
AST SERPL-CCNC: 20 U/L (ref 15–37)
BASOPHILS # BLD: 0 K/UL (ref 0–0.1)
BASOPHILS NFR BLD: 1 % (ref 0–1)
BILIRUB SERPL-MCNC: 0.5 MG/DL (ref 0.2–1)
BUN SERPL-MCNC: 27 MG/DL (ref 6–20)
BUN/CREAT SERPL: 22 (ref 12–20)
CALCIUM SERPL-MCNC: 9.5 MG/DL (ref 8.5–10.1)
CHLORIDE SERPL-SCNC: 106 MMOL/L (ref 97–108)
CO2 SERPL-SCNC: 30 MMOL/L (ref 21–32)
CREAT SERPL-MCNC: 1.22 MG/DL (ref 0.7–1.3)
DIFFERENTIAL METHOD BLD: ABNORMAL
EOSINOPHIL # BLD: 0.1 K/UL (ref 0–0.4)
EOSINOPHIL NFR BLD: 2 % (ref 0–7)
ERYTHROCYTE [DISTWIDTH] IN BLOOD BY AUTOMATED COUNT: 13.1 % (ref 11.5–14.5)
GLOBULIN SER CALC-MCNC: 3.6 G/DL (ref 2–4)
GLUCOSE SERPL-MCNC: 98 MG/DL (ref 65–100)
HCT VFR BLD AUTO: 35.9 % (ref 36.6–50.3)
HGB BLD-MCNC: 11.9 G/DL (ref 12.1–17)
LDH SERPL L TO P-CCNC: 287 U/L (ref 85–241)
LYMPHOCYTES # BLD: 0.7 K/UL (ref 0.8–3.5)
LYMPHOCYTES NFR BLD: 16 % (ref 12–49)
MCH RBC QN AUTO: 30.4 PG (ref 26–34)
MCHC RBC AUTO-ENTMCNC: 33.1 G/DL (ref 30–36.5)
MCV RBC AUTO: 91.8 FL (ref 80–99)
MONOCYTES # BLD: 0.6 K/UL (ref 0–1)
MONOCYTES NFR BLD: 12 % (ref 5–13)
NEUTS BAND NFR BLD MANUAL: 3 % (ref 0–6)
NEUTS SEG # BLD: 3.2 K/UL (ref 1.8–8)
NEUTS SEG NFR BLD: 66 % (ref 32–75)
PLATELET # BLD AUTO: 167 K/UL (ref 150–400)
POTASSIUM SERPL-SCNC: 4.4 MMOL/L (ref 3.5–5.1)
PROT SERPL-MCNC: 7.4 G/DL (ref 6.4–8.2)
RBC # BLD AUTO: 3.91 M/UL (ref 4.1–5.7)
RBC MORPH BLD: ABNORMAL
SODIUM SERPL-SCNC: 143 MMOL/L (ref 136–145)
WBC # BLD AUTO: 4.6 K/UL (ref 4.1–11.1)
WBC MORPH BLD: ABNORMAL

## 2017-08-31 PROCEDURE — 80053 COMPREHEN METABOLIC PANEL: CPT | Performed by: INTERNAL MEDICINE

## 2017-08-31 PROCEDURE — 36591 DRAW BLOOD OFF VENOUS DEVICE: CPT

## 2017-08-31 PROCEDURE — 74011250636 HC RX REV CODE- 250/636

## 2017-08-31 PROCEDURE — 83615 LACTATE (LD) (LDH) ENZYME: CPT | Performed by: INTERNAL MEDICINE

## 2017-08-31 PROCEDURE — 74011000250 HC RX REV CODE- 250

## 2017-08-31 PROCEDURE — 85025 COMPLETE CBC W/AUTO DIFF WBC: CPT | Performed by: INTERNAL MEDICINE

## 2017-08-31 PROCEDURE — 77030012965 HC NDL HUBR BBMI -A

## 2017-08-31 PROCEDURE — 36415 COLL VENOUS BLD VENIPUNCTURE: CPT | Performed by: INTERNAL MEDICINE

## 2017-08-31 RX ORDER — HEPARIN 100 UNIT/ML
500 SYRINGE INTRAVENOUS AS NEEDED
Status: ACTIVE | OUTPATIENT
Start: 2017-08-31 | End: 2017-09-01

## 2017-08-31 RX ORDER — SODIUM CHLORIDE 0.9 % (FLUSH) 0.9 %
10 SYRINGE (ML) INJECTION AS NEEDED
Status: ACTIVE | OUTPATIENT
Start: 2017-08-31 | End: 2017-09-01

## 2017-08-31 RX ORDER — SODIUM CHLORIDE 9 MG/ML
10 INJECTION INTRAMUSCULAR; INTRAVENOUS; SUBCUTANEOUS AS NEEDED
Status: ACTIVE | OUTPATIENT
Start: 2017-08-31 | End: 2017-09-01

## 2017-08-31 RX ADMIN — Medication 10 ML: at 09:07

## 2017-08-31 RX ADMIN — Medication 500 UNITS: at 09:07

## 2017-08-31 RX ADMIN — SODIUM CHLORIDE 10 ML: 9 INJECTION INTRAMUSCULAR; INTRAVENOUS; SUBCUTANEOUS at 09:06

## 2017-08-31 NOTE — PROGRESS NOTES
Chief Complaint   Patient presents with    Lymphoma     f/u     1. Have you been to the ER, urgent care clinic since your last visit? Hospitalized since your last visit? No    2. Have you seen or consulted any other health care providers outside of the [address] [place] [address] since your last visit? Include any pap smears or colon screening.  No

## 2017-08-31 NOTE — MR AVS SNAPSHOT
Visit Information Date & Time Provider Department Dept. Phone Encounter #  
 8/31/2017  8:15 AM Leslie Yancey NP [address] [facility] [address] at [address] [facility] [id number] Follow-up Instructions Return in 12 weeks (on 11/23/2017) for justina Hartman flush - lymphoma f/u. Your Appointments 11/30/2017 10:45 AM  
ESTABLISHED PATIENT with Leslie Yancey NP  
[address] [facility] at [facility] Appt Note: justina Hartman flush - lymphoma f/u  
 [address] [facility], [address] [facility] [address] [phone]  
  
   
 [address] [facility], [address] [place] [address] Upcoming Health Maintenance Date Due DTaP/Tdap/Td series (1 - Tdap) 7/5/1964 ZOSTER VACCINE AGE 60> 5/5/2003 GLAUCOMA SCREENING Q2Y 7/5/2008 Pneumococcal 65+ High/Highest Risk (1 of 2 - PCV13) 7/5/2008 MEDICARE YEARLY EXAM 7/5/2008 INFLUENZA AGE 9 TO ADULT 8/1/2017 FOBT Q 1 YEAR AGE 50-75 11/11/2017 Allergies as of 8/31/2017  Review Complete On: 8/31/2017 By: Leslie Yancey NP No Known Allergies Current Immunizations  Reviewed on 8/3/2017 No immunizations on file. Not reviewed this visit Vitals BP Pulse Temp Resp Height(growth percentile) Weight(growth percentile) 146/87 (BP 1 Location: Left arm, BP Patient Position: Sitting) 68 96.3 °F (35.7 °C) (Oral) 18 5' 11\" (1.803 m) 237 lb 9.6 oz (107.8 kg) SpO2 BMI Smoking Status 96% 33.14 kg/m2 Former Smoker Vitals History BMI and BSA Data Body Mass Index Body Surface Area  
 33.14 kg/m 2 2.32 m 2 Preferred Pharmacy Pharmacy Name Phone [facility], [address] [phone] Your Updated Medication List  
  
   
This list is accurate as of: 8/31/17  8:48 AM.  Always use your most recent med list.  
  
  
  
  
 apixaban 5 mg tablet Commonly known as:  Ash Guardian Take 1 Tab by mouth two (2) times a day. lidocaine-prilocaine topical cream  
Commonly known as:  EMLA Apply  to affected area as needed for Pain (Apply 30-60 min. prior to having your port accessed). Follow-up Instructions Return in 12 weeks (on 11/23/2017) for justina Hartman flush - lymphoma f/u. To-Do List   
 08/31/2017 10:00 AM  
  Appointment with [address] at [place] [address] ([phone])  
  
 09/28/2017 10:00 AM  
  Appointment with [address] at [facility] 73 ([phone])  
  
 10/26/2017 10:00 AM  
  Appointment with [address] at [place] [address] ([phone])  
  
 11/30/2017 10:00 AM  
  Appointment with [address] at [place] [address] ([phone]) Introducing [facility] & HEALTH SERVICES! Dear Julienne Wood: Thank you for requesting a [employer] account. Our records indicate that you already have an active [employer] account. You can access your account anytime at [email or web address] Did you know that you can access your hospital and ER discharge instructions at any time in Restoriust? You can also review all of your test results from your hospital stay or ER visit. Additional Information If you have questions, please visit the Frequently Asked Questions section of the [employer] website at [email or web address] Remember, [employer] is NOT to be used for urgent needs. For medical emergencies, dial 911. Now available from your iPhone and Android! Please provide this summary of care documentation to your next provider. Your primary care clinician is listed as Real Elizondo. If you have any questions after today's visit, please call [phone].

## 2017-08-31 NOTE — PROGRESS NOTES
[address] SHORT VISIT NOTE    5896  Pt arrived to [facility] ambulatory and in no distress for labs and port flush. Denies any new complaints. Blood pressure 138/87, pulse 67, temperature 97.5 °F (36.4 °C), resp. rate 16. Right chest port accessed with 0.75in guallpa. Positive blood return noted and labs drawn. Port flushed and de-accessed per protocol. Recent Results (from the past 12 hour(s))   CBC WITH AUTOMATED DIFF    Collection Time: 08/31/17  9:04 AM   Result Value Ref Range    WBC 4.6 4.1 - 11.1 K/uL    RBC 3.91 (L) 4.10 - 5.70 M/uL    HGB 11.9 (L) 12.1 - 17.0 g/dL    HCT 35.9 (L) 36.6 - 50.3 %    MCV 91.8 80.0 - 99.0 FL    MCH 30.4 26.0 - 34.0 PG    MCHC 33.1 30.0 - 36.5 g/dL    RDW 13.1 11.5 - 14.5 %    PLATELET 003 991 - 930 K/uL    NEUTROPHILS PENDING %    LYMPHOCYTES PENDING %    MONOCYTES PENDING %    EOSINOPHILS PENDING %    BASOPHILS PENDING %    ABS. NEUTROPHILS PENDING K/UL    ABS. LYMPHOCYTES PENDING K/UL    ABS. MONOCYTES PENDING K/UL    ABS. EOSINOPHILS PENDING K/UL    ABS. BASOPHILS PENDING K/UL    DF PENDING        0323  Discharged home ambulatory and in no distress. Tolerated treatment well. Next appointment 9/28/17 at 1000.

## 2017-09-28 ENCOUNTER — HOSPITAL ENCOUNTER (OUTPATIENT)
Dept: INFUSION THERAPY | Age: 72
Discharge: HOME | End: 2017-09-28
Payer: MEDICARE

## 2017-09-28 VITALS
DIASTOLIC BLOOD PRESSURE: 88 MMHG | OXYGEN SATURATION: 95 % | RESPIRATION RATE: 18 BRPM | TEMPERATURE: 98.4 F | SYSTOLIC BLOOD PRESSURE: 143 MMHG | HEART RATE: 63 BPM

## 2017-09-28 PROCEDURE — 74011250636 HC RX REV CODE- 250/636: Performed by: INTERNAL MEDICINE

## 2017-09-28 PROCEDURE — 77030012965 HC NDL HUBR BBMI -A

## 2017-09-28 PROCEDURE — 74011000250 HC RX REV CODE- 250: Performed by: INTERNAL MEDICINE

## 2017-09-28 PROCEDURE — 96523 IRRIG DRUG DELIVERY DEVICE: CPT

## 2017-09-28 RX ORDER — SODIUM CHLORIDE 0.9 % (FLUSH) 0.9 %
10 SYRINGE (ML) INJECTION AS NEEDED
Status: ACTIVE | OUTPATIENT
Start: 2017-09-28 | End: 2017-09-29

## 2017-09-28 RX ORDER — HEPARIN 100 UNIT/ML
500 SYRINGE INTRAVENOUS AS NEEDED
Status: ACTIVE | OUTPATIENT
Start: 2017-09-28 | End: 2017-09-29

## 2017-09-28 RX ORDER — SODIUM CHLORIDE 9 MG/ML
10 INJECTION INTRAMUSCULAR; INTRAVENOUS; SUBCUTANEOUS AS NEEDED
Status: ACTIVE | OUTPATIENT
Start: 2017-09-28 | End: 2017-09-29

## 2017-09-28 RX ADMIN — SODIUM CHLORIDE 10 ML: 9 INJECTION INTRAMUSCULAR; INTRAVENOUS; SUBCUTANEOUS at 10:10

## 2017-09-28 RX ADMIN — Medication 500 UNITS: at 10:15

## 2017-09-28 NOTE — PROGRESS NOTES
[facility] VISIT NOTE    1010  Pt arrived at [facility] ambulatory and in no distress for port flush. Assessment completed, pt without complaints. Right chest port accessed with . 75 in guallpa with no difficulty. Positive blood return noted. Patient Vitals for the past 12 hrs:   Temp Pulse Resp BP SpO2   09/28/17 1010 98.4 °F (36.9 °C) 63 18 143/88 95 %     Port de-accessed and flushed per protocol. Positive blood return noted. 1020  D/C'd from [facility] ambulatory and in no distress accompanied by self.  Next appointment is10/26/17 at 1100am.

## 2017-10-26 ENCOUNTER — HOSPITAL ENCOUNTER (OUTPATIENT)
Dept: INFUSION THERAPY | Age: 72
Discharge: HOME | End: 2017-10-26
Payer: MEDICARE

## 2017-10-26 VITALS
RESPIRATION RATE: 18 BRPM | TEMPERATURE: 98.6 F | SYSTOLIC BLOOD PRESSURE: 147 MMHG | DIASTOLIC BLOOD PRESSURE: 96 MMHG | HEART RATE: 70 BPM

## 2017-10-26 PROCEDURE — 74011000250 HC RX REV CODE- 250: Performed by: INTERNAL MEDICINE

## 2017-10-26 PROCEDURE — 77030012965 HC NDL HUBR BBMI -A

## 2017-10-26 PROCEDURE — 96523 IRRIG DRUG DELIVERY DEVICE: CPT

## 2017-10-26 PROCEDURE — 74011250636 HC RX REV CODE- 250/636: Performed by: INTERNAL MEDICINE

## 2017-10-26 RX ORDER — SODIUM CHLORIDE 9 MG/ML
10 INJECTION INTRAMUSCULAR; INTRAVENOUS; SUBCUTANEOUS AS NEEDED
Status: ACTIVE | OUTPATIENT
Start: 2017-10-26 | End: 2017-10-27

## 2017-10-26 RX ORDER — HEPARIN 100 UNIT/ML
500 SYRINGE INTRAVENOUS AS NEEDED
Status: ACTIVE | OUTPATIENT
Start: 2017-10-26 | End: 2017-10-27

## 2017-10-26 RX ORDER — SODIUM CHLORIDE 0.9 % (FLUSH) 0.9 %
10-40 SYRINGE (ML) INJECTION AS NEEDED
Status: ACTIVE | OUTPATIENT
Start: 2017-10-26 | End: 2017-10-27

## 2017-10-26 RX ADMIN — Medication 10 ML: at 09:38

## 2017-10-26 RX ADMIN — Medication 500 UNITS: at 09:38

## 2017-10-26 RX ADMIN — SODIUM CHLORIDE 10 ML: 9 INJECTION, SOLUTION INTRAMUSCULAR; INTRAVENOUS; SUBCUTANEOUS at 09:38

## 2017-10-26 NOTE — PROGRESS NOTES
[facility]fusion [facility] Progress Note    0930 Pt admit to [facility] for port flush ambulatory in stable condition. Assessment completed. No new concerns voiced. Port accessed with positive blood return. Port flushed, heparinized and de-accessed. Visit Vitals    BP (!) 147/96 (BP 1 Location: Right arm, BP Patient Position: Sitting)    Pulse 70    Temp 98.6 °F (37 °C)    Resp 18           0940 Pt tolerated treatment well. D/c home ambulatory in no distress. Pt aware of next appointment scheduled for 11/30/17.

## 2017-11-07 ENCOUNTER — TELEPHONE (OUTPATIENT)
Dept: ONCOLOGY | Age: 72
End: 2017-11-07

## 2017-11-07 DIAGNOSIS — I82.403: ICD-10-CM

## 2017-11-07 DIAGNOSIS — I26.99 OTHER ACUTE PULMONARY EMBOLISM WITHOUT ACUTE COR PULMONALE (HCC): ICD-10-CM

## 2017-11-07 NOTE — TELEPHONE ENCOUNTER
Patient came into office and stated that he went to the pharmacy to get his prescription and it used to be $24.00 and now they are saying it will be $381. Informed patient that nurses were with patients and would have to call him. Patient stated that was fine.  # [phone]

## 2017-11-07 NOTE — TELEPHONE ENCOUNTER
GUEVARAE Rubysophic at [facility] 88  [phone]    11/07/17- Spoke to pharmacist at [facility], they stated there was no reason why patient's prescription price increased and we would need to call his insurance company for more information. Voicemail left for patient, we need the phone number on the back of his prescription card, requested a return call. 11/08/17- Patient stopped by the office, he received a notification in the mail saying his insurance prefers that he receive prescriptions through [address] [place] [address] home delivery or a local [facility] [address]. 90 day supply for eliquis sent electronically to [facility]  mail order.

## 2017-11-08 ENCOUNTER — TELEPHONE (OUTPATIENT)
Dept: ONCOLOGY | Age: 72
End: 2017-11-08

## 2017-11-08 DIAGNOSIS — I26.99 OTHER ACUTE PULMONARY EMBOLISM WITHOUT ACUTE COR PULMONALE (HCC): ICD-10-CM

## 2017-11-08 DIAGNOSIS — I82.403: ICD-10-CM

## 2017-11-08 NOTE — TELEPHONE ENCOUNTER
Call placed to [address] and spoke to CRISTOBAL to inquire how much a two week supply would cost the patient. He stated this would cost the patient $177.94 but that the prescription is name brand. When I inquired about the cost for a generic equivalent, Chintan stated that there is currently not a generic equivalent for this medication yet. Called patient and notified him of the information above. Patient stated that he can get the medication for free at [facility] for free since it's a [facility]. He requested to have the prescription sent here and he will go  today but if he can't get it for free, he is fine with paying the $177.94 at [address].

## 2017-11-08 NOTE — TELEPHONE ENCOUNTER
[facility] at [place]  [phone]    Eliquis prescription e-scribed to [facility] per patient request.

## 2017-11-08 NOTE — TELEPHONE ENCOUNTER
Patient notified of prescription being sent to [facility]. This medication will be free of charge for the patient. Katherine Gomez thanked the team for all the [employer] and he is on the way to get it now.

## 2017-11-08 NOTE — TELEPHONE ENCOUNTER
Call placed to [facility] and spoke to Cecilia Harrington to inquire about a local [address] that the patient can  a partial fill of his Eliquis until he gets his 90 day supply in the mail (this is a new change with his [address] benefits). Cecilia Harrington could not locate a local  base/pharmacy. Communicated this to Dr. Natanael Correa staff.

## 2017-11-21 DIAGNOSIS — C83.18 MANTLE CELL LYMPHOMA OF LYMPH NODES OF MULTIPLE REGIONS (HCC): Primary | ICD-10-CM

## 2017-11-23 ENCOUNTER — APPOINTMENT (OUTPATIENT)
Dept: INFUSION THERAPY | Age: 72
End: 2017-11-23
Payer: MEDICARE

## 2017-11-24 ENCOUNTER — HOSPITAL ENCOUNTER (OUTPATIENT)
Dept: CT IMAGING | Age: 72
Discharge: HOME | End: 2017-11-24
Attending: INTERNAL MEDICINE
Payer: MEDICARE

## 2017-11-24 DIAGNOSIS — C83.18 MANTLE CELL LYMPHOMA OF LYMPH NODES OF MULTIPLE REGIONS (HCC): ICD-10-CM

## 2017-11-24 LAB — CREAT BLD-MCNC: 1.1 MG/DL (ref 0.6–1.3)

## 2017-11-24 PROCEDURE — 74011250636 HC RX REV CODE- 250/636

## 2017-11-24 PROCEDURE — 74011636320 HC RX REV CODE- 636/320: Performed by: RADIOLOGY

## 2017-11-24 PROCEDURE — 74177 CT ABD & PELVIS W/CONTRAST: CPT

## 2017-11-24 PROCEDURE — 70491 CT SOFT TISSUE NECK W/DYE: CPT

## 2017-11-24 PROCEDURE — 82565 ASSAY OF CREATININE: CPT

## 2017-11-24 RX ORDER — HEPARIN 100 UNIT/ML
SYRINGE INTRAVENOUS
Status: COMPLETED
Start: 2017-11-24 | End: 2017-11-24

## 2017-11-24 RX ORDER — HEPARIN 100 UNIT/ML
500 SYRINGE INTRAVENOUS
Status: ACTIVE | OUTPATIENT
Start: 2017-11-24 | End: 2017-11-24

## 2017-11-24 RX ADMIN — IOPAMIDOL 93 ML: 755 INJECTION, SOLUTION INTRAVENOUS at 08:50

## 2017-11-24 RX ADMIN — SODIUM CHLORIDE, PRESERVATIVE FREE 500 UNITS: 5 INJECTION INTRAVENOUS at 09:00

## 2017-11-30 ENCOUNTER — OFFICE VISIT (OUTPATIENT)
Dept: ONCOLOGY | Age: 72
End: 2017-11-30

## 2017-11-30 ENCOUNTER — HOSPITAL ENCOUNTER (OUTPATIENT)
Dept: INFUSION THERAPY | Age: 72
Discharge: HOME | End: 2017-11-30
Payer: MEDICARE

## 2017-11-30 VITALS
HEIGHT: 71 IN | DIASTOLIC BLOOD PRESSURE: 89 MMHG | SYSTOLIC BLOOD PRESSURE: 153 MMHG | TEMPERATURE: 97.2 F | WEIGHT: 243 LBS | OXYGEN SATURATION: 100 % | BODY MASS INDEX: 34.02 KG/M2 | HEART RATE: 65 BPM | RESPIRATION RATE: 18 BRPM

## 2017-11-30 DIAGNOSIS — R60.0 EDEMA, LOWER EXTREMITY: ICD-10-CM

## 2017-11-30 DIAGNOSIS — D64.9 ANEMIA, UNSPECIFIED TYPE: ICD-10-CM

## 2017-11-30 DIAGNOSIS — I82.403 DEEP VENOUS EMBOLISM AND THROMBOSIS OF BOTH LOWER EXTREMITIES (HCC): ICD-10-CM

## 2017-11-30 DIAGNOSIS — C83.18 MANTLE CELL LYMPHOMA OF LYMPH NODES OF MULTIPLE REGIONS (HCC): Primary | ICD-10-CM

## 2017-11-30 LAB
ALBUMIN SERPL-MCNC: 3.5 G/DL (ref 3.5–5)
ALBUMIN/GLOB SERPL: 1.1 {RATIO} (ref 1.1–2.2)
ALP SERPL-CCNC: 97 U/L (ref 45–117)
ALT SERPL-CCNC: 21 U/L (ref 12–78)
ANION GAP SERPL CALC-SCNC: 7 MMOL/L (ref 5–15)
AST SERPL-CCNC: 15 U/L (ref 15–37)
BASOPHILS # BLD: 0 K/UL (ref 0–0.1)
BASOPHILS NFR BLD: 1 % (ref 0–1)
BILIRUB SERPL-MCNC: 0.4 MG/DL (ref 0.2–1)
BUN SERPL-MCNC: 17 MG/DL (ref 6–20)
BUN/CREAT SERPL: 15 (ref 12–20)
CALCIUM SERPL-MCNC: 9 MG/DL (ref 8.5–10.1)
CHLORIDE SERPL-SCNC: 106 MMOL/L (ref 97–108)
CO2 SERPL-SCNC: 30 MMOL/L (ref 21–32)
CREAT SERPL-MCNC: 1.16 MG/DL (ref 0.7–1.3)
EOSINOPHIL # BLD: 0.2 K/UL (ref 0–0.4)
EOSINOPHIL NFR BLD: 5 % (ref 0–7)
ERYTHROCYTE [DISTWIDTH] IN BLOOD BY AUTOMATED COUNT: 12.9 % (ref 11.5–14.5)
GLOBULIN SER CALC-MCNC: 3.3 G/DL (ref 2–4)
GLUCOSE SERPL-MCNC: 98 MG/DL (ref 65–100)
HCT VFR BLD AUTO: 34.5 % (ref 36.6–50.3)
HGB BLD-MCNC: 11.8 G/DL (ref 12.1–17)
LDH SERPL L TO P-CCNC: 169 U/L (ref 85–241)
LYMPHOCYTES # BLD: 0.8 K/UL (ref 0.8–3.5)
LYMPHOCYTES NFR BLD: 21 % (ref 12–49)
MCH RBC QN AUTO: 30.7 PG (ref 26–34)
MCHC RBC AUTO-ENTMCNC: 34.2 G/DL (ref 30–36.5)
MCV RBC AUTO: 89.8 FL (ref 80–99)
MONOCYTES # BLD: 0.4 K/UL (ref 0–1)
MONOCYTES NFR BLD: 10 % (ref 5–13)
NEUTS SEG # BLD: 2.6 K/UL (ref 1.8–8)
NEUTS SEG NFR BLD: 63 % (ref 32–75)
PLATELET # BLD AUTO: 169 K/UL (ref 150–400)
POTASSIUM SERPL-SCNC: 4.4 MMOL/L (ref 3.5–5.1)
PROT SERPL-MCNC: 6.8 G/DL (ref 6.4–8.2)
RBC # BLD AUTO: 3.84 M/UL (ref 4.1–5.7)
SODIUM SERPL-SCNC: 143 MMOL/L (ref 136–145)
WBC # BLD AUTO: 4 K/UL (ref 4.1–11.1)

## 2017-11-30 PROCEDURE — 77030012965 HC NDL HUBR BBMI -A

## 2017-11-30 PROCEDURE — 74011250636 HC RX REV CODE- 250/636: Performed by: INTERNAL MEDICINE

## 2017-11-30 PROCEDURE — 80053 COMPREHEN METABOLIC PANEL: CPT | Performed by: INTERNAL MEDICINE

## 2017-11-30 PROCEDURE — 74011000250 HC RX REV CODE- 250: Performed by: INTERNAL MEDICINE

## 2017-11-30 PROCEDURE — 36415 COLL VENOUS BLD VENIPUNCTURE: CPT | Performed by: INTERNAL MEDICINE

## 2017-11-30 PROCEDURE — 85025 COMPLETE CBC W/AUTO DIFF WBC: CPT | Performed by: INTERNAL MEDICINE

## 2017-11-30 PROCEDURE — 83615 LACTATE (LD) (LDH) ENZYME: CPT | Performed by: INTERNAL MEDICINE

## 2017-11-30 PROCEDURE — 36591 DRAW BLOOD OFF VENOUS DEVICE: CPT

## 2017-11-30 RX ORDER — SODIUM CHLORIDE 0.9 % (FLUSH) 0.9 %
10 SYRINGE (ML) INJECTION AS NEEDED
Status: ACTIVE | OUTPATIENT
Start: 2017-11-30 | End: 2017-12-01

## 2017-11-30 RX ORDER — HEPARIN 100 UNIT/ML
500 SYRINGE INTRAVENOUS AS NEEDED
Status: ACTIVE | OUTPATIENT
Start: 2017-11-30 | End: 2017-12-01

## 2017-11-30 RX ORDER — SODIUM CHLORIDE 9 MG/ML
10 INJECTION INTRAMUSCULAR; INTRAVENOUS; SUBCUTANEOUS AS NEEDED
Status: ACTIVE | OUTPATIENT
Start: 2017-11-30 | End: 2017-12-01

## 2017-11-30 RX ADMIN — SODIUM CHLORIDE 10 ML: 9 INJECTION, SOLUTION INTRAMUSCULAR; INTRAVENOUS; SUBCUTANEOUS at 10:10

## 2017-11-30 RX ADMIN — Medication 500 UNITS: at 10:15

## 2017-11-30 RX ADMIN — Medication 10 ML: at 10:15

## 2017-11-30 NOTE — PROGRESS NOTES
Cancer [facility] at [facility] 88  [address] [facility], [address] [place] [address]  W: [phone]  F: [phone]      Reason for Visit:   Rambo Onofre is a 74 y.o. male who is seen for follow up of lymphoma. Treatment History:   · CTA Chest 11/11/2016: PE in left lower lobe pulmonary arteries, extensive adneopathy  · CT A/P 11/12/2016: Cirrhosis, massive splenomegaly, ascites, massive lymphadenopathy  · US guided axillary node biopsy 11/14/2016: CD5+ B-cell non-hodgkin lymphoma, phenotype most consistent with mantle cell lymphoma. FISH with t(11;14)  · PET-CT 11/23/2016: Marked splenomegaly with increased metabolic activity consistent with lymphoma. Bilateral cervical and axillary adenopathy. Mediastinal and right hilar and left diaphragmatic adenopathy. Bilateral pelvic and inguinal adenopathy. Multiple small mesenteric and retroperitoneal nodes with low grade or no activity. · BMBx 11/29/2016: Hypercellular marrow involved by mantle cell lymphoma  · Stage IV Mantle Cell Lymphoma  · Palliative chemotherapy with Rituximab and Bendamustine x6 cycles from 12/8/2016 to 5/12/17  · Bone marrow biopsy 5/23/2017: no evidence of lymphoma  · PET/CT 6/7/2017: There has been marked improvement in the adenopathy in the cervical region, axilla, mediastinum, abdomen and pelvis which now demonstrate no increased metabolic activity. There has been marked decrease in the spleen which now demonstrates no increased metabolic activity. History of Present Illness:   Here today for follow up. He states he is feeling well. No pain. Energy good. Some mild intermittent swelling to legs but nothing bothersome. Denies fevers, chills, night sweats, weight loss, adenopathy. No new complaints today. Reports compliance with apixaban, denies bleeding. He is unaccompanied today.      PAST HISTORY: The following sections were reviewed and updated in the EMR as appropriate: PMH, SH, FH, Medications, Allergies. No Known Allergies     Review of Systems: A complete review of systems was obtained, reviewed, and scanned into the EMR. Pertinent findings reviewed above. Physical Exam:     Visit Vitals    /89 (BP 1 Location: Left arm, BP Patient Position: Sitting)  Comment: .  Pulse 65    Temp 97.2 °F (36.2 °C) (Temporal)    Resp 18    Ht 5' 11\" (1.803 m)    Wt 243 lb (110.2 kg)    SpO2 100%    BMI 33.89 kg/m2     ECOG PS: 0  General: No distress  Eyes: PERRLA, anicteric sclerae  HENT: Atraumatic, OP clear  Neck: Supple  Lymphatic: No cervical, supraclavicular, or inguinal adenopathy  Respiratory: CTAB, normal respiratory effort  CV: Normal rate, regular rhythm, no murmurs. Bilateral LE edema- minimal, equal, non-pitting  GI: Soft, nontender, nondistended, no masses, no hepatomegaly, no splenomegaly  MS: Normal gait and station. Digits without clubbing or cyanosis. Skin: No rashes, ecchymoses, or petechiae. Normal temperature, turgor, and texture. Psych: Alert, oriented, appropriate affect, normal judgment/insight    Results:     Lab Results   Component Value Date/Time    WBC 4.0 11/30/2017 10:19 AM    HGB 11.8 11/30/2017 10:19 AM    HCT 34.5 11/30/2017 10:19 AM    PLATELET 951 40/96/7563 10:19 AM    MCV 89.8 11/30/2017 10:19 AM    ABS.  NEUTROPHILS 2.6 11/30/2017 10:19 AM    Hemoglobin (POC) 10.9 01/12/2017 08:02 AM    Hematocrit (POC) 32 01/12/2017 08:02 AM     Lab Results   Component Value Date/Time    Sodium 143 11/30/2017 10:19 AM    Potassium 4.4 11/30/2017 10:19 AM    Chloride 106 11/30/2017 10:19 AM    CO2 30 11/30/2017 10:19 AM    Glucose 98 11/30/2017 10:19 AM    BUN 17 11/30/2017 10:19 AM    Creatinine 1.16 11/30/2017 10:19 AM    GFR est AA >60 11/30/2017 10:19 AM    GFR est non-AA >60 11/30/2017 10:19 AM    Calcium 9.0 11/30/2017 10:19 AM    Sodium (POC) 143 01/12/2017 08:02 AM    Potassium (POC) 4.0 01/12/2017 08:02 AM    Chloride (POC) 104 01/12/2017 08:02 AM    Glucose (POC) 156 01/12/2017 08:02 AM    BUN (POC) 23 01/12/2017 08:02 AM    Creatinine (POC) 1.1 11/24/2017 08:36 AM    Calcium, ionized (POC) 1.19 01/12/2017 08:02 AM     Lab Results   Component Value Date/Time    Bilirubin, total 0.4 11/30/2017 10:19 AM    ALT (SGPT) 21 11/30/2017 10:19 AM    AST (SGOT) 15 11/30/2017 10:19 AM    Alk. phosphatase 97 11/30/2017 10:19 AM    Protein, total 6.8 11/30/2017 10:19 AM    Albumin 3.5 11/30/2017 10:19 AM    Globulin 3.3 11/30/2017 10:19 AM     Lab Results   Component Value Date/Time    Reticulocyte count 1.8 11/10/2016 02:00 PM    Iron % saturation 11 11/10/2016 02:00 PM    TIBC 240 11/10/2016 02:00 PM    Ferritin 100 11/10/2016 02:00 PM    Vitamin B12 415 11/10/2016 02:00 PM    Folate 15.6 11/10/2016 02:00 PM    Haptoglobin 186 11/11/2016 12:04 PM     11/30/2017 10:19 AM    Beta-2 Microglobulin, serum 4.4 11/15/2016 05:21 AM    TSH 4.06 11/10/2016 11:21 AM    Hep C  virus Ab Interp. NONREACTIVE 08/02/2016 08:54 AM     Lab Results   Component Value Date/Time    INR 1.1 11/11/2016 12:04 PM    aPTT 31.3 11/11/2016 12:04 PM    Fibrinogen 215 11/11/2016 12:04 PM       HepB/C negative  TTE 11/10/2016: EF 60%    Bone marrow biopsy 11/29/2016: Hypercellular marrow involved by mantle cell lymphoma    Bone marrow biopsy 5/23/17: no evidence of lymphoma    PET 6/7/17: no abnormal hypermetabolism    CT N/C/A/P 11/24/2017: No evidence of lymphadenopathy in the neck, chest, abdomen, or pelvis. Persistent mild splenomegaly. Small pulmonary nodules along the right minor fissure were likely obscured by atelectasis on prior studies, if present. 5 mm left upper lobe pulmonary nodule is unchanged. Cholecystolithiasis. Assessment:   1) Mantle cell lymphoma  Stage IV  His MIPI score was 6.24, which is High Risk and cooresponds to a median survival of 37 months and 5-year OS of 20%. He has completed chemotherapy with R-Bendamustine for 6 cycles.   He had an excellent response to therapy based on repeat bone marrow biopsy and PET/CT. He has decided against transplant or maintenance rituximab. He continues feeling well. No evidence of recurrence on recent labs, history, exam or imaging. CT stable, discussed with patient today. We will continue with surveillance and see him back in about 12 weeks with port flush. CT scan due in 6 months, around 5/2017.    2) DVT, PE  Diagnosed 11/11/2016. Malignancy associated. Previously on lovenox. Now on apixaban BID, continue. 3) Anemia  Mild. Stable. Monitor. 4) Thrombocytopenia  Resolved today. Secondary to splenomegaly/cirrhosis. Monitor. 5) Port care  Continue port flushes every 4-6 weeks.      Plan:     · Continue apixiban 5mg PO BID  · Port flush every 4-6 weeks  · Labs every 12 weeks: CBC, CMP, LD  · CT every 6 months, due 5/2018  · Return to clinic in 12 weeks       Signed By: David Kramer MD

## 2017-11-30 NOTE — PROGRESS NOTES
[facility] VISIT NOTE    1005: Pt arrived at [facility] ambulatory and in no distress for port flush+labs. Assessment completed, pt without any new complaints or changes at present. 1010: R chest port accessed with 0.75 in guallpa with no difficulty. Positive blood return noted and labs drawn. Patient Vitals for the past 12 hrs:   Temp Pulse Resp BP   11/30/17 1004 (P) 98.3 °F (36.8 °C) (P) 63 (P) 18 (P) 147/85      Recent Results (from the past 12 hour(s))   CBC WITH AUTOMATED DIFF    Collection Time: 11/30/17 10:19 AM   Result Value Ref Range    WBC 4.0 (L) 4.1 - 11.1 K/uL    RBC 3.84 (L) 4.10 - 5.70 M/uL    HGB 11.8 (L) 12.1 - 17.0 g/dL    HCT 34.5 (L) 36.6 - 50.3 %    MCV 89.8 80.0 - 99.0 FL    MCH 30.7 26.0 - 34.0 PG    MCHC 34.2 30.0 - 36.5 g/dL    RDW 12.9 11.5 - 14.5 %    PLATELET 675 485 - 021 K/uL    NEUTROPHILS 63 32 - 75 %    LYMPHOCYTES 21 12 - 49 %    MONOCYTES 10 5 - 13 %    EOSINOPHILS 5 0 - 7 %    BASOPHILS 1 0 - 1 %    ABS. NEUTROPHILS 2.6 1.8 - 8.0 K/UL    ABS. LYMPHOCYTES 0.8 0.8 - 3.5 K/UL    ABS. MONOCYTES 0.4 0.0 - 1.0 K/UL    ABS. EOSINOPHILS 0.2 0.0 - 0.4 K/UL    ABS. BASOPHILS 0.0 0.0 - 0.1 K/UL   LD    Collection Time: 11/30/17 10:19 AM   Result Value Ref Range     85 - 191 U/L   METABOLIC PANEL, COMPREHENSIVE    Collection Time: 11/30/17 10:19 AM   Result Value Ref Range    Sodium 143 136 - 145 mmol/L    Potassium 4.4 3.5 - 5.1 mmol/L    Chloride 106 97 - 108 mmol/L    CO2 30 21 - 32 mmol/L    Anion gap 7 5 - 15 mmol/L    Glucose 98 65 - 100 mg/dL    BUN 17 6 - 20 MG/DL    Creatinine 1.16 0.70 - 1.30 MG/DL    BUN/Creatinine ratio 15 12 - 20      GFR est AA >60 >60 ml/min/1.73m2    GFR est non-AA >60 >60 ml/min/1.73m2    Calcium 9.0 8.5 - 10.1 MG/DL    Bilirubin, total 0.4 0.2 - 1.0 MG/DL    ALT (SGPT) 21 12 - 78 U/L    AST (SGOT) 15 15 - 37 U/L    Alk.  phosphatase 97 45 - 117 U/L    Protein, total 6.8 6.4 - 8.2 g/dL    Albumin 3.5 3.5 - 5.0 g/dL    Globulin 3.3 2.0 - 4.0 g/dL A-G Ratio 1.1 1.1 - 2.2         Port de-accessed and flushed per protocol. Positive blood return noted. 1020: D/C'd from [facility] ambulatory and in no distress.  Next appointment is 12/28 @ 10:00am.

## 2017-11-30 NOTE — MR AVS SNAPSHOT
Visit Information Date & Time Provider Department Dept. Phone Encounter #  
 11/30/2017 10:45 AM Francy Mohan NP [address] [facility] at [address] [place] [address] [id number] Follow-up Instructions Return in about 3 months (around 2/28/2018) for labs/port flush, , Raddin - lymphoma f/u. Upcoming Health Maintenance Date Due DTaP/Tdap/Td series (1 - Tdap) 7/5/1964 ZOSTER VACCINE AGE 60> 5/5/2003 GLAUCOMA SCREENING Q2Y 7/5/2008 Pneumococcal 65+ High/Highest Risk (1 of 2 - PCV13) 7/5/2008 MEDICARE YEARLY EXAM 7/5/2008 FOBT Q 1 YEAR AGE 50-75 11/11/2017 Allergies as of 11/30/2017  Review Complete On: 11/30/2017 By: Francy Mohan NP No Known Allergies Current Immunizations  Reviewed on 10/26/2017 Name Date Influenza Vaccine 10/5/2017 Not reviewed this visit You Were Diagnosed With   
  
 Codes Comments Mantle cell lymphoma of lymph nodes of multiple regions [facility])    -  Primary ICD-10-CM: C83.18 
ICD-9-CM: 200.48 Anemia, unspecified type     ICD-10-CM: D64.9 ICD-9-CM: 285.9 Deep venous embolism and thrombosis of both lower extremities (HCC)     ICD-10-CM: H29.260 ICD-9-CM: 453.40 Edema, lower extremity     ICD-10-CM: R60.0 ICD-9-CM: 497. 3 Vitals BP Pulse Temp Resp Height(growth percentile) 153/89 (BP 1 Location: Left arm, BP Patient Position: Sitting) 65 97.2 °F (36.2 °C) (Temporal) 18 5' 11\" (1.803 m) Weight(growth percentile) SpO2 BMI Smoking Status 243 lb (110.2 kg) 100% 33.89 kg/m2 Former Smoker Vitals History BMI and BSA Data Body Mass Index Body Surface Area  
 33.89 kg/m 2 2.35 m 2 Preferred Pharmacy Pharmacy Name Phone [facility] [address] [phone] Your Updated Medication List  
  
   
This list is accurate as of: 11/30/17 11:27 AM.  Always use your most recent med list.  
  
  
  
  
 apixaban 5 mg tablet Commonly known as:  Aditya Millmont Take 1 Tab by mouth two (2) times a day. lidocaine-prilocaine topical cream  
Commonly known as:  EMLA Apply  to affected area as needed for Pain (Apply 30-60 min. prior to having your port accessed). Follow-up Instructions Return in about 3 months (around 2/28/2018) for labs/port flush, , Raddin - lymphoma f/u. To-Do List   
 12/28/2017 10:00 AM  
  Appointment with [address] [place] [address] at [place] [address] ([phone])  
  
 01/25/2018 10:00 AM  
  Appointment with [address] at [place] [address] ([phone])  
  
 02/22/2018 10:00 AM  
  Appointment with [address] at [place] [address] ([phone]) Introducing [facility] & HEALTH SERVICES! Dear Kenyatta Alvarenga: Thank you for requesting a [employer] account. Our records indicate that you already have an active [employer] account. You can access your account anytime at [email or web address] Did you know that you can access your hospital and ER discharge instructions at any time in [employer]? You can also review all of your test results from your hospital stay or ER visit. Additional Information If you have questions, please visit the Frequently Asked Questions section of the [employer] website at [email or web address] Remember, [employer] is NOT to be used for urgent needs. For medical emergencies, dial 911. Now available from your iPhone and Android! Please provide this summary of care documentation to your next provider. Your primary care clinician is listed as Felix Mendoza. If you have any questions after today's visit, please call [phone].

## 2017-12-28 ENCOUNTER — HOSPITAL ENCOUNTER (OUTPATIENT)
Dept: INFUSION THERAPY | Age: 72
Discharge: HOME | End: 2017-12-28
Payer: MEDICARE

## 2017-12-28 VITALS
SYSTOLIC BLOOD PRESSURE: 143 MMHG | TEMPERATURE: 97 F | DIASTOLIC BLOOD PRESSURE: 92 MMHG | HEART RATE: 68 BPM | RESPIRATION RATE: 18 BRPM

## 2017-12-28 PROCEDURE — 77030012965 HC NDL HUBR BBMI -A

## 2017-12-28 PROCEDURE — 74011000250 HC RX REV CODE- 250: Performed by: INTERNAL MEDICINE

## 2017-12-28 PROCEDURE — 96523 IRRIG DRUG DELIVERY DEVICE: CPT

## 2017-12-28 PROCEDURE — 74011250636 HC RX REV CODE- 250/636: Performed by: INTERNAL MEDICINE

## 2017-12-28 RX ORDER — SODIUM CHLORIDE 0.9 % (FLUSH) 0.9 %
5-10 SYRINGE (ML) INJECTION AS NEEDED
Status: ACTIVE | OUTPATIENT
Start: 2017-12-28 | End: 2017-12-29

## 2017-12-28 RX ORDER — HEPARIN 100 UNIT/ML
500 SYRINGE INTRAVENOUS AS NEEDED
Status: ACTIVE | OUTPATIENT
Start: 2017-12-28 | End: 2017-12-29

## 2017-12-28 RX ORDER — SODIUM CHLORIDE 9 MG/ML
10 INJECTION INTRAMUSCULAR; INTRAVENOUS; SUBCUTANEOUS AS NEEDED
Status: ACTIVE | OUTPATIENT
Start: 2017-12-28 | End: 2017-12-29

## 2017-12-28 RX ADMIN — Medication 500 UNITS: at 09:55

## 2017-12-28 RX ADMIN — Medication 10 ML: at 09:50

## 2017-12-28 RX ADMIN — SODIUM CHLORIDE 10 ML: 9 INJECTION INTRAMUSCULAR; INTRAVENOUS; SUBCUTANEOUS at 09:50

## 2017-12-28 NOTE — PROGRESS NOTES
[facility] VISIT NOTE    6737  Pt arrived at [facility] ambulatory and in no distress for port flush. Assessment completed, pt without complaints    Right chest port accessed with . 75 in guallpa with no difficulty. Positive blood return noted       Tolerated treatment well, no adverse reaction noted. Port de-accessed and flushed per protocol. Positive blood return noted. Patient Vitals for the past 12 hrs:   Temp Pulse Resp BP   12/28/17 0951 97 °F (36.1 °C) 68 18 (!) 143/92     1000  D/C'd from [facility] ambulatory and in no distress accompanied by self. Next appointment is 1/25/18 at 1000.

## 2018-01-25 ENCOUNTER — HOSPITAL ENCOUNTER (OUTPATIENT)
Dept: INFUSION THERAPY | Age: 73
Discharge: HOME | End: 2018-01-25
Payer: MEDICARE

## 2018-01-25 VITALS
OXYGEN SATURATION: 95 % | TEMPERATURE: 98.1 F | RESPIRATION RATE: 18 BRPM | HEART RATE: 65 BPM | DIASTOLIC BLOOD PRESSURE: 90 MMHG | SYSTOLIC BLOOD PRESSURE: 148 MMHG

## 2018-01-25 DIAGNOSIS — I26.99 OTHER ACUTE PULMONARY EMBOLISM WITHOUT ACUTE COR PULMONALE (HCC): ICD-10-CM

## 2018-01-25 DIAGNOSIS — I82.403: ICD-10-CM

## 2018-01-25 DIAGNOSIS — C83.18 MANTLE CELL LYMPHOMA OF LYMPH NODES OF MULTIPLE REGIONS (HCC): ICD-10-CM

## 2018-01-25 PROCEDURE — 77030012965 HC NDL HUBR BBMI -A

## 2018-01-25 PROCEDURE — 96523 IRRIG DRUG DELIVERY DEVICE: CPT

## 2018-01-25 RX ORDER — SODIUM CHLORIDE 0.9 % (FLUSH) 0.9 %
10-40 SYRINGE (ML) INJECTION AS NEEDED
Status: ACTIVE | OUTPATIENT
Start: 2018-01-25 | End: 2018-01-26

## 2018-01-25 RX ORDER — HEPARIN 100 UNIT/ML
500 SYRINGE INTRAVENOUS AS NEEDED
Status: ACTIVE | OUTPATIENT
Start: 2018-01-25 | End: 2018-01-26

## 2018-01-25 RX ORDER — SODIUM CHLORIDE 9 MG/ML
10 INJECTION INTRAMUSCULAR; INTRAVENOUS; SUBCUTANEOUS AS NEEDED
Status: ACTIVE | OUTPATIENT
Start: 2018-01-25 | End: 2018-01-26

## 2018-01-25 RX ORDER — LIDOCAINE AND PRILOCAINE 25; 25 MG/G; MG/G
CREAM TOPICAL AS NEEDED
Qty: 30 G | Refills: 3 | Status: SHIPPED | OUTPATIENT
Start: 2018-01-25 | End: 2018-02-20 | Stop reason: SDUPTHER

## 2018-01-25 NOTE — PROGRESS NOTES
[facility] VISIT NOTE    Pt arrived at [facility] ambulatory and in no distress for port flush. Assessment completed, pt had no complaints. .  Patient Vitals for the past 12 hrs:   Temp Pulse Resp BP SpO2   01/25/18 1006 98.1 °F (36.7 °C) 65 18 148/90 95 %       Right chest port accessed with . 75    in guallpa no difficulty. Positive blood return noted. Tolerated treatment well, no adverse reaction noted. Port de-accessed and flushed per protocol. Positive blood return noted. D/C'd from [facility] ambulatory and in no distress. Next appointment is 2/22/17p at 10:00. Valente Boyle

## 2018-01-25 NOTE — PROGRESS NOTES
Problem: Knowledge Deficit  Goal: *Verbalizes understanding of procedures and medications  Outcome: Progressing Towards Goal  Pt here for port flush.

## 2018-02-20 DIAGNOSIS — C83.18 MANTLE CELL LYMPHOMA OF LYMPH NODES OF MULTIPLE REGIONS (HCC): ICD-10-CM

## 2018-02-20 RX ORDER — LIDOCAINE AND PRILOCAINE 25; 25 MG/G; MG/G
CREAM TOPICAL AS NEEDED
Qty: 90 G | Refills: 3 | Status: SHIPPED | OUTPATIENT
Start: 2018-02-20 | End: 2019-04-18 | Stop reason: SDUPTHER

## 2018-02-22 ENCOUNTER — HOSPITAL ENCOUNTER (OUTPATIENT)
Dept: INFUSION THERAPY | Age: 73
Discharge: HOME | End: 2018-02-22
Payer: MEDICARE

## 2018-02-22 ENCOUNTER — OFFICE VISIT (OUTPATIENT)
Dept: ONCOLOGY | Age: 73
End: 2018-02-22

## 2018-02-22 VITALS
TEMPERATURE: 97.3 F | HEIGHT: 71 IN | HEART RATE: 84 BPM | SYSTOLIC BLOOD PRESSURE: 142 MMHG | BODY MASS INDEX: 35.56 KG/M2 | DIASTOLIC BLOOD PRESSURE: 87 MMHG | OXYGEN SATURATION: 96 % | WEIGHT: 254 LBS | RESPIRATION RATE: 17 BRPM

## 2018-02-22 VITALS
OXYGEN SATURATION: 95 % | SYSTOLIC BLOOD PRESSURE: 133 MMHG | DIASTOLIC BLOOD PRESSURE: 88 MMHG | HEART RATE: 60 BPM | TEMPERATURE: 97.2 F | RESPIRATION RATE: 18 BRPM

## 2018-02-22 DIAGNOSIS — C83.18 MANTLE CELL LYMPHOMA OF LYMPH NODES OF MULTIPLE REGIONS (HCC): Primary | ICD-10-CM

## 2018-02-22 DIAGNOSIS — Z95.828 PORT CATHETER IN PLACE: ICD-10-CM

## 2018-02-22 DIAGNOSIS — R60.0 EDEMA, LOWER EXTREMITY: ICD-10-CM

## 2018-02-22 DIAGNOSIS — I26.99 OTHER PULMONARY EMBOLISM WITHOUT ACUTE COR PULMONALE, UNSPECIFIED CHRONICITY (HCC): ICD-10-CM

## 2018-02-22 DIAGNOSIS — I82.403 DEEP VENOUS EMBOLISM AND THROMBOSIS OF BOTH LOWER EXTREMITIES (HCC): ICD-10-CM

## 2018-02-22 LAB
ALBUMIN SERPL-MCNC: 3.7 G/DL (ref 3.5–5)
ALBUMIN/GLOB SERPL: 1.1 {RATIO} (ref 1.1–2.2)
ALP SERPL-CCNC: 94 U/L (ref 45–117)
ALT SERPL-CCNC: 20 U/L (ref 12–78)
ANION GAP SERPL CALC-SCNC: 8 MMOL/L (ref 5–15)
AST SERPL-CCNC: 17 U/L (ref 15–37)
BASOPHILS # BLD: 0 K/UL (ref 0–0.1)
BASOPHILS NFR BLD: 1 % (ref 0–1)
BILIRUB SERPL-MCNC: 0.6 MG/DL (ref 0.2–1)
BUN SERPL-MCNC: 21 MG/DL (ref 6–20)
BUN/CREAT SERPL: 19 (ref 12–20)
CALCIUM SERPL-MCNC: 9 MG/DL (ref 8.5–10.1)
CHLORIDE SERPL-SCNC: 106 MMOL/L (ref 97–108)
CO2 SERPL-SCNC: 28 MMOL/L (ref 21–32)
CREAT SERPL-MCNC: 1.12 MG/DL (ref 0.7–1.3)
DIFFERENTIAL METHOD BLD: ABNORMAL
EOSINOPHIL # BLD: 0.2 K/UL (ref 0–0.4)
EOSINOPHIL NFR BLD: 5 % (ref 0–7)
ERYTHROCYTE [DISTWIDTH] IN BLOOD BY AUTOMATED COUNT: 12.1 % (ref 11.5–14.5)
GLOBULIN SER CALC-MCNC: 3.3 G/DL (ref 2–4)
GLUCOSE SERPL-MCNC: 90 MG/DL (ref 65–100)
HCT VFR BLD AUTO: 38.1 % (ref 36.6–50.3)
HGB BLD-MCNC: 12.5 G/DL (ref 12.1–17)
IMM GRANULOCYTES # BLD: 0 K/UL (ref 0–0.04)
IMM GRANULOCYTES NFR BLD AUTO: 0 % (ref 0–0.5)
LDH SERPL L TO P-CCNC: 167 U/L (ref 85–241)
LYMPHOCYTES # BLD: 1 K/UL (ref 0.8–3.5)
LYMPHOCYTES NFR BLD: 22 % (ref 12–49)
MCH RBC QN AUTO: 30.6 PG (ref 26–34)
MCHC RBC AUTO-ENTMCNC: 32.8 G/DL (ref 30–36.5)
MCV RBC AUTO: 93.2 FL (ref 80–99)
MONOCYTES # BLD: 0.6 K/UL (ref 0–1)
MONOCYTES NFR BLD: 14 % (ref 5–13)
NEUTS SEG # BLD: 2.8 K/UL (ref 1.8–8)
NEUTS SEG NFR BLD: 59 % (ref 32–75)
NRBC # BLD: 0 K/UL (ref 0–0.01)
NRBC BLD-RTO: 0 PER 100 WBC
PLATELET # BLD AUTO: 173 K/UL (ref 150–400)
PMV BLD AUTO: 9.5 FL (ref 8.9–12.9)
POTASSIUM SERPL-SCNC: 4.2 MMOL/L (ref 3.5–5.1)
PROT SERPL-MCNC: 7 G/DL (ref 6.4–8.2)
RBC # BLD AUTO: 4.09 M/UL (ref 4.1–5.7)
SODIUM SERPL-SCNC: 142 MMOL/L (ref 136–145)
WBC # BLD AUTO: 4.7 K/UL (ref 4.1–11.1)

## 2018-02-22 PROCEDURE — 96523 IRRIG DRUG DELIVERY DEVICE: CPT

## 2018-02-22 PROCEDURE — 85025 COMPLETE CBC W/AUTO DIFF WBC: CPT | Performed by: INTERNAL MEDICINE

## 2018-02-22 PROCEDURE — 80053 COMPREHEN METABOLIC PANEL: CPT | Performed by: INTERNAL MEDICINE

## 2018-02-22 PROCEDURE — 74011000250 HC RX REV CODE- 250: Performed by: INTERNAL MEDICINE

## 2018-02-22 PROCEDURE — 77030012965 HC NDL HUBR BBMI -A

## 2018-02-22 PROCEDURE — 83615 LACTATE (LD) (LDH) ENZYME: CPT | Performed by: INTERNAL MEDICINE

## 2018-02-22 PROCEDURE — 36415 COLL VENOUS BLD VENIPUNCTURE: CPT | Performed by: INTERNAL MEDICINE

## 2018-02-22 PROCEDURE — 74011250636 HC RX REV CODE- 250/636: Performed by: INTERNAL MEDICINE

## 2018-02-22 RX ORDER — SODIUM CHLORIDE 9 MG/ML
10 INJECTION INTRAMUSCULAR; INTRAVENOUS; SUBCUTANEOUS AS NEEDED
Status: ACTIVE | OUTPATIENT
Start: 2018-02-22 | End: 2018-02-23

## 2018-02-22 RX ORDER — HEPARIN 100 UNIT/ML
500 SYRINGE INTRAVENOUS AS NEEDED
Status: ACTIVE | OUTPATIENT
Start: 2018-02-22 | End: 2018-02-23

## 2018-02-22 RX ORDER — SODIUM CHLORIDE 0.9 % (FLUSH) 0.9 %
5-10 SYRINGE (ML) INJECTION AS NEEDED
Status: ACTIVE | OUTPATIENT
Start: 2018-02-22 | End: 2018-02-23

## 2018-02-22 RX ADMIN — Medication 10 ML: at 10:05

## 2018-02-22 RX ADMIN — Medication 500 UNITS: at 10:05

## 2018-02-22 RX ADMIN — SODIUM CHLORIDE 10 ML: 9 INJECTION INTRAMUSCULAR; INTRAVENOUS; SUBCUTANEOUS at 10:05

## 2018-02-22 NOTE — PROGRESS NOTES
[facility] [place] at [place] [address]  [facility], [address] [place] [address]  W: [phone]  F: [phone]      Reason for Visit:   Shanel Galloway is a 74 y.o. male who is seen for follow up of lymphoma. Treatment History:   · CTA Chest 11/11/2016: PE in left lower lobe pulmonary arteries, extensive adneopathy  · CT A/P 11/12/2016: Cirrhosis, massive splenomegaly, ascites, massive lymphadenopathy  · US guided axillary node biopsy 11/14/2016: CD5+ B-cell non-hodgkin lymphoma, phenotype most consistent with mantle cell lymphoma. FISH with t(11;14)  · PET-CT 11/23/2016: Marked splenomegaly with increased metabolic activity consistent with lymphoma. Bilateral cervical and axillary adenopathy. Mediastinal and right hilar and left diaphragmatic adenopathy. Bilateral pelvic and inguinal adenopathy. Multiple small mesenteric and retroperitoneal nodes with low grade or no activity. · BMBx 11/29/2016: Hypercellular marrow involved by mantle cell lymphoma  · Stage IV Mantle Cell Lymphoma  · Palliative chemotherapy with Rituximab and Bendamustine x6 cycles from 12/8/2016 to 5/12/17  · Bone marrow biopsy 5/23/2017: no evidence of lymphoma  · PET/CT 6/7/2017: There has been marked improvement in the adenopathy in the cervical region, axilla, mediastinum, abdomen and pelvis which now demonstrate no increased metabolic activity. There has been marked decrease in the spleen which now demonstrates no increased metabolic activity. History of Present Illness:   Here today for follow up. He reports he continues to feel very well. Leg swelling unchanged and intermittent. No fevers, chills, night sweats, weight loss, adenopathy. No complaints today. Reports compliance with apixaban, denies bleeding or dark stool. He is unaccompanied today. PAST HISTORY: The following sections were reviewed and updated in the EMR as appropriate: PMH, SH, FH, Medications, Allergies.     No Known Allergies Review of Systems: A complete review of systems was obtained, reviewed, and scanned into the EMR. Pertinent findings reviewed above. Physical Exam:     Visit Vitals    /87 (BP 1 Location: Right arm, BP Patient Position: Sitting)    Pulse 84    Temp 97.3 °F (36.3 °C) (Oral)    Resp 17    Ht 5' 11\" (1.803 m)    Wt 254 lb (115.2 kg)    SpO2 96%    BMI 35.43 kg/m2     ECOG PS: 0  General: No distress  Eyes: PERRLA, anicteric sclerae  HENT: Atraumatic, OP clear  Neck: Supple  Lymphatic: No cervical, supraclavicular, or inguinal adenopathy  Respiratory: CTAB, normal respiratory effort  CV: Normal rate, regular rhythm, no murmurs. Bilateral LE edema- minimal, equal, non-pitting  GI: Soft, nontender, nondistended, no masses, no hepatomegaly, no splenomegaly  MS: Normal gait and station. Digits without clubbing or cyanosis. Skin: No rashes, ecchymoses, or petechiae. Normal temperature, turgor, and texture. Psych: Alert, oriented, appropriate affect, normal judgment/insight    Results:     Lab Results   Component Value Date/Time    WBC 4.7 02/22/2018 10:05 AM    HGB 12.5 02/22/2018 10:05 AM    HCT 38.1 02/22/2018 10:05 AM    PLATELET 212 98/59/0601 10:05 AM    MCV 93.2 02/22/2018 10:05 AM    ABS.  NEUTROPHILS 2.8 02/22/2018 10:05 AM    Hemoglobin (POC) 10.9 (L) 01/12/2017 08:02 AM    Hematocrit (POC) 32 (L) 01/12/2017 08:02 AM     Lab Results   Component Value Date/Time    Sodium 142 02/22/2018 10:05 AM    Potassium 4.2 02/22/2018 10:05 AM    Chloride 106 02/22/2018 10:05 AM    CO2 28 02/22/2018 10:05 AM    Glucose 90 02/22/2018 10:05 AM    BUN 21 (H) 02/22/2018 10:05 AM    Creatinine 1.12 02/22/2018 10:05 AM    GFR est AA >60 02/22/2018 10:05 AM    GFR est non-AA >60 02/22/2018 10:05 AM    Calcium 9.0 02/22/2018 10:05 AM    Sodium (POC) 143 01/12/2017 08:02 AM    Potassium (POC) 4.0 01/12/2017 08:02 AM    Chloride (POC) 104 01/12/2017 08:02 AM    Glucose (POC) 156 (H) 01/12/2017 08:02 AM    BUN (POC) 23 (H) 01/12/2017 08:02 AM    Creatinine (POC) 1.1 11/24/2017 08:36 AM    Calcium, ionized (POC) 1.19 01/12/2017 08:02 AM     Lab Results   Component Value Date/Time    Bilirubin, total 0.6 02/22/2018 10:05 AM    ALT (SGPT) 20 02/22/2018 10:05 AM    AST (SGOT) 17 02/22/2018 10:05 AM    Alk. phosphatase 94 02/22/2018 10:05 AM    Protein, total 7.0 02/22/2018 10:05 AM    Albumin 3.7 02/22/2018 10:05 AM    Globulin 3.3 02/22/2018 10:05 AM     Lab Results   Component Value Date/Time    Reticulocyte count 1.8 11/10/2016 02:00 PM    Iron % saturation 11 (L) 11/10/2016 02:00 PM    TIBC 240 (L) 11/10/2016 02:00 PM    Ferritin 100 11/10/2016 02:00 PM    Vitamin B12 415 11/10/2016 02:00 PM    Folate 15.6 11/10/2016 02:00 PM    Haptoglobin 186 11/11/2016 12:04 PM     02/22/2018 10:05 AM    Beta-2 Microglobulin, serum 4.4 (H) 11/15/2016 05:21 AM    TSH 4.06 (H) 11/10/2016 11:21 AM    Hep C  virus Ab Interp. NONREACTIVE 08/02/2016 08:54 AM     Lab Results   Component Value Date/Time    INR 1.1 11/11/2016 12:04 PM    aPTT 31.3 11/11/2016 12:04 PM    Fibrinogen 215 11/11/2016 12:04 PM       HepB/C negative  TTE 11/10/2016: EF 60%    Bone marrow biopsy 11/29/2016: Hypercellular marrow involved by mantle cell lymphoma    Bone marrow biopsy 5/23/17: no evidence of lymphoma    PET 6/7/17: no abnormal hypermetabolism    CT N/C/A/P 11/24/2017: No evidence of lymphadenopathy in the neck, chest, abdomen, or pelvis. Persistent mild splenomegaly. Small pulmonary nodules along the right minor fissure were likely obscured by atelectasis on prior studies, if present. 5 mm left upper lobe pulmonary nodule is unchanged. Cholecystolithiasis. Assessment:   1) Mantle cell lymphoma  Stage IV  His MIPI score was 6.24, which is High Risk and cooresponds to a median survival of 37 months and 5-year OS of 20%. He has completed chemotherapy with R-Bendamustine for 6 cycles.   He had an excellent response to therapy based on repeat bone marrow biopsy and PET/CT. He has decided against transplant or maintenance rituximab. He continues feeling very well and has no evidence of recurrence on recent labs, history, or exam.  We will continue with surveillance and see him back in about 12 weeks with port flush, CT due prior and ordered today. 2) DVT, PE  Diagnosed 11/11/2016. Malignancy associated. Previously on lovenox. Now on apixaban BID, we will continue this long term. 3) Anemia  Resolved on recent labs. Mild. Monitor. 4) Thrombocytopenia  Resolved. Secondary to splenomegaly/cirrhosis. Monitor. 5) Port care  Continue port flushes every 4-6 weeks.      Plan:     · Continue apixiban 5mg PO BID  · Port flush every 4-6 weeks  · Labs every 12 weeks: CBC, CMP, LD  · CT every 6 months, due 5/2018 and ordered today  · Return to clinic in 12 weeks       Signed By: Zohaib Black MD

## 2018-02-22 NOTE — PROGRESS NOTES
Problem: Knowledge Deficit  Goal: *Verbalizes understanding of procedures and medications  Outcome: Progressing Towards Goal  Patient here for [facility]

## 2018-02-22 NOTE — PROGRESS NOTES
[facility] Short Visit Progress Note    5534 Patient admitted to [facility] for [facility] ambulatory in stable condition. Assessment completed. No new concerns voiced. Patient Vitals for the past 12 hrs:   Temp Pulse Resp BP SpO2   02/22/18 0950 97.2 °F (36.2 °C) 60 18 133/88 95 %       RCW port accessed without difficulty with 0.75 guallpa needle; with + blood return; patient port flushed, heparinized, and de-accessed per protocol. Bandaid and guaze placed. 1005 Patient tolerated treatment well. Patient discharged from [facility] ambulatory in no distress.  Patient aware of next appointment scheduled for 3/22/2018 at 10:00am.

## 2018-02-22 NOTE — PROGRESS NOTES
Identified pt with two pt identifiers(name and ). Reviewed record in preparation for visit and have obtained necessary documentation. Chief Complaint   Patient presents with   [address] Maintenance Due   Topic    DTaP/Tdap/Td series (1 - Tdap)    ZOSTER VACCINE AGE 60>     GLAUCOMA SCREENING Q2Y     Pneumococcal 65+ High/Highest Risk (1 of 2 - PCV13)    MEDICARE YEARLY EXAM     FOBT Q 1 YEAR AGE 50-75      HM reviewed w/patient. Depression Screening:  PHQ over the last two weeks 2018   Little interest or pleasure in doing things Not at all   Feeling down, depressed or hopeless Not at all   Total Score PHQ 2 0       Learning Assessment:  Learning Assessment 2018   PRIMARY LEARNER Patient   HIGHEST LEVEL OF EDUCATION - PRIMARY LEARNER  [employer]   BARRIERS PRIMARY LEARNER NONE   CO-LEARNER CAREGIVER No   PRIMARY LANGUAGE ENGLISH   LEARNER PREFERENCE PRIMARY DEMONSTRATION   ANSWERED BY Patient   RELATIONSHIP SELF       Abuse Screening:  Abuse Screening Questionnaire 2018   Do you ever feel afraid of your partner? N   Are you in a relationship with someone who physically or mentally threatens you? N   Is it safe for you to go home? Y       Fall Risk  Fall Risk Assessment, last 12 mths 2018   Able to walk? Yes Yes   Fall in past 12 months? No No       Coordination of Care Questionnaire:  :   1) Have you been to an emergency room, urgent care clinic since your last visit? no   Hospitalized since your last visit? no             2. Have seen or consulted any other health care provider since your last visit? No  If yes, where when, and reason for visit? 3) Do you have an Advanced Directive/ Living Will in place? No  If no, would you like information No    Patient is accompanied by self.

## 2018-03-06 ENCOUNTER — HOSPITAL ENCOUNTER (OUTPATIENT)
Dept: CT IMAGING | Age: 73
Discharge: HOME | End: 2018-03-06
Attending: NURSE PRACTITIONER
Payer: MEDICARE

## 2018-03-06 DIAGNOSIS — C83.18 MANTLE CELL LYMPHOMA OF LYMPH NODES OF MULTIPLE REGIONS (HCC): ICD-10-CM

## 2018-03-06 PROCEDURE — 74177 CT ABD & PELVIS W/CONTRAST: CPT

## 2018-03-06 PROCEDURE — 77030003560 HC NDL HUBR BARD -A

## 2018-03-06 PROCEDURE — 74011636320 HC RX REV CODE- 636/320: Performed by: RADIOLOGY

## 2018-03-06 PROCEDURE — 74011250636 HC RX REV CODE- 250/636

## 2018-03-06 RX ORDER — HEPARIN 100 UNIT/ML
SYRINGE INTRAVENOUS
Status: COMPLETED
Start: 2018-03-06 | End: 2018-03-06

## 2018-03-06 RX ADMIN — IOPAMIDOL 100 ML: 755 INJECTION, SOLUTION INTRAVENOUS at 09:14

## 2018-03-06 RX ADMIN — SODIUM CHLORIDE, PRESERVATIVE FREE 500 UNITS: 5 INJECTION INTRAVENOUS at 09:18

## 2018-03-22 ENCOUNTER — HOSPITAL ENCOUNTER (OUTPATIENT)
Dept: INFUSION THERAPY | Age: 73
Discharge: HOME | End: 2018-03-22
Payer: MEDICARE

## 2018-03-22 VITALS
SYSTOLIC BLOOD PRESSURE: 130 MMHG | HEART RATE: 84 BPM | RESPIRATION RATE: 18 BRPM | TEMPERATURE: 97.1 F | DIASTOLIC BLOOD PRESSURE: 86 MMHG

## 2018-03-22 PROCEDURE — 77030012965 HC NDL HUBR BBMI -A

## 2018-03-22 PROCEDURE — 74011250636 HC RX REV CODE- 250/636: Performed by: INTERNAL MEDICINE

## 2018-03-22 PROCEDURE — 96523 IRRIG DRUG DELIVERY DEVICE: CPT

## 2018-03-22 RX ORDER — HEPARIN 100 UNIT/ML
500 SYRINGE INTRAVENOUS AS NEEDED
Status: ACTIVE | OUTPATIENT
Start: 2018-03-22 | End: 2018-03-23

## 2018-03-22 RX ORDER — SODIUM CHLORIDE 0.9 % (FLUSH) 0.9 %
10 SYRINGE (ML) INJECTION AS NEEDED
Status: ACTIVE | OUTPATIENT
Start: 2018-03-22 | End: 2018-03-23

## 2018-03-22 RX ADMIN — Medication 500 UNITS: at 09:41

## 2018-03-22 RX ADMIN — Medication 10 ML: at 09:40

## 2018-03-22 RX ADMIN — Medication 10 ML: at 09:41

## 2018-03-22 NOTE — PROGRESS NOTES
[facility] VISIT NOTE    0930  Pt arrived at [facility] ambulatory and in no distress for routine port flush. Blood pressure 130/86, pulse 84, temperature 97.1 °F (36.2 °C), resp. rate 18. Right chest port accessed with 0.75 in guallpa no difficulty. Positive blood return noted and labs drawn. Port de-accessed and flushed per protocol. 0945  D/C'd from [facility] ambulatory and in no distress.  Next appointment is 4/19/18 at 10am.

## 2018-04-27 ENCOUNTER — HOSPITAL ENCOUNTER (OUTPATIENT)
Dept: INFUSION THERAPY | Age: 73
Discharge: HOME | End: 2018-04-27
Payer: MEDICARE

## 2018-04-27 VITALS
SYSTOLIC BLOOD PRESSURE: 152 MMHG | TEMPERATURE: 98.3 F | RESPIRATION RATE: 18 BRPM | DIASTOLIC BLOOD PRESSURE: 93 MMHG | HEART RATE: 68 BPM

## 2018-04-27 PROCEDURE — 77030012965 HC NDL HUBR BBMI -A

## 2018-04-27 PROCEDURE — 96523 IRRIG DRUG DELIVERY DEVICE: CPT

## 2018-04-27 NOTE — PROGRESS NOTES
Problem: Knowledge Deficit  Goal: *Verbalizes understanding of procedures and medications  Outcome: Progressing Towards Goal  Pt here for port f;lush.

## 2018-04-27 NOTE — PROGRESS NOTES
[facility] VISIT NOTE    Pt arrived at [facility] ambulatory and in no distress for port flush. Assessment completed, pt had . Patient Vitals for the past 12 hrs:   Temp Pulse Resp BP   04/27/18 0940 98.3 °F (36.8 °C) 68 18 (!) 152/93     Right chest port accessed with . 75  in guallpa no difficulty. Positive blood return noted. Tolerated treatment well, no adverse reaction noted. Port de-accessed and flushed per protocol. Positive blood return noted. D/C'd from [facility] ambulatory and in no distress. Next appointment is 5/17/18 at 10:00.

## 2018-05-17 ENCOUNTER — OFFICE VISIT (OUTPATIENT)
Dept: ONCOLOGY | Age: 73
End: 2018-05-17

## 2018-05-17 ENCOUNTER — TELEPHONE (OUTPATIENT)
Dept: ONCOLOGY | Age: 73
End: 2018-05-17

## 2018-05-17 ENCOUNTER — HOSPITAL ENCOUNTER (OUTPATIENT)
Dept: INFUSION THERAPY | Age: 73
Discharge: HOME | End: 2018-05-17
Payer: MEDICARE

## 2018-05-17 VITALS
OXYGEN SATURATION: 90 % | TEMPERATURE: 97.1 F | WEIGHT: 252 LBS | HEART RATE: 68 BPM | BODY MASS INDEX: 35.28 KG/M2 | SYSTOLIC BLOOD PRESSURE: 137 MMHG | HEIGHT: 71 IN | RESPIRATION RATE: 22 BRPM | DIASTOLIC BLOOD PRESSURE: 77 MMHG

## 2018-05-17 VITALS
TEMPERATURE: 97.7 F | DIASTOLIC BLOOD PRESSURE: 91 MMHG | SYSTOLIC BLOOD PRESSURE: 154 MMHG | HEART RATE: 72 BPM | RESPIRATION RATE: 18 BRPM

## 2018-05-17 DIAGNOSIS — R79.89 ELEVATED LFTS: ICD-10-CM

## 2018-05-17 DIAGNOSIS — C83.18 MANTLE CELL LYMPHOMA OF LYMPH NODES OF MULTIPLE REGIONS (HCC): Primary | ICD-10-CM

## 2018-05-17 DIAGNOSIS — I82.403 DEEP VENOUS EMBOLISM AND THROMBOSIS OF BOTH LOWER EXTREMITIES (HCC): ICD-10-CM

## 2018-05-17 DIAGNOSIS — I26.99 OTHER PULMONARY EMBOLISM WITHOUT ACUTE COR PULMONALE, UNSPECIFIED CHRONICITY (HCC): ICD-10-CM

## 2018-05-17 DIAGNOSIS — Z95.828 PORT CATHETER IN PLACE: ICD-10-CM

## 2018-05-17 LAB
ALBUMIN SERPL-MCNC: 3 G/DL (ref 3.5–5)
ALBUMIN/GLOB SERPL: 0.8 {RATIO} (ref 1.1–2.2)
ALP SERPL-CCNC: 160 U/L (ref 45–117)
ALT SERPL-CCNC: 154 U/L (ref 12–78)
ANION GAP SERPL CALC-SCNC: 7 MMOL/L (ref 5–15)
AST SERPL-CCNC: 54 U/L (ref 15–37)
BASOPHILS # BLD: 0.1 K/UL (ref 0–0.1)
BASOPHILS NFR BLD: 1 % (ref 0–1)
BILIRUB SERPL-MCNC: 0.8 MG/DL (ref 0.2–1)
BUN SERPL-MCNC: 18 MG/DL (ref 6–20)
BUN/CREAT SERPL: 16 (ref 12–20)
CALCIUM SERPL-MCNC: 8.6 MG/DL (ref 8.5–10.1)
CHLORIDE SERPL-SCNC: 102 MMOL/L (ref 97–108)
CO2 SERPL-SCNC: 31 MMOL/L (ref 21–32)
CREAT SERPL-MCNC: 1.13 MG/DL (ref 0.7–1.3)
DIFFERENTIAL METHOD BLD: ABNORMAL
EOSINOPHIL # BLD: 0 K/UL (ref 0–0.4)
EOSINOPHIL NFR BLD: 0 % (ref 0–7)
ERYTHROCYTE [DISTWIDTH] IN BLOOD BY AUTOMATED COUNT: 12.2 % (ref 11.5–14.5)
GLOBULIN SER CALC-MCNC: 3.8 G/DL (ref 2–4)
GLUCOSE SERPL-MCNC: 155 MG/DL (ref 65–100)
HCT VFR BLD AUTO: 33.3 % (ref 36.6–50.3)
HGB BLD-MCNC: 10.8 G/DL (ref 12.1–17)
IMM GRANULOCYTES # BLD: 0.1 K/UL (ref 0–0.04)
IMM GRANULOCYTES NFR BLD AUTO: 1 % (ref 0–0.5)
LDH SERPL L TO P-CCNC: 197 U/L (ref 85–241)
LYMPHOCYTES # BLD: 0.7 K/UL (ref 0.8–3.5)
LYMPHOCYTES NFR BLD: 12 % (ref 12–49)
MCH RBC QN AUTO: 30 PG (ref 26–34)
MCHC RBC AUTO-ENTMCNC: 32.4 G/DL (ref 30–36.5)
MCV RBC AUTO: 92.5 FL (ref 80–99)
MONOCYTES # BLD: 0.7 K/UL (ref 0–1)
MONOCYTES NFR BLD: 11 % (ref 5–13)
NEUTS SEG # BLD: 4.6 K/UL (ref 1.8–8)
NEUTS SEG NFR BLD: 75 % (ref 32–75)
NRBC # BLD: 0 K/UL (ref 0–0.01)
NRBC BLD-RTO: 0 PER 100 WBC
PLATELET # BLD AUTO: 147 K/UL (ref 150–400)
PMV BLD AUTO: 10.2 FL (ref 8.9–12.9)
POTASSIUM SERPL-SCNC: 3.2 MMOL/L (ref 3.5–5.1)
PROT SERPL-MCNC: 6.8 G/DL (ref 6.4–8.2)
RBC # BLD AUTO: 3.6 M/UL (ref 4.1–5.7)
RBC MORPH BLD: ABNORMAL
SODIUM SERPL-SCNC: 140 MMOL/L (ref 136–145)
WBC # BLD AUTO: 6.2 K/UL (ref 4.1–11.1)

## 2018-05-17 PROCEDURE — 77030012965 HC NDL HUBR BBMI -A

## 2018-05-17 PROCEDURE — 36591 DRAW BLOOD OFF VENOUS DEVICE: CPT

## 2018-05-17 PROCEDURE — 83615 LACTATE (LD) (LDH) ENZYME: CPT | Performed by: INTERNAL MEDICINE

## 2018-05-17 PROCEDURE — 36415 COLL VENOUS BLD VENIPUNCTURE: CPT | Performed by: INTERNAL MEDICINE

## 2018-05-17 PROCEDURE — 85025 COMPLETE CBC W/AUTO DIFF WBC: CPT | Performed by: INTERNAL MEDICINE

## 2018-05-17 PROCEDURE — 80053 COMPREHEN METABOLIC PANEL: CPT | Performed by: INTERNAL MEDICINE

## 2018-05-17 PROCEDURE — 74011250636 HC RX REV CODE- 250/636: Performed by: INTERNAL MEDICINE

## 2018-05-17 RX ORDER — SODIUM CHLORIDE 0.9 % (FLUSH) 0.9 %
10-40 SYRINGE (ML) INJECTION AS NEEDED
Status: DISCONTINUED | OUTPATIENT
Start: 2018-05-17 | End: 2018-05-21 | Stop reason: HOSPADM

## 2018-05-17 RX ORDER — HEPARIN 100 UNIT/ML
500 SYRINGE INTRAVENOUS AS NEEDED
Status: ACTIVE | OUTPATIENT
Start: 2018-05-17 | End: 2018-05-18

## 2018-05-17 RX ADMIN — Medication 10 ML: at 10:16

## 2018-05-17 RX ADMIN — Medication 500 UNITS: at 10:16

## 2018-05-17 NOTE — PROGRESS NOTES
Can[facility] [place] at [address], [address] [facility] [facility]  W: [phone]  F: [phone]      Reason for Visit:   Ciarra Dey is a 74 y.o. male who is seen for follow up of lymphoma. Treatment History:   · CTA Chest 11/11/2016: PE in left lower lobe pulmonary arteries, extensive adneopathy  · CT A/P 11/12/2016: Cirrhosis, massive splenomegaly, ascites, massive lymphadenopathy  · US guided axillary node biopsy 11/14/2016: CD5+ B-cell non-hodgkin lymphoma, phenotype most consistent with mantle cell lymphoma. FISH with t(11;14)  · PET-CT 11/23/2016: Marked splenomegaly with increased metabolic activity consistent with lymphoma. Bilateral cervical and axillary adenopathy. Mediastinal and right hilar and left diaphragmatic adenopathy. Bilateral pelvic and inguinal adenopathy. Multiple small mesenteric and retroperitoneal nodes with low grade or no activity. · BMBx 11/29/2016: Hypercellular marrow involved by mantle cell lymphoma  · Stage IV Mantle Cell Lymphoma  · Palliative chemotherapy with Rituximab and Bendamustine x6 cycles from 12/8/2016 to 5/12/17  · Bone marrow biopsy 5/23/2017: no evidence of lymphoma  · PET/CT 6/7/2017: There has been marked improvement in the adenopathy in the cervical region, axilla, mediastinum, abdomen and pelvis which now demonstrate no increased metabolic activity. There has been marked decrease in the spleen which now demonstrates no increased metabolic activity. History of Present Illness:   Here today for follow up. He states he continues to feel very well. Leg swelling resolved and hasn't been bothersome recently. Denies fevers, chills, night sweats, weight loss, adenopathy. No complaints today. Reports compliance with apixaban, no bleeding or dark stool. He is unaccompanied today. PAST HISTORY: The following sections were reviewed and updated in the EMR as appropriate: PMH, SH, FH, Medications, Allergies.     No Known Allergies     Review of Systems: A complete review of systems was obtained, reviewed, and scanned into the EMR. Pertinent findings reviewed above. Physical Exam:     Visit Vitals    /77 (BP 1 Location: Left arm, BP Patient Position: Sitting)  Comment: .  Pulse 68    Temp 97.1 °F (36.2 °C) (Oral)    Resp 22    Ht 5' 11\" (1.803 m)    Wt 252 lb (114.3 kg)    SpO2 90%    BMI 35.15 kg/m2     ECOG PS: 0  General: No distress  Eyes: PERRLA, anicteric sclerae  HENT: Atraumatic, OP clear  Neck: Supple  Lymphatic: No cervical, supraclavicular, or inguinal adenopathy  Respiratory: CTAB, normal respiratory effort  CV: Normal rate, regular rhythm, no murmurs. Bilateral LE edema- minimal, equal, non-pitting  GI: Soft, nontender, nondistended, no masses, no hepatomegaly, no splenomegaly  MS: Normal gait and station. Digits without clubbing or cyanosis. Skin: No rashes, ecchymoses, or petechiae. Normal temperature, turgor, and texture. Psych: Alert, oriented, appropriate affect, normal judgment/insight    Results:     Lab Results   Component Value Date/Time    WBC 6.2 05/17/2018 10:22 AM    HGB 10.8 (L) 05/17/2018 10:22 AM    HCT 33.3 (L) 05/17/2018 10:22 AM    PLATELET 297 (L) 75/61/5677 10:22 AM    MCV 92.5 05/17/2018 10:22 AM    ABS.  NEUTROPHILS 4.6 05/17/2018 10:22 AM    Hemoglobin (POC) 10.9 (L) 01/12/2017 08:02 AM    Hematocrit (POC) 32 (L) 01/12/2017 08:02 AM     Lab Results   Component Value Date/Time    Sodium 140 05/17/2018 10:22 AM    Potassium 3.2 (L) 05/17/2018 10:22 AM    Chloride 102 05/17/2018 10:22 AM    CO2 31 05/17/2018 10:22 AM    Glucose 155 (H) 05/17/2018 10:22 AM    BUN 18 05/17/2018 10:22 AM    Creatinine 1.13 05/17/2018 10:22 AM    GFR est AA >60 05/17/2018 10:22 AM    GFR est non-AA >60 05/17/2018 10:22 AM    Calcium 8.6 05/17/2018 10:22 AM    Sodium (POC) 143 01/12/2017 08:02 AM    Potassium (POC) 4.0 01/12/2017 08:02 AM    Chloride (POC) 104 01/12/2017 08:02 AM    Glucose (POC) 156 (H) 01/12/2017 08:02 AM    BUN (POC) 23 (H) 01/12/2017 08:02 AM    Creatinine (POC) 1.1 11/24/2017 08:36 AM    Calcium, ionized (POC) 1.19 01/12/2017 08:02 AM     Lab Results   Component Value Date/Time    Bilirubin, total 0.8 05/17/2018 10:22 AM    ALT (SGPT) 154 (H) 05/17/2018 10:22 AM    AST (SGOT) 54 (H) 05/17/2018 10:22 AM    Alk. phosphatase 160 (H) 05/17/2018 10:22 AM    Protein, total 6.8 05/17/2018 10:22 AM    Albumin 3.0 (L) 05/17/2018 10:22 AM    Globulin 3.8 05/17/2018 10:22 AM     Lab Results   Component Value Date/Time    Reticulocyte count 1.8 11/10/2016 02:00 PM    Iron % saturation 11 (L) 11/10/2016 02:00 PM    TIBC 240 (L) 11/10/2016 02:00 PM    Ferritin 100 11/10/2016 02:00 PM    Vitamin B12 415 11/10/2016 02:00 PM    Folate 15.6 11/10/2016 02:00 PM    Haptoglobin 186 11/11/2016 12:04 PM     05/17/2018 10:22 AM    Beta-2 Microglobulin, serum 4.4 (H) 11/15/2016 05:21 AM    TSH 4.06 (H) 11/10/2016 11:21 AM    Hep C  virus Ab Interp. NONREACTIVE 08/02/2016 08:54 AM     Lab Results   Component Value Date/Time    INR 1.1 11/11/2016 12:04 PM    aPTT 31.3 11/11/2016 12:04 PM    Fibrinogen 215 11/11/2016 12:04 PM       HepB/C negative  TTE 11/10/2016: EF 60%    Bone marrow biopsy 11/29/2016: Hypercellular marrow involved by mantle cell lymphoma    Bone marrow biopsy 5/23/17: no evidence of lymphoma    PET 6/7/17: no abnormal hypermetabolism    CT N/C/A/P 11/24/2017: No evidence of lymphadenopathy in the neck, chest, abdomen, or pelvis. Persistent mild splenomegaly. Small pulmonary nodules along the right minor fissure were likely obscured by atelectasis on prior studies, if present. 5 mm left upper lobe pulmonary nodule is unchanged. Cholecystolithiasis. CT C/A/P 3/6/2018:   1. No evidence for adenopathy chest abdomen or pelvis. 2. Stable 5 mm left upper lobe nodule seen posterolaterally. 3. No new or enlarging lung nodules.   4. Incidental cholelithiasis      Assessment:   1) Mantle cell lymphoma  Stage IV  His MIPI score was 6.24, which is High Risk and cooresponds to a median survival of 37 months and 5-year OS of 20%. He has completed chemotherapy with R-Bendamustine for 6 cycles. He had an excellent response to therapy based on repeat bone marrow biopsy and PET/CT. He has decided against transplant or maintenance rituximab. He continues to feel well and remains without evidence of recurrence on recent labs, history, or exam.  CT done earlier than expected, no evidence of recurrence. We will continue surveillance and see him back in about 12 weeks with port flush. 2) DVT, PE  Diagnosed 11/11/2016. Malignancy associated. Previously on lovenox. On apixaban BID, we will continue this long term. 3) Anemia  Resolved previously, recurring some today. Mild. Monitor. 4) Thrombocytopenia  Resolved. Secondary to splenomegaly/cirrhosis. Monitor. 5) Elevated LFTs  New today, resulted after patient left the pffice. Unclear etiology. Will get RUQ abdominal U/S to further evaluate. 6) Port care  Continue port flushes every 4-6 weeks. Plan:     · RUQ abdominal U/S   · Continue apixiban 5mg PO BID  · Port flush every 4-6 weeks  · Labs every 12 weeks: CBC, CMP, LD  · CT every 6 months  · Return to clinic in 12 weeks     Patient was seen in conjunction with Grace Gayle NP.     Signed By: Whitney Taylor MD

## 2018-05-17 NOTE — PROGRESS NOTES
[facility] Short Visit Nursing Progress Note    1010  Patient arrived ambulatory for Port flush and labs. VS Blood pressure (!) 154/91, pulse 72, temperature 97.7 °F (36.5 °C), resp. rate 18.    1025 PAC flushed/deaccessed and patient left in no distress; next appt. 6/14/18 @ 1000.

## 2018-05-22 NOTE — TELEPHONE ENCOUNTER
[address] at [place] [address]  [phone]    05/22/18- Spoke to patient, he has already scheduled abdominal ultrasound for next week. Explained that his LFTs were elevated on recent lab check, so we're doing the ultrasound to further evaluate. He verbalized understanding, no further questions or concerns.

## 2018-05-22 NOTE — TELEPHONE ENCOUNTER
[facility] [place]  [phone]    5/16 1630: Call placed to patient to notify him of abnormal LFTs on recent labs. Dr. Afia Thompson would like to get an U/S of his abdomen to check his liver. No answer, left message to return call.

## 2018-05-29 ENCOUNTER — HOSPITAL ENCOUNTER (OUTPATIENT)
Dept: ULTRASOUND IMAGING | Age: 73
Discharge: HOME | End: 2018-05-29
Attending: NURSE PRACTITIONER
Payer: MEDICARE

## 2018-05-29 DIAGNOSIS — R79.89 ELEVATED LFTS: ICD-10-CM

## 2018-05-29 PROCEDURE — 76705 ECHO EXAM OF ABDOMEN: CPT

## 2018-06-14 ENCOUNTER — HOSPITAL ENCOUNTER (OUTPATIENT)
Dept: INFUSION THERAPY | Age: 73
Discharge: HOME | End: 2018-06-14
Payer: MEDICARE

## 2018-06-14 VITALS
TEMPERATURE: 98.1 F | RESPIRATION RATE: 18 BRPM | HEART RATE: 72 BPM | DIASTOLIC BLOOD PRESSURE: 86 MMHG | SYSTOLIC BLOOD PRESSURE: 144 MMHG

## 2018-06-14 LAB
ALBUMIN SERPL-MCNC: 3.4 G/DL (ref 3.5–5)
ALBUMIN/GLOB SERPL: 0.9 {RATIO} (ref 1.1–2.2)
ALP SERPL-CCNC: 111 U/L (ref 45–117)
ALT SERPL-CCNC: 35 U/L (ref 12–78)
ANION GAP SERPL CALC-SCNC: 11 MMOL/L (ref 5–15)
APTT PPP: 31.6 SEC (ref 22.1–32)
AST SERPL-CCNC: 21 U/L (ref 15–37)
BILIRUB SERPL-MCNC: 0.3 MG/DL (ref 0.2–1)
BUN SERPL-MCNC: 21 MG/DL (ref 6–20)
BUN/CREAT SERPL: 16 (ref 12–20)
CALCIUM SERPL-MCNC: 9 MG/DL (ref 8.5–10.1)
CHLORIDE SERPL-SCNC: 103 MMOL/L (ref 97–108)
CO2 SERPL-SCNC: 29 MMOL/L (ref 21–32)
CREAT SERPL-MCNC: 1.31 MG/DL (ref 0.7–1.3)
GLOBULIN SER CALC-MCNC: 3.6 G/DL (ref 2–4)
GLUCOSE SERPL-MCNC: 140 MG/DL (ref 65–100)
HAV IGM SER QL: NONREACTIVE
HBV CORE IGM SER QL: NONREACTIVE
HBV SURFACE AG SER QL: 0.41 INDEX
HBV SURFACE AG SER QL: NEGATIVE
HCV AB SERPL QL IA: NONREACTIVE
HCV COMMENT,HCGAC: NORMAL
INR PPP: 1 (ref 0.9–1.1)
POTASSIUM SERPL-SCNC: 4.2 MMOL/L (ref 3.5–5.1)
PROT SERPL-MCNC: 7 G/DL (ref 6.4–8.2)
PROTHROMBIN TIME: 10.4 SEC (ref 9–11.1)
SODIUM SERPL-SCNC: 143 MMOL/L (ref 136–145)
SP1: NORMAL
SP2: NORMAL
SP3: NORMAL
THERAPEUTIC RANGE,PTTT: NORMAL SECS (ref 58–77)

## 2018-06-14 PROCEDURE — 96523 IRRIG DRUG DELIVERY DEVICE: CPT

## 2018-06-14 PROCEDURE — 85610 PROTHROMBIN TIME: CPT | Performed by: INTERNAL MEDICINE

## 2018-06-14 PROCEDURE — 85730 THROMBOPLASTIN TIME PARTIAL: CPT | Performed by: INTERNAL MEDICINE

## 2018-06-14 PROCEDURE — 80053 COMPREHEN METABOLIC PANEL: CPT | Performed by: INTERNAL MEDICINE

## 2018-06-14 PROCEDURE — 74011250636 HC RX REV CODE- 250/636: Performed by: INTERNAL MEDICINE

## 2018-06-14 PROCEDURE — 36415 COLL VENOUS BLD VENIPUNCTURE: CPT | Performed by: INTERNAL MEDICINE

## 2018-06-14 PROCEDURE — 80074 ACUTE HEPATITIS PANEL: CPT | Performed by: INTERNAL MEDICINE

## 2018-06-14 PROCEDURE — 77030012965 HC NDL HUBR BBMI -A

## 2018-06-14 RX ORDER — SODIUM CHLORIDE 0.9 % (FLUSH) 0.9 %
10-40 SYRINGE (ML) INJECTION AS NEEDED
Status: ACTIVE | OUTPATIENT
Start: 2018-06-14 | End: 2018-06-15

## 2018-06-14 RX ORDER — HEPARIN 100 UNIT/ML
500 SYRINGE INTRAVENOUS AS NEEDED
Status: ACTIVE | OUTPATIENT
Start: 2018-06-14 | End: 2018-06-15

## 2018-06-14 RX ADMIN — HEPARIN 500 UNITS: 100 SYRINGE at 10:18

## 2018-06-14 RX ADMIN — Medication 10 ML: at 10:18

## 2018-06-14 NOTE — PROGRESS NOTES
[facility] VISIT NOTE    Pt arrived at [facility] ambulatory and in no distress for labs/port flush. Patient Vitals for the past 12 hrs:   Temp Pulse Resp BP   06/14/18 1004 98.1 °F (36.7 °C) 72 18 144/86     Right chest port accessed with . 75   in guallpa no difficulty. Positive blood return noted and labs drawn. Tolerated treatment well, no adverse reaction noted. Port de-accessed and flushed per protocol. Positive blood return noted. D/C'd from [facility] ambulatory and in no distress. Next appointment is .

## 2018-06-14 NOTE — PROGRESS NOTES
Problem: Knowledge Deficit  Goal: *Verbalizes understanding of procedures and medications  Outcome: Progressing Towards Goal  Pt here for port flush and labs

## 2018-07-12 ENCOUNTER — HOSPITAL ENCOUNTER (OUTPATIENT)
Dept: INFUSION THERAPY | Age: 73
Discharge: HOME | End: 2018-07-12
Payer: MEDICARE

## 2018-07-12 VITALS
HEART RATE: 79 BPM | TEMPERATURE: 97.4 F | SYSTOLIC BLOOD PRESSURE: 167 MMHG | RESPIRATION RATE: 18 BRPM | DIASTOLIC BLOOD PRESSURE: 95 MMHG

## 2018-07-12 PROCEDURE — 96523 IRRIG DRUG DELIVERY DEVICE: CPT

## 2018-07-12 PROCEDURE — 77030012965 HC NDL HUBR BBMI -A

## 2018-07-12 PROCEDURE — 74011250636 HC RX REV CODE- 250/636: Performed by: INTERNAL MEDICINE

## 2018-07-12 RX ORDER — SODIUM CHLORIDE 0.9 % (FLUSH) 0.9 %
10 SYRINGE (ML) INJECTION AS NEEDED
Status: ACTIVE | OUTPATIENT
Start: 2018-07-12 | End: 2018-07-13

## 2018-07-12 RX ORDER — HEPARIN 100 UNIT/ML
500 SYRINGE INTRAVENOUS AS NEEDED
Status: ACTIVE | OUTPATIENT
Start: 2018-07-12 | End: 2018-07-13

## 2018-07-12 RX ORDER — SODIUM CHLORIDE 9 MG/ML
10 INJECTION INTRAMUSCULAR; INTRAVENOUS; SUBCUTANEOUS AS NEEDED
Status: ACTIVE | OUTPATIENT
Start: 2018-07-12 | End: 2018-07-13

## 2018-07-12 RX ADMIN — Medication 10 ML: at 09:59

## 2018-07-12 RX ADMIN — HEPARIN 500 UNITS: 100 SYRINGE at 10:00

## 2018-07-12 RX ADMIN — SODIUM CHLORIDE 10 ML: 9 INJECTION INTRAMUSCULAR; INTRAVENOUS; SUBCUTANEOUS at 09:55

## 2018-07-12 NOTE — PROGRESS NOTES
[address] SHORT VISIT NOTE    3486  Pt arrived to [facility] ambulatory and in no distress for port flush. Denies any new complaints. Blood pressure (!) 167/95, pulse 79, temperature 97.4 °F (36.3 °C), resp. rate 18. Right chest port accessed with 0.75in guallpa. Positive blood return noted; port flushed and de-accessed per protocol. 1000  Discharged home ambulatory and in no distress. Tolerated treatment well. Next appointment 8/9/18 at 1000.

## 2018-08-06 ENCOUNTER — HOSPITAL ENCOUNTER (OUTPATIENT)
Dept: CT IMAGING | Age: 73
Discharge: HOME | End: 2018-08-06
Attending: NURSE PRACTITIONER
Payer: MEDICARE

## 2018-08-06 DIAGNOSIS — C83.18 MANTLE CELL LYMPHOMA OF LYMPH NODES OF MULTIPLE REGIONS (HCC): ICD-10-CM

## 2018-08-06 PROCEDURE — 74177 CT ABD & PELVIS W/CONTRAST: CPT

## 2018-08-06 PROCEDURE — 74011250636 HC RX REV CODE- 250/636

## 2018-08-06 PROCEDURE — 74011636320 HC RX REV CODE- 636/320: Performed by: RADIOLOGY

## 2018-08-06 PROCEDURE — 77030003560 HC NDL HUBR BARD -A

## 2018-08-06 RX ORDER — HEPARIN 100 UNIT/ML
500 SYRINGE INTRAVENOUS
Status: ACTIVE | OUTPATIENT
Start: 2018-08-06 | End: 2018-08-06

## 2018-08-06 RX ORDER — HEPARIN 100 UNIT/ML
SYRINGE INTRAVENOUS
Status: COMPLETED
Start: 2018-08-06 | End: 2018-08-06

## 2018-08-06 RX ADMIN — IOPAMIDOL 98 ML: 755 INJECTION, SOLUTION INTRAVENOUS at 08:39

## 2018-08-06 RX ADMIN — SODIUM CHLORIDE, PRESERVATIVE FREE 500 UNITS: 5 INJECTION INTRAVENOUS at 09:00

## 2018-08-09 ENCOUNTER — HOSPITAL ENCOUNTER (OUTPATIENT)
Dept: INFUSION THERAPY | Age: 73
Discharge: HOME | End: 2018-08-09
Payer: MEDICARE

## 2018-08-09 ENCOUNTER — OFFICE VISIT (OUTPATIENT)
Dept: ONCOLOGY | Age: 73
End: 2018-08-09

## 2018-08-09 VITALS
HEIGHT: 71 IN | RESPIRATION RATE: 18 BRPM | OXYGEN SATURATION: 97 % | HEART RATE: 70 BPM | DIASTOLIC BLOOD PRESSURE: 87 MMHG | WEIGHT: 259.2 LBS | SYSTOLIC BLOOD PRESSURE: 161 MMHG | BODY MASS INDEX: 36.29 KG/M2

## 2018-08-09 VITALS
HEART RATE: 65 BPM | DIASTOLIC BLOOD PRESSURE: 93 MMHG | TEMPERATURE: 98 F | RESPIRATION RATE: 18 BRPM | SYSTOLIC BLOOD PRESSURE: 152 MMHG

## 2018-08-09 DIAGNOSIS — C83.18 MANTLE CELL LYMPHOMA OF LYMPH NODES OF MULTIPLE REGIONS (HCC): Primary | ICD-10-CM

## 2018-08-09 LAB
ALBUMIN SERPL-MCNC: 3.5 G/DL (ref 3.5–5)
ALBUMIN/GLOB SERPL: 1 {RATIO} (ref 1.1–2.2)
ALP SERPL-CCNC: 97 U/L (ref 45–117)
ALT SERPL-CCNC: 28 U/L (ref 12–78)
ANION GAP SERPL CALC-SCNC: 5 MMOL/L (ref 5–15)
AST SERPL-CCNC: 18 U/L (ref 15–37)
BASOPHILS # BLD: 0 K/UL (ref 0–0.1)
BASOPHILS NFR BLD: 1 % (ref 0–1)
BILIRUB SERPL-MCNC: 0.4 MG/DL (ref 0.2–1)
BUN SERPL-MCNC: 16 MG/DL (ref 6–20)
BUN/CREAT SERPL: 15 (ref 12–20)
CALCIUM SERPL-MCNC: 8.8 MG/DL (ref 8.5–10.1)
CHLORIDE SERPL-SCNC: 107 MMOL/L (ref 97–108)
CO2 SERPL-SCNC: 30 MMOL/L (ref 21–32)
CREAT SERPL-MCNC: 1.07 MG/DL (ref 0.7–1.3)
DIFFERENTIAL METHOD BLD: ABNORMAL
EOSINOPHIL # BLD: 0.2 K/UL (ref 0–0.4)
EOSINOPHIL NFR BLD: 5 % (ref 0–7)
ERYTHROCYTE [DISTWIDTH] IN BLOOD BY AUTOMATED COUNT: 12.7 % (ref 11.5–14.5)
GLOBULIN SER CALC-MCNC: 3.6 G/DL (ref 2–4)
GLUCOSE SERPL-MCNC: 96 MG/DL (ref 65–100)
HCT VFR BLD AUTO: 38 % (ref 36.6–50.3)
HGB BLD-MCNC: 12.3 G/DL (ref 12.1–17)
IMM GRANULOCYTES # BLD: 0 K/UL (ref 0–0.04)
IMM GRANULOCYTES NFR BLD AUTO: 0 % (ref 0–0.5)
LDH SERPL L TO P-CCNC: 185 U/L (ref 85–241)
LYMPHOCYTES # BLD: 0.9 K/UL (ref 0.8–3.5)
LYMPHOCYTES NFR BLD: 19 % (ref 12–49)
MCH RBC QN AUTO: 30.1 PG (ref 26–34)
MCHC RBC AUTO-ENTMCNC: 32.4 G/DL (ref 30–36.5)
MCV RBC AUTO: 92.9 FL (ref 80–99)
MONOCYTES # BLD: 0.5 K/UL (ref 0–1)
MONOCYTES NFR BLD: 11 % (ref 5–13)
NEUTS SEG # BLD: 3 K/UL (ref 1.8–8)
NEUTS SEG NFR BLD: 64 % (ref 32–75)
NRBC # BLD: 0 K/UL (ref 0–0.01)
NRBC BLD-RTO: 0 PER 100 WBC
PLATELET # BLD AUTO: 168 K/UL (ref 150–400)
PMV BLD AUTO: 9.8 FL (ref 8.9–12.9)
POTASSIUM SERPL-SCNC: 4.3 MMOL/L (ref 3.5–5.1)
PROT SERPL-MCNC: 7.1 G/DL (ref 6.4–8.2)
RBC # BLD AUTO: 4.09 M/UL (ref 4.1–5.7)
SODIUM SERPL-SCNC: 142 MMOL/L (ref 136–145)
WBC # BLD AUTO: 4.7 K/UL (ref 4.1–11.1)

## 2018-08-09 PROCEDURE — 85025 COMPLETE CBC W/AUTO DIFF WBC: CPT | Performed by: INTERNAL MEDICINE

## 2018-08-09 PROCEDURE — 83615 LACTATE (LD) (LDH) ENZYME: CPT | Performed by: INTERNAL MEDICINE

## 2018-08-09 PROCEDURE — 36415 COLL VENOUS BLD VENIPUNCTURE: CPT | Performed by: INTERNAL MEDICINE

## 2018-08-09 PROCEDURE — 77030012965 HC NDL HUBR BBMI -A

## 2018-08-09 PROCEDURE — 36591 DRAW BLOOD OFF VENOUS DEVICE: CPT

## 2018-08-09 PROCEDURE — 74011000250 HC RX REV CODE- 250: Performed by: INTERNAL MEDICINE

## 2018-08-09 PROCEDURE — 82105 ALPHA-FETOPROTEIN SERUM: CPT | Performed by: INTERNAL MEDICINE

## 2018-08-09 PROCEDURE — 74011250636 HC RX REV CODE- 250/636: Performed by: INTERNAL MEDICINE

## 2018-08-09 PROCEDURE — 80053 COMPREHEN METABOLIC PANEL: CPT | Performed by: INTERNAL MEDICINE

## 2018-08-09 RX ORDER — SODIUM CHLORIDE 9 MG/ML
10 INJECTION INTRAMUSCULAR; INTRAVENOUS; SUBCUTANEOUS AS NEEDED
Status: ACTIVE | OUTPATIENT
Start: 2018-08-09 | End: 2018-08-10

## 2018-08-09 RX ORDER — HEPARIN 100 UNIT/ML
500 SYRINGE INTRAVENOUS AS NEEDED
Status: ACTIVE | OUTPATIENT
Start: 2018-08-09 | End: 2018-08-10

## 2018-08-09 RX ORDER — SODIUM CHLORIDE 0.9 % (FLUSH) 0.9 %
10 SYRINGE (ML) INJECTION AS NEEDED
Status: ACTIVE | OUTPATIENT
Start: 2018-08-09 | End: 2018-08-10

## 2018-08-09 RX ADMIN — SODIUM CHLORIDE, PRESERVATIVE FREE 500 UNITS: 5 INJECTION INTRAVENOUS at 09:45

## 2018-08-09 RX ADMIN — SODIUM CHLORIDE 10 ML: 9 INJECTION, SOLUTION INTRAMUSCULAR; INTRAVENOUS; SUBCUTANEOUS at 09:40

## 2018-08-09 RX ADMIN — Medication 10 ML: at 09:44

## 2018-08-09 RX ADMIN — Medication 10 ML: at 09:45

## 2018-08-09 NOTE — PROGRESS NOTES
Cancer [place] at [facility] 88  301 [facility], 2329 [facility] 1900 [facility] Ma: [phone]  F: [phone]      Reason for Visit:   Janet Wu is a 74 y.o. male who is seen for follow up of lymphoma. Treatment History:   · CTA Chest 11/11/2016: PE in left lower lobe pulmonary arteries, extensive adneopathy  · CT A/P 11/12/2016: Cirrhosis, massive splenomegaly, ascites, massive lymphadenopathy  · US guided axillary node biopsy 11/14/2016: CD5+ B-cell non-hodgkin lymphoma, phenotype most consistent with mantle cell lymphoma. FISH with t(11;14)  · PET-CT 11/23/2016: Marked splenomegaly with increased metabolic activity consistent with lymphoma. Bilateral cervical and axillary adenopathy. Mediastinal and right hilar and left diaphragmatic adenopathy. Bilateral pelvic and inguinal adenopathy. Multiple small mesenteric and retroperitoneal nodes with low grade or no activity. · BMBx 11/29/2016: Hypercellular marrow involved by mantle cell lymphoma  · Stage IV Mantle Cell Lymphoma  · Palliative chemotherapy with Rituximab and Bendamustine x6 cycles from 12/8/2016 to 5/12/17  · Bone marrow biopsy 5/23/2017: no evidence of lymphoma  · PET/CT 6/7/2017: There has been marked improvement in the adenopathy in the cervical region, axilla, mediastinum, abdomen and pelvis which now demonstrate no increased metabolic activity. There has been marked decrease in the spleen which now demonstrates no increased metabolic activity. History of Present Illness:   He reports feeling well. No new issues. Some chronic arthritis issues, stable and tolerable. No new pains. No fevers, chills, night sweats, unintentional weight loss, adenopathy. Reports compliance with apixaban, no bleeding or dark stool. He is unaccompanied today. PAST HISTORY: The following sections were reviewed and updated in the EMR as appropriate: PMH, SH, FH, Medications, Allergies.     No Known Allergies     Review of Systems: A complete review of systems was obtained, reviewed, and scanned into the EMR. Pertinent findings reviewed above. Physical Exam:     Visit Vitals    /87 (BP 1 Location: Right arm, BP Patient Position: Sitting)    Pulse 70    Resp 18    Ht 5' 11\" (1.803 m)    Wt 259 lb 3.2 oz (117.6 kg)    SpO2 97%    BMI 36.15 kg/m2     ECOG PS: 0  General: No distress  Eyes: PERRLA, anicteric sclerae  HENT: Atraumatic, OP clear  Neck: Supple  Lymphatic: No cervical, supraclavicular, or inguinal adenopathy  Respiratory: CTAB, normal respiratory effort  CV: Normal rate, regular rhythm, no murmurs. Bilateral LE edema- minimal, equal, non-pitting  GI: Soft, nontender, nondistended, no masses, no hepatomegaly, no splenomegaly  MS: Normal gait and station. Digits without clubbing or cyanosis. Skin: No rashes, ecchymoses, or petechiae. Normal temperature, turgor, and texture. Psych: Alert, oriented, appropriate affect, normal judgment/insight    Results:     Lab Results   Component Value Date/Time    WBC 6.2 05/17/2018 10:22 AM    HGB 10.8 (L) 05/17/2018 10:22 AM    HCT 33.3 (L) 05/17/2018 10:22 AM    PLATELET 053 (L) 24/04/2701 10:22 AM    MCV 92.5 05/17/2018 10:22 AM    ABS.  NEUTROPHILS 4.6 05/17/2018 10:22 AM    Hemoglobin (POC) 10.9 (L) 01/12/2017 08:02 AM    Hematocrit (POC) 32 (L) 01/12/2017 08:02 AM     Lab Results   Component Value Date/Time    Sodium 143 06/14/2018 10:09 AM    Potassium 4.2 06/14/2018 10:09 AM    Chloride 103 06/14/2018 10:09 AM    CO2 29 06/14/2018 10:09 AM    Glucose 140 (H) 06/14/2018 10:09 AM    BUN 21 (H) 06/14/2018 10:09 AM    Creatinine 1.31 (H) 06/14/2018 10:09 AM    GFR est AA >60 06/14/2018 10:09 AM    GFR est non-AA 53 (L) 06/14/2018 10:09 AM    Calcium 9.0 06/14/2018 10:09 AM    Sodium (POC) 143 01/12/2017 08:02 AM    Potassium (POC) 4.0 01/12/2017 08:02 AM    Chloride (POC) 104 01/12/2017 08:02 AM    Glucose (POC) 156 (H) 01/12/2017 08:02 AM    BUN (POC) 23 (H) 01/12/2017 08:02 AM    Creatinine (POC) 1.1 11/24/2017 08:36 AM    Calcium, ionized (POC) 1.19 01/12/2017 08:02 AM     Lab Results   Component Value Date/Time    Bilirubin, total 0.3 06/14/2018 10:09 AM    ALT (SGPT) 35 06/14/2018 10:09 AM    AST (SGOT) 21 06/14/2018 10:09 AM    Alk. phosphatase 111 06/14/2018 10:09 AM    Protein, total 7.0 06/14/2018 10:09 AM    Albumin 3.4 (L) 06/14/2018 10:09 AM    Globulin 3.6 06/14/2018 10:09 AM     Lab Results   Component Value Date/Time    Reticulocyte count 1.8 11/10/2016 02:00 PM    Iron % saturation 11 (L) 11/10/2016 02:00 PM    TIBC 240 (L) 11/10/2016 02:00 PM    Ferritin 100 11/10/2016 02:00 PM    Vitamin B12 415 11/10/2016 02:00 PM    Folate 15.6 11/10/2016 02:00 PM    Haptoglobin 186 11/11/2016 12:04 PM     05/17/2018 10:22 AM    Beta-2 Microglobulin, serum 4.4 (H) 11/15/2016 05:21 AM    TSH 4.06 (H) 11/10/2016 11:21 AM    Hep C  virus Ab Interp. NONREACTIVE 06/14/2018 10:09 AM     Lab Results   Component Value Date/Time    INR 1.0 06/14/2018 10:09 AM    aPTT 31.6 06/14/2018 10:09 AM    Fibrinogen 215 11/11/2016 12:04 PM       HepB/C negative  TTE 11/10/2016: EF 60%    Bone marrow biopsy 11/29/2016: Hypercellular marrow involved by mantle cell lymphoma    Bone marrow biopsy 5/23/17: no evidence of lymphoma    US abdomen 5/29/2018:  1. Increased hepatic echogenicity compatible with diffuse hepatocellular  process, most commonly seen in steatosis. 2. Areas of compromised evaluation due to extensive bowel gas as above with  nonvisualization of gallbladder and common bile duct. CT C/A/P 8/6/2018: No lymphadenopathy in the chest, abdomen or pelvis. Assessment:   1) Mantle cell lymphoma  Stage IV  His MIPI score was 6.24, which is High Risk and cooresponds to a median survival of 37 months and 5-year OS of 20%. He has completed chemotherapy with R-Bendamustine for 6 cycles. He had an excellent response to therapy based on repeat bone marrow biopsy and PET/CT.   He has decided against transplant or maintenance rituximab. He continues to feel well and remains without evidence of recurrence on recent labs, history, or exam.  We will continue surveillance and see him back in about 12 weeks with port flush. 2) DVT, PE  Diagnosed 11/11/2016. Malignancy associated. Previously on lovenox. Currently, on apixaban BID, we will continue this long term. 3) Anemia  Resolved previously, recurring some on last check. Repeat pending today. 4) Thrombocytopenia  Mild. Secondary to splenomegaly/cirrhosis. Monitor. Repeat pending today. 5) Elevated LFTs  Resolved on repeat labs. Hepatitis panel negative. US with fatty liver, otherwise unremarkable. 6) Port care  Continue port flushes every 4-6 weeks.      Plan:     · Continue apixiban 5mg PO BID  · Port flush every 4-6 weeks  · Labs every 12 weeks: CBC, CMP, LD  · CT every 6 months  · Return to clinic in 12 weeks       Signed By: Angelic Sewell MD

## 2018-08-09 NOTE — PROGRESS NOTES
[address] [facility] SHORT VISIT NOTE    0935  Pt arrived to [facility] ambulatory and in no distress for labs/port flush. Denies any new complaints. Blood pressure (!) 152/93, pulse 65, temperature 98 °F (36.7 °C), resp. rate 18. Right chest port accessed with 0.75in guallpa without difficulty and positive blood return noted. Labs drawn as ordered and port flushed and de-accessed per protocol. [id number]  Discharged home ambulatory and in no distress. Tolerated treatment well. Next appointment 9/6/18 at 1000.

## 2018-08-10 LAB — AFP-TM SERPL-MCNC: 1.8 NG/ML (ref 0–8.3)

## 2018-09-06 ENCOUNTER — HOSPITAL ENCOUNTER (OUTPATIENT)
Dept: INFUSION THERAPY | Age: 73
Discharge: HOME | End: 2018-09-06
Payer: MEDICARE

## 2018-09-06 VITALS
TEMPERATURE: 96.3 F | RESPIRATION RATE: 18 BRPM | DIASTOLIC BLOOD PRESSURE: 84 MMHG | HEART RATE: 69 BPM | SYSTOLIC BLOOD PRESSURE: 155 MMHG

## 2018-09-06 PROCEDURE — 77030012965 HC NDL HUBR BBMI -A

## 2018-09-06 PROCEDURE — 96523 IRRIG DRUG DELIVERY DEVICE: CPT

## 2018-09-06 PROCEDURE — 74011250636 HC RX REV CODE- 250/636: Performed by: INTERNAL MEDICINE

## 2018-09-06 RX ORDER — HEPARIN 100 UNIT/ML
500 SYRINGE INTRAVENOUS AS NEEDED
Status: ACTIVE | OUTPATIENT
Start: 2018-09-06 | End: 2018-09-07

## 2018-09-06 RX ORDER — SODIUM CHLORIDE 0.9 % (FLUSH) 0.9 %
10 SYRINGE (ML) INJECTION AS NEEDED
Status: ACTIVE | OUTPATIENT
Start: 2018-09-06 | End: 2018-09-07

## 2018-09-06 RX ADMIN — HEPARIN 500 UNITS: 100 SYRINGE at 10:10

## 2018-09-06 RX ADMIN — Medication 10 ML: at 10:10

## 2018-09-06 NOTE — PROGRESS NOTES
Outpatient Infusion Center Short Visit Progress Note 
 
1000 Patient admitted to [facility] for port flush ambulatory in stable condition. Assessment completed. No new concerns voiced. Pt reports being actively involved in [employer] and enjoys this activity daily. Pt shared his recent scans were \"all clear\" and everything was \"going great\". Right chest port accessed without difficulty. Positive blood return noted. Flushed per protocol and guallpa removed. Visit Vitals  /84  Pulse 69  Temp 96.3 °F (35.7 °C)  Resp 18 Medications: 
IV NS 
IV Heparin 1015 Patient tolerated treatment well. Patient discharged from [facility] 58 ambulatory in no distress. Patient aware of next appointment scheduled for 10/4 @0930.

## 2018-10-04 ENCOUNTER — HOSPITAL ENCOUNTER (OUTPATIENT)
Dept: INFUSION THERAPY | Age: 73
Discharge: HOME | End: 2018-10-04
Payer: MEDICARE

## 2018-10-04 VITALS
TEMPERATURE: 97 F | SYSTOLIC BLOOD PRESSURE: 163 MMHG | DIASTOLIC BLOOD PRESSURE: 89 MMHG | HEART RATE: 77 BPM | RESPIRATION RATE: 18 BRPM

## 2018-10-04 PROCEDURE — 77030012965 HC NDL HUBR BBMI -A

## 2018-10-04 PROCEDURE — 96523 IRRIG DRUG DELIVERY DEVICE: CPT

## 2018-10-04 RX ORDER — SODIUM CHLORIDE 9 MG/ML
10 INJECTION INTRAMUSCULAR; INTRAVENOUS; SUBCUTANEOUS AS NEEDED
Status: ACTIVE | OUTPATIENT
Start: 2018-10-04 | End: 2018-10-05

## 2018-10-04 RX ORDER — HEPARIN 100 UNIT/ML
500 SYRINGE INTRAVENOUS AS NEEDED
Status: ACTIVE | OUTPATIENT
Start: 2018-10-04 | End: 2018-10-05

## 2018-10-04 RX ORDER — SODIUM CHLORIDE 0.9 % (FLUSH) 0.9 %
10-40 SYRINGE (ML) INJECTION AS NEEDED
Status: ACTIVE | OUTPATIENT
Start: 2018-10-04 | End: 2018-10-05

## 2018-10-04 NOTE — PROGRESS NOTES
Outpatient Infusion Center Progress Note 0930 Pt admit to [facility] for port flush ambulatory in stable condition. Assessment completed. No new concerns voiced. Port accessed with positive blood return. Port flushed, heparinized and de-accessed. Visit Vitals  /89  Pulse 77  Temp 97 °F (36.1 °C)  Resp 18  
 
 
 
 
0945 Pt tolerated treatment well. D/c home ambulatory in no distress. Pt aware of next appointment scheduled for 11/1/18.

## 2018-10-29 NOTE — PROGRESS NOTES
Cancer [place] at [place] [address] [address] [facility] [address] [facility]  Ch[place]: [phone]  F: [phone]      Reason for Visit:   Nuris Dotson is a 74 y.o. male who is seen for follow up of lymphoma. Treatment History:   · CTA Chest 11/11/2016: PE in left lower lobe pulmonary arteries, extensive adneopathy  · CT A/P 11/12/2016: Cirrhosis, massive splenomegaly, ascites, massive lymphadenopathy  · US guided axillary node biopsy 11/14/2016: CD5+ B-cell non-hodgkin lymphoma, phenotype most consistent with mantle cell lymphoma. FISH with t(11;14)  · PET-CT 11/23/2016: Marked splenomegaly with increased metabolic activity consistent with lymphoma. Bilateral cervical and axillary adenopathy. Mediastinal and right hilar and left diaphragmatic adenopathy. Bilateral pelvic and inguinal adenopathy. Multiple small mesenteric and retroperitoneal nodes with low grade or no activity. · BMBx 11/29/2016: Hypercellular marrow involved by mantle cell lymphoma  · Stage IV Mantle Cell Lymphoma  · Palliative chemotherapy with Rituximab and Bendamustine x6 cycles from 12/8/2016 to 5/12/17  · Bone marrow biopsy 5/23/2017: no evidence of lymphoma  · PET/CT 6/7/2017: There has been marked improvement in the adenopathy in the cervical region, axilla, mediastinum, abdomen and pelvis which now demonstrate no increased metabolic activity. There has been marked decrease in the spleen which now demonstrates no increased metabolic activity. History of Present Illness:   He reports a URI last week, symptoms resolved now. No fevers, chills, night sweats, unintentional weight loss, adenopathy. Arthritis stable. Reports compliance with apixaban, no bleeding or dark stool. He is unaccompanied today. PAST HISTORY: The following sections were reviewed and updated in the EMR as appropriate: PMH, SH, FH, Medications, Allergies.     No Known Allergies     Review of Systems: A complete review of systems was obtained, reviewed, and scanned into the EMR. Pertinent findings reviewed above. Physical Exam:     Visit Vitals  BP (!) 169/98 (BP 1 Location: Right arm, BP Patient Position: Sitting)   Pulse 67   Temp 97.4 °F (36.3 °C) (Oral)   Resp 18   Ht 5' 11\" (1.803 m)   Wt 262 lb (118.8 kg)   SpO2 94%   BMI 36.54 kg/m²     ECOG PS: 0  General: No distress  Eyes: PERRLA, anicteric sclerae  HENT: Atraumatic, OP clear  Neck: Supple  Lymphatic: No cervical, supraclavicular, or inguinal adenopathy  Respiratory: CTAB, normal respiratory effort  CV: Normal rate, regular rhythm, no murmurs. Bilateral LE edema- minimal, equal, non-pitting  GI: Soft, nontender, nondistended, no masses, no hepatomegaly, no splenomegaly  MS: Normal gait and station. Digits without clubbing or cyanosis. Skin: No rashes, ecchymoses, or petechiae. Normal temperature, turgor, and texture. Psych: Alert, oriented, appropriate affect, normal judgment/insight    Results:     Lab Results   Component Value Date/Time    WBC 5.0 11/01/2018 08:36 AM    HGB 12.3 11/01/2018 08:36 AM    HCT 39.1 11/01/2018 08:36 AM    PLATELET 097 14/86/6807 08:36 AM    MCV 93.3 11/01/2018 08:36 AM    ABS.  NEUTROPHILS 3.4 11/01/2018 08:36 AM    Hemoglobin (POC) 10.9 (L) 01/12/2017 08:02 AM    Hematocrit (POC) 32 (L) 01/12/2017 08:02 AM     Lab Results   Component Value Date/Time    Sodium 142 08/09/2018 09:43 AM    Potassium 4.3 08/09/2018 09:43 AM    Chloride 107 08/09/2018 09:43 AM    CO2 30 08/09/2018 09:43 AM    Glucose 96 08/09/2018 09:43 AM    BUN 16 08/09/2018 09:43 AM    Creatinine 1.07 08/09/2018 09:43 AM    GFR est AA >60 08/09/2018 09:43 AM    GFR est non-AA >60 08/09/2018 09:43 AM    Calcium 8.8 08/09/2018 09:43 AM    Sodium (POC) 143 01/12/2017 08:02 AM    Potassium (POC) 4.0 01/12/2017 08:02 AM    Chloride (POC) 104 01/12/2017 08:02 AM    Glucose (POC) 156 (H) 01/12/2017 08:02 AM    BUN (POC) 23 (H) 01/12/2017 08:02 AM    Creatinine (POC) 1.1 11/24/2017 08:36 AM    Calcium, ionized (POC) 1.19 01/12/2017 08:02 AM     Lab Results   Component Value Date/Time    Bilirubin, total 0.4 08/09/2018 09:43 AM    ALT (SGPT) 28 08/09/2018 09:43 AM    AST (SGOT) 18 08/09/2018 09:43 AM    Alk. phosphatase 97 08/09/2018 09:43 AM    Protein, total 7.1 08/09/2018 09:43 AM    Albumin 3.5 08/09/2018 09:43 AM    Globulin 3.6 08/09/2018 09:43 AM     Lab Results   Component Value Date/Time    Reticulocyte count 1.8 11/10/2016 02:00 PM    Iron % saturation 11 (L) 11/10/2016 02:00 PM    TIBC 240 (L) 11/10/2016 02:00 PM    Ferritin 100 11/10/2016 02:00 PM    Vitamin B12 415 11/10/2016 02:00 PM    Folate 15.6 11/10/2016 02:00 PM    Haptoglobin 186 11/11/2016 12:04 PM     08/09/2018 09:43 AM    Beta-2 Microglobulin, serum 4.4 (H) 11/15/2016 05:21 AM    TSH 4.06 (H) 11/10/2016 11:21 AM    Hep C  virus Ab Interp. NONREACTIVE 06/14/2018 10:09 AM     Lab Results   Component Value Date/Time    INR 1.0 06/14/2018 10:09 AM    aPTT 31.6 06/14/2018 10:09 AM    Fibrinogen 215 11/11/2016 12:04 PM       HepB/C negative  TTE 11/10/2016: EF 60%    Bone marrow biopsy 11/29/2016: Hypercellular marrow involved by mantle cell lymphoma    Bone marrow biopsy 5/23/17: no evidence of lymphoma    US abdomen 5/29/2018:  1. Increased hepatic echogenicity compatible with diffuse hepatocellular  process, most commonly seen in steatosis. 2. Areas of compromised evaluation due to extensive bowel gas as above with  nonvisualization of gallbladder and common bile duct. CT C/A/P 8/6/2018: No lymphadenopathy in the chest, abdomen or pelvis. Assessment:   1) Mantle cell lymphoma  Stage IV  His MIPI score was 6.24, which is High Risk and cooresponds to a median survival of 37 months and 5-year OS of 20%. He has completed chemotherapy with R-Bendamustine for 6 cycles, achieving a complete response.   He decided against consolidative transplant or maintenance rituximab, and he is currently being followed with surveillance. He has no evidence of disease recurrence at this time. We will continue surveillance. 2) DVT, PE  Diagnosed 11/11/2016. Malignancy associated. Previously on lovenox. Currently, on apixaban BID. I have recommended long-term anticoagulation. 3) Anemia  Intermittent, resolved on last check. Monitor. 4) Thrombocytopenia  Intermittent, mild, resolved on last check. Secondary to splenomegaly/cirrhosis. Monitor. 5) Port care  Continue port flushes every 6 weeks.      Plan:     · Continue apixiban 5mg PO BID  · Port flush every 6 weeks  · Labs every 12 weeks: CBC, CMP, LD  · CT every 6 months (due before next visit)  · Return to clinic in 12 weeks       Signed By: Caitlyn Norton MD

## 2018-11-01 ENCOUNTER — HOSPITAL ENCOUNTER (OUTPATIENT)
Dept: INFUSION THERAPY | Age: 73
Discharge: HOME | End: 2018-11-01
Payer: MEDICARE

## 2018-11-01 ENCOUNTER — OFFICE VISIT (OUTPATIENT)
Dept: ONCOLOGY | Age: 73
End: 2018-11-01

## 2018-11-01 VITALS
DIASTOLIC BLOOD PRESSURE: 98 MMHG | SYSTOLIC BLOOD PRESSURE: 169 MMHG | OXYGEN SATURATION: 94 % | BODY MASS INDEX: 36.68 KG/M2 | HEART RATE: 67 BPM | WEIGHT: 262 LBS | TEMPERATURE: 97.4 F | RESPIRATION RATE: 18 BRPM | HEIGHT: 71 IN

## 2018-11-01 VITALS
DIASTOLIC BLOOD PRESSURE: 84 MMHG | HEART RATE: 71 BPM | TEMPERATURE: 97.9 F | SYSTOLIC BLOOD PRESSURE: 156 MMHG | RESPIRATION RATE: 16 BRPM

## 2018-11-01 DIAGNOSIS — C83.18 MANTLE CELL LYMPHOMA OF LYMPH NODES OF MULTIPLE REGIONS (HCC): Primary | ICD-10-CM

## 2018-11-01 LAB
ALBUMIN SERPL-MCNC: 3.5 G/DL (ref 3.5–5)
ALBUMIN/GLOB SERPL: 1 {RATIO} (ref 1.1–2.2)
ALP SERPL-CCNC: 104 U/L (ref 45–117)
ALT SERPL-CCNC: 33 U/L (ref 12–78)
ANION GAP SERPL CALC-SCNC: 8 MMOL/L (ref 5–15)
AST SERPL-CCNC: 19 U/L (ref 15–37)
BASOPHILS # BLD: 0 K/UL (ref 0–0.1)
BASOPHILS NFR BLD: 1 % (ref 0–1)
BILIRUB SERPL-MCNC: 0.4 MG/DL (ref 0.2–1)
BUN SERPL-MCNC: 17 MG/DL (ref 6–20)
BUN/CREAT SERPL: 15 (ref 12–20)
CALCIUM SERPL-MCNC: 9 MG/DL (ref 8.5–10.1)
CHLORIDE SERPL-SCNC: 106 MMOL/L (ref 97–108)
CO2 SERPL-SCNC: 29 MMOL/L (ref 21–32)
CREAT SERPL-MCNC: 1.17 MG/DL (ref 0.7–1.3)
DIFFERENTIAL METHOD BLD: ABNORMAL
EOSINOPHIL # BLD: 0.3 K/UL (ref 0–0.4)
EOSINOPHIL NFR BLD: 5 % (ref 0–7)
ERYTHROCYTE [DISTWIDTH] IN BLOOD BY AUTOMATED COUNT: 12.5 % (ref 11.5–14.5)
GLOBULIN SER CALC-MCNC: 3.5 G/DL (ref 2–4)
GLUCOSE SERPL-MCNC: 99 MG/DL (ref 65–100)
HCT VFR BLD AUTO: 39.1 % (ref 36.6–50.3)
HGB BLD-MCNC: 12.3 G/DL (ref 12.1–17)
IMM GRANULOCYTES # BLD: 0 K/UL (ref 0–0.04)
IMM GRANULOCYTES NFR BLD AUTO: 1 % (ref 0–0.5)
LDH SERPL L TO P-CCNC: 170 U/L (ref 85–241)
LYMPHOCYTES # BLD: 0.8 K/UL (ref 0.8–3.5)
LYMPHOCYTES NFR BLD: 17 % (ref 12–49)
MCH RBC QN AUTO: 29.4 PG (ref 26–34)
MCHC RBC AUTO-ENTMCNC: 31.5 G/DL (ref 30–36.5)
MCV RBC AUTO: 93.3 FL (ref 80–99)
MONOCYTES # BLD: 0.5 K/UL (ref 0–1)
MONOCYTES NFR BLD: 10 % (ref 5–13)
NEUTS SEG # BLD: 3.4 K/UL (ref 1.8–8)
NEUTS SEG NFR BLD: 67 % (ref 32–75)
NRBC # BLD: 0 K/UL (ref 0–0.01)
NRBC BLD-RTO: 0 PER 100 WBC
PLATELET # BLD AUTO: 166 K/UL (ref 150–400)
PMV BLD AUTO: 9.3 FL (ref 8.9–12.9)
POTASSIUM SERPL-SCNC: 4.3 MMOL/L (ref 3.5–5.1)
PROT SERPL-MCNC: 7 G/DL (ref 6.4–8.2)
RBC # BLD AUTO: 4.19 M/UL (ref 4.1–5.7)
SODIUM SERPL-SCNC: 143 MMOL/L (ref 136–145)
WBC # BLD AUTO: 5 K/UL (ref 4.1–11.1)

## 2018-11-01 PROCEDURE — 80053 COMPREHEN METABOLIC PANEL: CPT | Performed by: NURSE PRACTITIONER

## 2018-11-01 PROCEDURE — 36591 DRAW BLOOD OFF VENOUS DEVICE: CPT

## 2018-11-01 PROCEDURE — 36415 COLL VENOUS BLD VENIPUNCTURE: CPT | Performed by: NURSE PRACTITIONER

## 2018-11-01 PROCEDURE — 83615 LACTATE (LD) (LDH) ENZYME: CPT | Performed by: NURSE PRACTITIONER

## 2018-11-01 PROCEDURE — 74011250636 HC RX REV CODE- 250/636: Performed by: NURSE PRACTITIONER

## 2018-11-01 PROCEDURE — 85025 COMPLETE CBC W/AUTO DIFF WBC: CPT | Performed by: NURSE PRACTITIONER

## 2018-11-01 RX ORDER — SODIUM CHLORIDE 0.9 % (FLUSH) 0.9 %
5-10 SYRINGE (ML) INJECTION AS NEEDED
Status: ACTIVE | OUTPATIENT
Start: 2018-11-01 | End: 2018-11-02

## 2018-11-01 RX ORDER — HEPARIN 100 UNIT/ML
500 SYRINGE INTRAVENOUS AS NEEDED
Status: ACTIVE | OUTPATIENT
Start: 2018-11-01 | End: 2018-11-02

## 2018-11-01 RX ORDER — SODIUM CHLORIDE 9 MG/ML
10 INJECTION INTRAMUSCULAR; INTRAVENOUS; SUBCUTANEOUS AS NEEDED
Status: DISPENSED | OUTPATIENT
Start: 2018-11-01 | End: 2018-11-02

## 2018-11-01 RX ADMIN — SODIUM CHLORIDE 10 ML: 9 INJECTION INTRAMUSCULAR; INTRAVENOUS; SUBCUTANEOUS at 08:35

## 2018-11-01 RX ADMIN — Medication 500 UNITS: at 08:35

## 2018-12-13 ENCOUNTER — HOSPITAL ENCOUNTER (OUTPATIENT)
Dept: INFUSION THERAPY | Age: 73
Discharge: HOME | End: 2018-12-13
Payer: MEDICARE

## 2018-12-13 VITALS
HEART RATE: 71 BPM | SYSTOLIC BLOOD PRESSURE: 142 MMHG | TEMPERATURE: 98.1 F | DIASTOLIC BLOOD PRESSURE: 83 MMHG | RESPIRATION RATE: 16 BRPM

## 2018-12-13 PROCEDURE — 74011250636 HC RX REV CODE- 250/636: Performed by: INTERNAL MEDICINE

## 2018-12-13 PROCEDURE — 96523 IRRIG DRUG DELIVERY DEVICE: CPT

## 2018-12-13 RX ORDER — HEPARIN 100 UNIT/ML
500 SYRINGE INTRAVENOUS AS NEEDED
Status: ACTIVE | OUTPATIENT
Start: 2018-12-13 | End: 2018-12-14

## 2018-12-13 RX ORDER — SODIUM CHLORIDE 9 MG/ML
10 INJECTION INTRAMUSCULAR; INTRAVENOUS; SUBCUTANEOUS AS NEEDED
Status: ACTIVE | OUTPATIENT
Start: 2018-12-13 | End: 2018-12-14

## 2018-12-13 RX ADMIN — SODIUM CHLORIDE 10 ML: 9 INJECTION INTRAMUSCULAR; INTRAVENOUS; SUBCUTANEOUS at 08:52

## 2018-12-13 RX ADMIN — HEPARIN 500 UNITS: 100 SYRINGE at 08:54

## 2018-12-13 NOTE — PROGRESS NOTES
[facility] VISIT NOTE    0845  Pt arrived at [facility] ambulatory and in no distress for port flush. Assessment completed, pt w/o complaints. Rightchest port accessed with 1 in guallpa with no difficulty. Positive blood return noted and labs drawn. Tolerated treatment well, no adverse reaction noted. Port de-accessed and flushed per protocol. Positive blood return noted. Patient Vitals for the past 12 hrs:   Temp Pulse Resp BP   12/13/18 0846 98.1 °F (36.7 °C) 71 16 142/83     0855  D/C'd from [facility] ambulatory and in no distress accompanied by self. Next appointment is 1/24/19 at 0900.

## 2019-01-18 ENCOUNTER — HOSPITAL ENCOUNTER (OUTPATIENT)
Dept: CT IMAGING | Age: 74
Discharge: HOME | End: 2019-01-18
Attending: INTERNAL MEDICINE
Payer: MEDICARE

## 2019-01-18 DIAGNOSIS — C83.18 MANTLE CELL LYMPHOMA OF LYMPH NODES OF MULTIPLE REGIONS (HCC): ICD-10-CM

## 2019-01-18 LAB — CREAT BLD-MCNC: 1.2 MG/DL (ref 0.6–1.3)

## 2019-01-18 PROCEDURE — 74177 CT ABD & PELVIS W/CONTRAST: CPT

## 2019-01-18 PROCEDURE — 82565 ASSAY OF CREATININE: CPT

## 2019-01-18 PROCEDURE — 74011636320 HC RX REV CODE- 636/320: Performed by: RADIOLOGY

## 2019-01-18 PROCEDURE — 74011250636 HC RX REV CODE- 250/636: Performed by: RADIOLOGY

## 2019-01-18 RX ORDER — HEPARIN 100 UNIT/ML
500 SYRINGE INTRAVENOUS AS NEEDED
Status: DISCONTINUED | OUTPATIENT
Start: 2019-01-18 | End: 2019-01-22 | Stop reason: HOSPADM

## 2019-01-18 RX ADMIN — Medication 500 UNITS: at 09:42

## 2019-01-18 RX ADMIN — IOPAMIDOL 100 ML: 755 INJECTION, SOLUTION INTRAVENOUS at 09:33

## 2019-01-24 ENCOUNTER — HOSPITAL ENCOUNTER (OUTPATIENT)
Dept: INFUSION THERAPY | Age: 74
Discharge: HOME | End: 2019-01-24
Payer: MEDICARE

## 2019-01-24 ENCOUNTER — OFFICE VISIT (OUTPATIENT)
Dept: ONCOLOGY | Age: 74
End: 2019-01-24

## 2019-01-24 VITALS
HEIGHT: 71 IN | TEMPERATURE: 97.6 F | SYSTOLIC BLOOD PRESSURE: 153 MMHG | BODY MASS INDEX: 36.68 KG/M2 | HEART RATE: 75 BPM | WEIGHT: 262 LBS | RESPIRATION RATE: 16 BRPM | DIASTOLIC BLOOD PRESSURE: 99 MMHG | OXYGEN SATURATION: 93 %

## 2019-01-24 VITALS
RESPIRATION RATE: 16 BRPM | SYSTOLIC BLOOD PRESSURE: 147 MMHG | TEMPERATURE: 98 F | DIASTOLIC BLOOD PRESSURE: 93 MMHG | HEART RATE: 74 BPM

## 2019-01-24 DIAGNOSIS — I26.99 OTHER PULMONARY EMBOLISM WITHOUT ACUTE COR PULMONALE, UNSPECIFIED CHRONICITY (HCC): ICD-10-CM

## 2019-01-24 DIAGNOSIS — C83.18 MANTLE CELL LYMPHOMA OF LYMPH NODES OF MULTIPLE REGIONS (HCC): Primary | ICD-10-CM

## 2019-01-24 DIAGNOSIS — I82.403 DEEP VENOUS EMBOLISM AND THROMBOSIS OF BOTH LOWER EXTREMITIES (HCC): ICD-10-CM

## 2019-01-24 PROBLEM — E66.01 SEVERE OBESITY (HCC): Status: ACTIVE | Noted: 2019-01-24

## 2019-01-24 LAB
ALBUMIN SERPL-MCNC: 3.8 G/DL (ref 3.5–5)
ALBUMIN/GLOB SERPL: 1 {RATIO} (ref 1.1–2.2)
ALP SERPL-CCNC: 99 U/L (ref 45–117)
ALT SERPL-CCNC: 28 U/L (ref 12–78)
ANION GAP SERPL CALC-SCNC: 8 MMOL/L (ref 5–15)
AST SERPL-CCNC: 20 U/L (ref 15–37)
BASOPHILS # BLD: 0 K/UL (ref 0–0.1)
BASOPHILS NFR BLD: 1 % (ref 0–1)
BILIRUB SERPL-MCNC: 0.6 MG/DL (ref 0.2–1)
BUN SERPL-MCNC: 27 MG/DL (ref 6–20)
BUN/CREAT SERPL: 23 (ref 12–20)
CALCIUM SERPL-MCNC: 9.3 MG/DL (ref 8.5–10.1)
CHLORIDE SERPL-SCNC: 104 MMOL/L (ref 97–108)
CO2 SERPL-SCNC: 29 MMOL/L (ref 21–32)
CREAT SERPL-MCNC: 1.15 MG/DL (ref 0.7–1.3)
DIFFERENTIAL METHOD BLD: NORMAL
EOSINOPHIL # BLD: 0.2 K/UL (ref 0–0.4)
EOSINOPHIL NFR BLD: 5 % (ref 0–7)
ERYTHROCYTE [DISTWIDTH] IN BLOOD BY AUTOMATED COUNT: 12.2 % (ref 11.5–14.5)
GLOBULIN SER CALC-MCNC: 3.7 G/DL (ref 2–4)
GLUCOSE SERPL-MCNC: 97 MG/DL (ref 65–100)
HCT VFR BLD AUTO: 40.4 % (ref 36.6–50.3)
HGB BLD-MCNC: 13 G/DL (ref 12.1–17)
IMM GRANULOCYTES # BLD AUTO: 0 K/UL (ref 0–0.04)
IMM GRANULOCYTES NFR BLD AUTO: 0 % (ref 0–0.5)
LDH SERPL L TO P-CCNC: 182 U/L (ref 85–241)
LYMPHOCYTES # BLD: 1.1 K/UL (ref 0.8–3.5)
LYMPHOCYTES NFR BLD: 22 % (ref 12–49)
MCH RBC QN AUTO: 29.9 PG (ref 26–34)
MCHC RBC AUTO-ENTMCNC: 32.2 G/DL (ref 30–36.5)
MCV RBC AUTO: 92.9 FL (ref 80–99)
MONOCYTES # BLD: 0.6 K/UL (ref 0–1)
MONOCYTES NFR BLD: 12 % (ref 5–13)
NEUTS SEG # BLD: 3 K/UL (ref 1.8–8)
NEUTS SEG NFR BLD: 60 % (ref 32–75)
NRBC # BLD: 0 K/UL (ref 0–0.01)
NRBC BLD-RTO: 0 PER 100 WBC
PLATELET # BLD AUTO: 178 K/UL (ref 150–400)
PMV BLD AUTO: 9.9 FL (ref 8.9–12.9)
POTASSIUM SERPL-SCNC: 4.3 MMOL/L (ref 3.5–5.1)
PROT SERPL-MCNC: 7.5 G/DL (ref 6.4–8.2)
RBC # BLD AUTO: 4.35 M/UL (ref 4.1–5.7)
SODIUM SERPL-SCNC: 141 MMOL/L (ref 136–145)
WBC # BLD AUTO: 5 K/UL (ref 4.1–11.1)

## 2019-01-24 PROCEDURE — 83615 LACTATE (LD) (LDH) ENZYME: CPT

## 2019-01-24 PROCEDURE — 80053 COMPREHEN METABOLIC PANEL: CPT

## 2019-01-24 PROCEDURE — 36415 COLL VENOUS BLD VENIPUNCTURE: CPT

## 2019-01-24 PROCEDURE — 36591 DRAW BLOOD OFF VENOUS DEVICE: CPT

## 2019-01-24 PROCEDURE — 74011250636 HC RX REV CODE- 250/636: Performed by: INTERNAL MEDICINE

## 2019-01-24 PROCEDURE — 77030012965 HC NDL HUBR BBMI -A

## 2019-01-24 PROCEDURE — 85025 COMPLETE CBC W/AUTO DIFF WBC: CPT

## 2019-01-24 RX ORDER — HEPARIN 100 UNIT/ML
500 SYRINGE INTRAVENOUS AS NEEDED
Status: CANCELLED | OUTPATIENT
Start: 2019-03-21

## 2019-01-24 RX ORDER — HEPARIN 100 UNIT/ML
500 SYRINGE INTRAVENOUS AS NEEDED
Status: CANCELLED | OUTPATIENT
Start: 2020-01-02

## 2019-01-24 RX ORDER — HEPARIN 100 UNIT/ML
500 SYRINGE INTRAVENOUS AS NEEDED
Status: CANCELLED | OUTPATIENT
Start: 2020-02-11

## 2019-01-24 RX ORDER — HEPARIN 100 UNIT/ML
500 SYRINGE INTRAVENOUS AS NEEDED
Status: CANCELLED | OUTPATIENT
Start: 2019-11-21

## 2019-01-24 RX ORDER — SODIUM CHLORIDE 9 MG/ML
10 INJECTION INTRAMUSCULAR; INTRAVENOUS; SUBCUTANEOUS AS NEEDED
Status: CANCELLED | OUTPATIENT
Start: 2019-11-21

## 2019-01-24 RX ORDER — SODIUM CHLORIDE 9 MG/ML
10 INJECTION INTRAMUSCULAR; INTRAVENOUS; SUBCUTANEOUS AS NEEDED
Status: CANCELLED | OUTPATIENT
Start: 2019-03-21

## 2019-01-24 RX ORDER — SODIUM CHLORIDE 0.9 % (FLUSH) 0.9 %
5-10 SYRINGE (ML) INJECTION AS NEEDED
Status: CANCELLED | OUTPATIENT
Start: 2019-10-10

## 2019-01-24 RX ORDER — HEPARIN 100 UNIT/ML
500 SYRINGE INTRAVENOUS AS NEEDED
Status: CANCELLED | OUTPATIENT
Start: 2019-05-30

## 2019-01-24 RX ORDER — SODIUM CHLORIDE 0.9 % (FLUSH) 0.9 %
5-10 SYRINGE (ML) INJECTION AS NEEDED
Status: CANCELLED | OUTPATIENT
Start: 2019-03-21

## 2019-01-24 RX ORDER — SODIUM CHLORIDE 0.9 % (FLUSH) 0.9 %
5-10 SYRINGE (ML) INJECTION AS NEEDED
Status: ACTIVE | OUTPATIENT
Start: 2019-01-24 | End: 2019-01-25

## 2019-01-24 RX ORDER — SODIUM CHLORIDE 0.9 % (FLUSH) 0.9 %
5-10 SYRINGE (ML) INJECTION AS NEEDED
Status: CANCELLED | OUTPATIENT
Start: 2019-11-21

## 2019-01-24 RX ORDER — SODIUM CHLORIDE 0.9 % (FLUSH) 0.9 %
5-10 SYRINGE (ML) INJECTION AS NEEDED
Status: CANCELLED | OUTPATIENT
Start: 2019-04-18

## 2019-01-24 RX ORDER — SODIUM CHLORIDE 0.9 % (FLUSH) 0.9 %
5-10 SYRINGE (ML) INJECTION AS NEEDED
Status: CANCELLED | OUTPATIENT
Start: 2020-05-05

## 2019-01-24 RX ORDER — SODIUM CHLORIDE 9 MG/ML
10 INJECTION INTRAMUSCULAR; INTRAVENOUS; SUBCUTANEOUS AS NEEDED
Status: CANCELLED | OUTPATIENT
Start: 2019-05-30

## 2019-01-24 RX ORDER — HEPARIN 100 UNIT/ML
500 SYRINGE INTRAVENOUS AS NEEDED
Status: CANCELLED | OUTPATIENT
Start: 2020-05-05

## 2019-01-24 RX ORDER — SODIUM CHLORIDE 9 MG/ML
10 INJECTION INTRAMUSCULAR; INTRAVENOUS; SUBCUTANEOUS AS NEEDED
Status: CANCELLED | OUTPATIENT
Start: 2019-08-29

## 2019-01-24 RX ORDER — SODIUM CHLORIDE 9 MG/ML
10 INJECTION INTRAMUSCULAR; INTRAVENOUS; SUBCUTANEOUS AS NEEDED
Status: CANCELLED | OUTPATIENT
Start: 2019-10-10

## 2019-01-24 RX ORDER — SODIUM CHLORIDE 0.9 % (FLUSH) 0.9 %
5-10 SYRINGE (ML) INJECTION AS NEEDED
Status: CANCELLED | OUTPATIENT
Start: 2019-08-29

## 2019-01-24 RX ORDER — HEPARIN 100 UNIT/ML
500 SYRINGE INTRAVENOUS AS NEEDED
Status: CANCELLED | OUTPATIENT
Start: 2019-01-24

## 2019-01-24 RX ORDER — SODIUM CHLORIDE 0.9 % (FLUSH) 0.9 %
5-10 SYRINGE (ML) INJECTION AS NEEDED
Status: CANCELLED | OUTPATIENT
Start: 2020-02-11

## 2019-01-24 RX ORDER — HEPARIN 100 UNIT/ML
500 SYRINGE INTRAVENOUS AS NEEDED
Status: CANCELLED | OUTPATIENT
Start: 2019-04-18

## 2019-01-24 RX ORDER — SODIUM CHLORIDE 9 MG/ML
10 INJECTION INTRAMUSCULAR; INTRAVENOUS; SUBCUTANEOUS AS NEEDED
Status: CANCELLED | OUTPATIENT
Start: 2019-07-18

## 2019-01-24 RX ORDER — SODIUM CHLORIDE 0.9 % (FLUSH) 0.9 %
5-10 SYRINGE (ML) INJECTION AS NEEDED
Status: CANCELLED | OUTPATIENT
Start: 2019-07-18

## 2019-01-24 RX ORDER — SODIUM CHLORIDE 9 MG/ML
10 INJECTION INTRAMUSCULAR; INTRAVENOUS; SUBCUTANEOUS AS NEEDED
Status: CANCELLED | OUTPATIENT
Start: 2020-05-05

## 2019-01-24 RX ORDER — HEPARIN 100 UNIT/ML
500 SYRINGE INTRAVENOUS AS NEEDED
Status: CANCELLED | OUTPATIENT
Start: 2020-03-24

## 2019-01-24 RX ORDER — SODIUM CHLORIDE 0.9 % (FLUSH) 0.9 %
5-10 SYRINGE (ML) INJECTION AS NEEDED
Status: CANCELLED | OUTPATIENT
Start: 2020-03-24

## 2019-01-24 RX ORDER — HEPARIN 100 UNIT/ML
500 SYRINGE INTRAVENOUS AS NEEDED
Status: CANCELLED | OUTPATIENT
Start: 2019-07-18

## 2019-01-24 RX ORDER — SODIUM CHLORIDE 9 MG/ML
10 INJECTION INTRAMUSCULAR; INTRAVENOUS; SUBCUTANEOUS AS NEEDED
Status: CANCELLED | OUTPATIENT
Start: 2020-03-24

## 2019-01-24 RX ORDER — SODIUM CHLORIDE 9 MG/ML
10 INJECTION INTRAMUSCULAR; INTRAVENOUS; SUBCUTANEOUS AS NEEDED
Status: CANCELLED | OUTPATIENT
Start: 2020-02-11

## 2019-01-24 RX ORDER — SODIUM CHLORIDE 9 MG/ML
10 INJECTION INTRAMUSCULAR; INTRAVENOUS; SUBCUTANEOUS AS NEEDED
Status: CANCELLED | OUTPATIENT
Start: 2019-01-24

## 2019-01-24 RX ORDER — SODIUM CHLORIDE 0.9 % (FLUSH) 0.9 %
5-10 SYRINGE (ML) INJECTION AS NEEDED
Status: CANCELLED | OUTPATIENT
Start: 2019-01-24

## 2019-01-24 RX ORDER — SODIUM CHLORIDE 0.9 % (FLUSH) 0.9 %
5-10 SYRINGE (ML) INJECTION AS NEEDED
Status: CANCELLED | OUTPATIENT
Start: 2019-05-30

## 2019-01-24 RX ORDER — HEPARIN 100 UNIT/ML
500 SYRINGE INTRAVENOUS AS NEEDED
Status: CANCELLED | OUTPATIENT
Start: 2019-10-10

## 2019-01-24 RX ORDER — SODIUM CHLORIDE 9 MG/ML
10 INJECTION INTRAMUSCULAR; INTRAVENOUS; SUBCUTANEOUS AS NEEDED
Status: CANCELLED | OUTPATIENT
Start: 2019-04-18

## 2019-01-24 RX ORDER — HEPARIN 100 UNIT/ML
500 SYRINGE INTRAVENOUS AS NEEDED
Status: CANCELLED | OUTPATIENT
Start: 2019-08-29

## 2019-01-24 RX ORDER — SODIUM CHLORIDE 9 MG/ML
10 INJECTION INTRAMUSCULAR; INTRAVENOUS; SUBCUTANEOUS AS NEEDED
Status: CANCELLED | OUTPATIENT
Start: 2020-01-02

## 2019-01-24 RX ORDER — SODIUM CHLORIDE 0.9 % (FLUSH) 0.9 %
5-10 SYRINGE (ML) INJECTION AS NEEDED
Status: CANCELLED | OUTPATIENT
Start: 2020-01-02

## 2019-01-24 RX ORDER — HEPARIN 100 UNIT/ML
500 SYRINGE INTRAVENOUS AS NEEDED
Status: ACTIVE | OUTPATIENT
Start: 2019-01-24 | End: 2019-01-25

## 2019-01-24 RX ADMIN — Medication 10 ML: at 09:00

## 2019-01-24 RX ADMIN — Medication 500 UNITS: at 09:00

## 2019-01-24 NOTE — PROGRESS NOTES
Cancer [place] at [place] [address] [address] [facility] W: [phone]  F: [phone] Reason for Visit:  
Tomas Tena is a 74 y.o. male who is seen for follow up of lymphoma. Treatment History: · CTA Chest 11/11/2016: PE in left lower lobe pulmonary arteries, extensive adneopathy · CT A/P 11/12/2016: Cirrhosis, massive splenomegaly, ascites, massive lymphadenopathy · US guided axillary node biopsy 11/14/2016: CD5+ B-cell non-hodgkin lymphoma, phenotype most consistent with mantle cell lymphoma. [address] with t(11;14) · PET-CT 11/23/2016: Marked splenomegaly with increased metabolic activity consistent with lymphoma. Bilateral cervical and axillary adenopathy. Mediastinal and right hilar and left diaphragmatic adenopathy. Bilateral pelvic and inguinal adenopathy. Multiple small mesenteric and retroperitoneal nodes with low grade or no activity. · BMBx 11/29/2016: Hypercellular marrow involved by mantle cell lymphoma · Stage IV Mantle Cell Lymphoma · Palliative chemotherapy with Rituximab and Bendamustine x6 cycles from 12/8/2016 to 5/12/17 · Bone marrow biopsy 5/23/2017: no evidence of lymphoma · PET/CT 6/7/2017: There has been marked improvement in the adenopathy in the cervical region, axilla, mediastinum, abdomen and pelvis which now demonstrate no increased metabolic activity. There has been marked decrease in the spleen which now demonstrates no increased metabolic activity. History of Present Illness:  
Feeling well overall. Pain in left knee due to arthritis. Taking Eliquis without issue. No blood or change in color of stools. No bleeding gums or nose bleeds. No weight change. Eating and drinking normally. No nausea, vomiting, diarrhea or constipation. Staying active at [employer] [facility]. No fever or chills. He is unaccompanied today.   
 
PAST HISTORY: The following sections were reviewed and updated in the EMR as appropriate: PMH, SH, FH, Medications, Allergies. No Known Allergies Review of Systems: A complete review of systems was obtained, reviewed, and scanned into the EMR. Pertinent findings reviewed above. Physical Exam:  
 
Visit Vitals BP (!) 153/99 (BP 1 Location: Right arm, BP Patient Position: Sitting) Comment: . Pulse 75 Temp 97.6 °F (36.4 °C) (Oral) Resp 16 Ht 5' 11\" (1.803 m) Wt 262 lb (118.8 kg) SpO2 93% BMI 36.54 kg/m² ECOG PS: 0 General: No distress Eyes: PERRLA, anicteric sclerae HENT: Atraumatic, OP clear Neck: Supple Lymphatic: No cervical, supraclavicular, or inguinal adenopathy Respiratory: CTAB, normal respiratory effort CV: Normal rate, regular rhythm, no murmurs. No edema noted. GI: Soft, nontender, nondistended, no masses, no hepatomegaly, no splenomegaly MS: Normal gait and station. Digits without clubbing or cyanosis. Skin: No rashes, ecchymoses, or petechiae. Normal temperature, turgor, and texture. Psych: Alert, oriented, appropriate affect, normal judgment/insight Results:  
 
Lab Results Component Value Date/Time WBC 5.0 01/24/2019 08:50 AM  
 HGB 13.0 01/24/2019 08:50 AM  
 HCT 40.4 01/24/2019 08:50 AM  
 PLATELET 435 03/82/7668 08:50 AM  
 MCV 92.9 01/24/2019 08:50 AM  
 ABS. NEUTROPHILS 3.0 01/24/2019 08:50 AM  
 Hemoglobin (POC) 10.9 (L) 01/12/2017 08:02 AM  
 Hematocrit (POC) 32 (L) 01/12/2017 08:02 AM  
 
Lab Results Component Value Date/Time  Sodium 141 01/24/2019 08:50 AM  
 Potassium 4.3 01/24/2019 08:50 AM  
 Chloride 104 01/24/2019 08:50 AM  
 CO2 29 01/24/2019 08:50 AM  
 Glucose 97 01/24/2019 08:50 AM  
 BUN 27 (H) 01/24/2019 08:50 AM  
 Creatinine 1.15 01/24/2019 08:50 AM  
 GFR est AA >60 01/24/2019 08:50 AM  
 GFR est non-AA >60 01/24/2019 08:50 AM  
 Calcium 9.3 01/24/2019 08:50 AM  
 Sodium (POC) 143 01/12/2017 08:02 AM  
 Potassium (POC) 4.0 01/12/2017 08:02 AM  
 Chloride (POC) 104 01/12/2017 08:02 AM  
 Glucose (POC) 156 (H) 01/12/2017 08:02 AM  
 BUN (POC) 23 (H) 01/12/2017 08:02 AM  
 Creatinine (POC) 1.2 01/18/2019 09:25 AM  
 Calcium, ionized (POC) 1.19 01/12/2017 08:02 AM  
 
Lab Results Component Value Date/Time Bilirubin, total 0.6 01/24/2019 08:50 AM  
 ALT (SGPT) 28 01/24/2019 08:50 AM  
 AST (SGOT) 20 01/24/2019 08:50 AM  
 Alk. phosphatase 99 01/24/2019 08:50 AM  
 Protein, total 7.5 01/24/2019 08:50 AM  
 Albumin 3.8 01/24/2019 08:50 AM  
 Globulin 3.7 01/24/2019 08:50 AM  
 
Lab Results Component Value Date/Time Reticulocyte count 1.8 11/10/2016 02:00 PM  
 Iron % saturation 11 (L) 11/10/2016 02:00 PM  
 TIBC 240 (L) 11/10/2016 02:00 PM  
 Ferritin 100 11/10/2016 02:00 PM  
 Vitamin B12 415 11/10/2016 02:00 PM  
 Folate 15.6 11/10/2016 02:00 PM  
 Haptoglobin 186 11/11/2016 12:04 PM  
  01/24/2019 08:50 AM  
 Beta-2 Microglobulin, serum 4.4 (H) 11/15/2016 05:21 AM  
 TSH 4.06 (H) 11/10/2016 11:21 AM  
 Hep C  virus Ab Interp. NONREACTIVE 06/14/2018 10:09 AM  
 
Lab Results Component Value Date/Time INR 1.0 06/14/2018 10:09 AM  
 aPTT 31.6 06/14/2018 10:09 AM  
 Fibrinogen 215 11/11/2016 12:04 PM  
 
 
HepB/C negative TTE 11/10/2016: EF 60% Bone marrow biopsy 11/29/2016: Hypercellular marrow involved by mantle cell lymphoma Bone marrow biopsy 5/23/17: no evidence of lymphoma US abdomen 5/29/2018: 
1. Increased hepatic echogenicity compatible with diffuse hepatocellular 
process, most commonly seen in steatosis. 2. Areas of compromised evaluation due to extensive bowel gas as above with 
nonvisualization of gallbladder and common bile duct. CT C/A/P 8/6/2018: No lymphadenopathy in the chest, abdomen or pelvis. CT C/A/P 1/18/2019: No lymphadenopathy in the chest, abdomen, or pelvis. No significant change. No acute process. Assessment:  
1) Mantle cell lymphoma Stage IV His MIPI score was 6.24, which is High Risk and cooresponds to a median survival of 37 months and 5-year OS of 20%. He has completed chemotherapy with R-Bendamustine for 6 cycles, achieving a complete response. He decided against consolidative transplant or maintenance rituximab, and he is currently being followed with surveillance. He has no evidence of disease recurrence at this time-reviewed results of CT in detail. We will continue surveillance. Plan for repeat imaging in 6 months, then move f/u out to office visits every 6 months and CT scans yearly. 2) DVT, PE 
Diagnosed 11/11/2016. Malignancy associated. Previously on lovenox. Currently, on apixaban BID. I have recommended long-term anticoagulation. Tolerating well. 3) Anemia Intermittent, resolved on last check. Monitor. Repeat CBC pending today. 4) Thrombocytopenia Intermittent, mild, resolved on last check. Secondary to splenomegaly/cirrhosis. Monitor. 5) Port care Continue port flushes every 6 weeks. Plan:  
 
· Continue apixiban 5mg PO BID · Port flush every 6 weeks · Labs every 12 weeks: CBC, CMP, LD 
· CT every 6 months · Return to clinic in 12 weeks Patient was seen in conjunction with Valarie Hatch NP.  
 
Signed By: Nata Isaac MD

## 2019-01-24 NOTE — PROGRESS NOTES
Karrie Farmer is a 74 y.o. male follow up for lymphoma evaluation. 1. Have you been to the ER, urgent care clinic since your last visit? Hospitalized since your last visit? No 
 
2. Have you seen or consulted any other health care providers outside of the [address] [place] [address] since your last visit? Include any pap smears or colon screening.  NO

## 2019-01-24 NOTE — PROGRESS NOTES
[facility] OPI Progress Note Date: 2019 Name: Gelacio Reagan 
 
MRN: [id number] : 1945 Monthly Port flush Mr. Duy Isaac was assessed and education was provided. Mr. Elvi Del Cid vitals were reviewed and patient was observed for 5 minutes prior to procedure. There were no vitals taken for this visit. Mediport was accessed with 20g 0.75inch barrera(s) after chloroprep. R chest, single Blood return:yes 
 
blood collected for labs per protocol. Flushed  NS followed by Heparin 500u Barrera needle(s) removed. Band-Aid applied. Mr. Duy Isaac tolerated the procedure, and had no complaints. Mr. Duy Isaac was discharged from [place] [address] in stable condition at 0900. He is to see MD after [facility] and decide how often to come for future labs/ port flushes. Patient stated he would make appointments before he left.  
 
[facility] 
2019 
7:08 AM

## 2019-02-21 ENCOUNTER — HOSPITAL ENCOUNTER (OUTPATIENT)
Dept: INFUSION THERAPY | Age: 74
Discharge: HOME | End: 2019-02-21
Payer: MEDICARE

## 2019-02-21 VITALS
SYSTOLIC BLOOD PRESSURE: 154 MMHG | TEMPERATURE: 97.5 F | RESPIRATION RATE: 16 BRPM | HEART RATE: 68 BPM | DIASTOLIC BLOOD PRESSURE: 85 MMHG

## 2019-02-21 PROCEDURE — 74011250636 HC RX REV CODE- 250/636: Performed by: INTERNAL MEDICINE

## 2019-02-21 PROCEDURE — 96523 IRRIG DRUG DELIVERY DEVICE: CPT

## 2019-02-21 RX ORDER — SODIUM CHLORIDE 0.9 % (FLUSH) 0.9 %
5-10 SYRINGE (ML) INJECTION AS NEEDED
Status: ACTIVE | OUTPATIENT
Start: 2019-02-21 | End: 2019-02-22

## 2019-02-21 RX ORDER — HEPARIN 100 UNIT/ML
500 SYRINGE INTRAVENOUS AS NEEDED
Status: ACTIVE | OUTPATIENT
Start: 2019-02-21 | End: 2019-02-22

## 2019-02-21 RX ADMIN — Medication 500 UNITS: at 09:14

## 2019-02-21 RX ADMIN — Medication 10 ML: at 09:14

## 2019-02-21 NOTE — PROGRESS NOTES
[facility] Progress Note Date: 2019 Name: Chad Low 
 
MRN: [id number] : 1945 Monthly Port flush Mr. Leslie Casiano was assessed and education was provided. Mr. Reinaldo Torres vitals were reviewed and patient was observed for 5 minutes prior to procedure. Visit Vitals /85 Pulse 68 Temp 97.5 °F (36.4 °C) Resp 16 Mediport was accessed with 20G 0.75inch barrera after chloroprep. R  chest, single/double Blood return:yes Flushed NS followed by Heparin 500u Barrera needle(s) removed. Band-Aid applied. Mr. Leslie Casiano tolerated the procedure, and had no complaints. Mr. Leslie Casiano was discharged from [place] [address] in stable condition. He is to return on 3/21/19 for his next appointment. [facility] 2019 
7:11 AM

## 2019-03-21 ENCOUNTER — HOSPITAL ENCOUNTER (OUTPATIENT)
Dept: INFUSION THERAPY | Age: 74
Discharge: HOME | End: 2019-03-21
Payer: MEDICARE

## 2019-03-21 VITALS
RESPIRATION RATE: 16 BRPM | SYSTOLIC BLOOD PRESSURE: 148 MMHG | DIASTOLIC BLOOD PRESSURE: 85 MMHG | HEART RATE: 67 BPM | TEMPERATURE: 96.7 F

## 2019-03-21 DIAGNOSIS — C83.18 MANTLE CELL LYMPHOMA OF LYMPH NODES OF MULTIPLE REGIONS (HCC): Primary | ICD-10-CM

## 2019-03-21 LAB
ALBUMIN SERPL-MCNC: 3.7 G/DL (ref 3.5–5)
ALBUMIN/GLOB SERPL: 1.1 {RATIO} (ref 1.1–2.2)
ALP SERPL-CCNC: 100 U/L (ref 45–117)
ALT SERPL-CCNC: 37 U/L (ref 12–78)
ANION GAP SERPL CALC-SCNC: 7 MMOL/L (ref 5–15)
AST SERPL-CCNC: 21 U/L (ref 15–37)
BASOPHILS # BLD: 0 K/UL (ref 0–0.1)
BASOPHILS NFR BLD: 1 % (ref 0–1)
BILIRUB SERPL-MCNC: 0.6 MG/DL (ref 0.2–1)
BUN SERPL-MCNC: 16 MG/DL (ref 6–20)
BUN/CREAT SERPL: 13 (ref 12–20)
CALCIUM SERPL-MCNC: 9.2 MG/DL (ref 8.5–10.1)
CHLORIDE SERPL-SCNC: 105 MMOL/L (ref 97–108)
CO2 SERPL-SCNC: 28 MMOL/L (ref 21–32)
CREAT SERPL-MCNC: 1.26 MG/DL (ref 0.7–1.3)
DIFFERENTIAL METHOD BLD: NORMAL
EOSINOPHIL # BLD: 0.2 K/UL (ref 0–0.4)
EOSINOPHIL NFR BLD: 4 % (ref 0–7)
ERYTHROCYTE [DISTWIDTH] IN BLOOD BY AUTOMATED COUNT: 12.4 % (ref 11.5–14.5)
GLOBULIN SER CALC-MCNC: 3.5 G/DL (ref 2–4)
GLUCOSE SERPL-MCNC: 95 MG/DL (ref 65–100)
HCT VFR BLD AUTO: 41.1 % (ref 36.6–50.3)
HGB BLD-MCNC: 13.1 G/DL (ref 12.1–17)
IMM GRANULOCYTES # BLD AUTO: 0 K/UL (ref 0–0.04)
IMM GRANULOCYTES NFR BLD AUTO: 0 % (ref 0–0.5)
LYMPHOCYTES # BLD: 1.1 K/UL (ref 0.8–3.5)
LYMPHOCYTES NFR BLD: 23 % (ref 12–49)
MCH RBC QN AUTO: 29.8 PG (ref 26–34)
MCHC RBC AUTO-ENTMCNC: 31.9 G/DL (ref 30–36.5)
MCV RBC AUTO: 93.4 FL (ref 80–99)
MONOCYTES # BLD: 0.5 K/UL (ref 0–1)
MONOCYTES NFR BLD: 11 % (ref 5–13)
NEUTS SEG # BLD: 2.8 K/UL (ref 1.8–8)
NEUTS SEG NFR BLD: 61 % (ref 32–75)
NRBC # BLD: 0 K/UL (ref 0–0.01)
NRBC BLD-RTO: 0 PER 100 WBC
PLATELET # BLD AUTO: 176 K/UL (ref 150–400)
PMV BLD AUTO: 10 FL (ref 8.9–12.9)
POTASSIUM SERPL-SCNC: 4.1 MMOL/L (ref 3.5–5.1)
PROT SERPL-MCNC: 7.2 G/DL (ref 6.4–8.2)
RBC # BLD AUTO: 4.4 M/UL (ref 4.1–5.7)
SODIUM SERPL-SCNC: 140 MMOL/L (ref 136–145)
WBC # BLD AUTO: 4.6 K/UL (ref 4.1–11.1)

## 2019-03-21 PROCEDURE — 74011250636 HC RX REV CODE- 250/636: Performed by: NURSE PRACTITIONER

## 2019-03-21 PROCEDURE — 36415 COLL VENOUS BLD VENIPUNCTURE: CPT

## 2019-03-21 PROCEDURE — 36591 DRAW BLOOD OFF VENOUS DEVICE: CPT

## 2019-03-21 PROCEDURE — 77030012965 HC NDL HUBR BBMI -A

## 2019-03-21 PROCEDURE — 80053 COMPREHEN METABOLIC PANEL: CPT

## 2019-03-21 PROCEDURE — 85025 COMPLETE CBC W/AUTO DIFF WBC: CPT

## 2019-03-21 RX ORDER — HEPARIN 100 UNIT/ML
500 SYRINGE INTRAVENOUS AS NEEDED
Status: ACTIVE | OUTPATIENT
Start: 2019-03-21 | End: 2019-03-21

## 2019-03-21 RX ORDER — SODIUM CHLORIDE 0.9 % (FLUSH) 0.9 %
5-10 SYRINGE (ML) INJECTION AS NEEDED
Status: ACTIVE | OUTPATIENT
Start: 2019-03-21 | End: 2019-03-21

## 2019-03-21 RX ORDER — SODIUM CHLORIDE 9 MG/ML
10 INJECTION INTRAMUSCULAR; INTRAVENOUS; SUBCUTANEOUS AS NEEDED
Status: ACTIVE | OUTPATIENT
Start: 2019-03-21 | End: 2019-03-21

## 2019-03-21 RX ADMIN — Medication 10 ML: at 09:38

## 2019-03-21 RX ADMIN — Medication 500 UNITS: at 09:38

## 2019-03-21 NOTE — PROGRESS NOTES
[address] [facility] Progress Note    Date: 2019    Name: Milagros Singletary    MRN: [id number]         : 1945    Monthly Port flush     Mr. Cheryle Thapa was assessed and education was provided. Mr. Berkley Castro vitals were reviewed and patient was observed for 5 minutes prior to procedure. Visit Vitals  /85   Pulse 67   Temp 96.7 °F (35.9 °C)   Resp 16       Mediport was accessed with 20G barrera 0.75inc barrera(s) after chloroprep. R chest, single    Blood return: yes     blood collected for labs per protocol. Sent to lab and in process. Flushed NS followed by Heparin 500u    Barrera needle(s) removed. Band-Aid applied. Mr. Cheryle Thapa tolerated the procedure, and had no complaints. Mr. Cheryle Thapa was discharged from [place] [address] in stable condition at. He is to return on 19 for his next appointment.     Betsey Merlos  2019  7:08 AM

## 2019-04-17 NOTE — PROGRESS NOTES
[facility] at [address], [address] [facility]  Amaury Hoop: [phone]  F: [phone]      Reason for Visit:   Dio Brown is a 74 y.o. male who is seen for follow up of lymphoma. Treatment History:   · CTA Chest 11/11/2016: PE in left lower lobe pulmonary arteries, extensive adneopathy  · CT A/P 11/12/2016: Cirrhosis, massive splenomegaly, ascites, massive lymphadenopathy  · US guided axillary node biopsy 11/14/2016: CD5+ B-cell non-hodgkin lymphoma, phenotype most consistent with mantle cell lymphoma. FISH with t(11;14)  · PET-CT 11/23/2016: Marked splenomegaly with increased metabolic activity consistent with lymphoma. Bilateral cervical and axillary adenopathy. Mediastinal and right hilar and left diaphragmatic adenopathy. Bilateral pelvic and inguinal adenopathy. Multiple small mesenteric and retroperitoneal nodes with low grade or no activity. · BMBx 11/29/2016: Hypercellular marrow involved by mantle cell lymphoma  · Stage IV Mantle Cell Lymphoma  · Palliative chemotherapy with Rituximab and Bendamustine x6 cycles from 12/8/2016 to 5/12/17  · Bone marrow biopsy 5/23/2017: no evidence of lymphoma  · PET/CT 6/7/2017: There has been marked improvement in the adenopathy in the cervical region, axilla, mediastinum, abdomen and pelvis which now demonstrate no increased metabolic activity. There has been marked decrease in the spleen which now demonstrates no increased metabolic activity. History of Present Illness:   Has been feeling well. Traveled to [place] for about a week for vacation, visiting friends. Took his significant other with him. Had a good trip. Energy has been normal. No change to bowels. Denies night sweats, hot flashes, fever or chills. He is unaccompanied today. PAST HISTORY: The following sections were reviewed and updated in the EMR as appropriate: PMH, SH, FH, Medications, Allergies.     No Known Allergies     Review of Systems: A complete review of systems was obtained, reviewed, and scanned into the EMR. Pertinent findings reviewed above. Physical Exam:     Visit Vitals  BP (!) 167/95 (BP 1 Location: Right arm, BP Patient Position: Sitting) Comment: . Pulse 70   Temp 96.8 °F (36 °C) (Temporal)   Resp 16   Ht 5' 11\" (1.803 m)   Wt 268 lb (121.6 kg)   SpO2 94%   BMI 37.38 kg/m²     ECOG PS: 0  General: No distress  Eyes: PERRLA, anicteric sclerae  HENT: Atraumatic, OP clear  Neck: Supple  Lymphatic: No cervical, supraclavicular, or inguinal adenopathy  Respiratory: CTAB, normal respiratory effort  CV: Normal rate, regular rhythm, no murmurs. No edema noted. GI: Soft, nontender, nondistended, no masses, no hepatomegaly, no splenomegaly  MS: Normal gait and station. Digits without clubbing or cyanosis. Skin: No rashes, ecchymoses, or petechiae. Normal temperature, turgor, and texture. Psych: Alert, oriented, appropriate affect, normal judgment/insight    Results:     Lab Results   Component Value Date/Time    WBC 4.6 03/21/2019 09:37 AM    HGB 13.1 03/21/2019 09:37 AM    HCT 41.1 03/21/2019 09:37 AM    PLATELET 950 26/76/2055 09:37 AM    MCV 93.4 03/21/2019 09:37 AM    ABS.  NEUTROPHILS 2.8 03/21/2019 09:37 AM    Hemoglobin (POC) 10.9 (L) 01/12/2017 08:02 AM    Hematocrit (POC) 32 (L) 01/12/2017 08:02 AM     Lab Results   Component Value Date/Time    Sodium 140 03/21/2019 09:37 AM    Potassium 4.1 03/21/2019 09:37 AM    Chloride 105 03/21/2019 09:37 AM    CO2 28 03/21/2019 09:37 AM    Glucose 95 03/21/2019 09:37 AM    BUN 16 03/21/2019 09:37 AM    Creatinine 1.26 03/21/2019 09:37 AM    GFR est AA >60 03/21/2019 09:37 AM    GFR est non-AA 56 (L) 03/21/2019 09:37 AM    Calcium 9.2 03/21/2019 09:37 AM    Sodium (POC) 143 01/12/2017 08:02 AM    Potassium (POC) 4.0 01/12/2017 08:02 AM    Chloride (POC) 104 01/12/2017 08:02 AM    Glucose (POC) 156 (H) 01/12/2017 08:02 AM    BUN (POC) 23 (H) 01/12/2017 08:02 AM    Creatinine (POC) 1.2 01/18/2019 09:25 AM    Calcium, ionized (POC) 1.19 01/12/2017 08:02 AM     Lab Results   Component Value Date/Time    Bilirubin, total 0.6 03/21/2019 09:37 AM    ALT (SGPT) 37 03/21/2019 09:37 AM    AST (SGOT) 21 03/21/2019 09:37 AM    Alk. phosphatase 100 03/21/2019 09:37 AM    Protein, total 7.2 03/21/2019 09:37 AM    Albumin 3.7 03/21/2019 09:37 AM    Globulin 3.5 03/21/2019 09:37 AM     Lab Results   Component Value Date/Time    Reticulocyte count 1.8 11/10/2016 02:00 PM    Iron % saturation 11 (L) 11/10/2016 02:00 PM    TIBC 240 (L) 11/10/2016 02:00 PM    Ferritin 100 11/10/2016 02:00 PM    Vitamin B12 415 11/10/2016 02:00 PM    Folate 15.6 11/10/2016 02:00 PM    Haptoglobin 186 11/11/2016 12:04 PM     01/24/2019 08:50 AM    Beta-2 Microglobulin, serum 4.4 (H) 11/15/2016 05:21 AM    TSH 4.06 (H) 11/10/2016 11:21 AM    Hep C  virus Ab Interp. NONREACTIVE 06/14/2018 10:09 AM     Lab Results   Component Value Date/Time    INR 1.0 06/14/2018 10:09 AM    aPTT 31.6 06/14/2018 10:09 AM    Fibrinogen 215 11/11/2016 12:04 PM       HepB/C negative  TTE 11/10/2016: EF 60%    Bone marrow biopsy 11/29/2016: Hypercellular marrow involved by mantle cell lymphoma    Bone marrow biopsy 5/23/17: no evidence of lymphoma    US abdomen 5/29/2018:  1. Increased hepatic echogenicity compatible with diffuse hepatocellular  process, most commonly seen in steatosis. 2. Areas of compromised evaluation due to extensive bowel gas as above with  nonvisualization of gallbladder and common bile duct. CT C/A/P 8/6/2018: No lymphadenopathy in the chest, abdomen or pelvis. CT C/A/P 1/18/2019: No lymphadenopathy in the chest, abdomen, or pelvis. No significant change. No acute process. Assessment:   1) Mantle cell lymphoma  Stage IV  His MIPI score was 6.24, which is High Risk and cooresponds to a median survival of 37 months and 5-year OS of 20%.   He has completed chemotherapy with R-Bendamustine for 6 cycles, achieving a complete response. He decided against consolidative transplant or maintenance rituximab, and he is currently being followed with surveillance. He has no evidence of disease recurrence at this time. We will continue surveillance. Plan for repeat imaging in 3 months, then move f/u out to office visits every 6 months and CT scans yearly. 2) DVT, PE  Diagnosed 11/11/2016. Malignancy associated. Previously on lovenox. Currently, on apixaban BID. I have recommended long-term anticoagulation. Tolerating well. 3) Thrombocytopenia  Intermittent, mild, resolved on last check. Secondary to splenomegaly/cirrhosis. Monitor. 4) Port care  Continue port flushes every 6 weeks. Consider removing if next imaging is without recurrent disease. Now he prefers to keep it in place. Plan:     · Continue apixiban 5mg PO BID  · Port flush every 6 weeks  · Labs every 12 weeks: CBC, CMP, LD  · CT C/A/P in 3 months   · Return to clinic in 12 weeks     Patient was seen in conjunction with Karrie Swain NP.     Signed By: Jose Roman MD

## 2019-04-18 ENCOUNTER — OFFICE VISIT (OUTPATIENT)
Dept: ONCOLOGY | Age: 74
End: 2019-04-18

## 2019-04-18 ENCOUNTER — HOSPITAL ENCOUNTER (OUTPATIENT)
Dept: INFUSION THERAPY | Age: 74
Discharge: HOME | End: 2019-04-18
Payer: MEDICARE

## 2019-04-18 VITALS
TEMPERATURE: 97.9 F | HEART RATE: 71 BPM | DIASTOLIC BLOOD PRESSURE: 85 MMHG | RESPIRATION RATE: 18 BRPM | SYSTOLIC BLOOD PRESSURE: 162 MMHG

## 2019-04-18 VITALS
WEIGHT: 268 LBS | RESPIRATION RATE: 16 BRPM | OXYGEN SATURATION: 94 % | DIASTOLIC BLOOD PRESSURE: 95 MMHG | TEMPERATURE: 96.8 F | HEART RATE: 70 BPM | HEIGHT: 71 IN | SYSTOLIC BLOOD PRESSURE: 167 MMHG | BODY MASS INDEX: 37.52 KG/M2

## 2019-04-18 DIAGNOSIS — C83.18 MANTLE CELL LYMPHOMA OF LYMPH NODES OF MULTIPLE REGIONS (HCC): Primary | ICD-10-CM

## 2019-04-18 DIAGNOSIS — I26.99 OTHER PULMONARY EMBOLISM WITHOUT ACUTE COR PULMONALE, UNSPECIFIED CHRONICITY (HCC): ICD-10-CM

## 2019-04-18 DIAGNOSIS — I82.403 DEEP VENOUS EMBOLISM AND THROMBOSIS OF BOTH LOWER EXTREMITIES (HCC): ICD-10-CM

## 2019-04-18 PROCEDURE — 74011000250 HC RX REV CODE- 250: Performed by: NURSE PRACTITIONER

## 2019-04-18 PROCEDURE — 77030012965 HC NDL HUBR BBMI -A

## 2019-04-18 PROCEDURE — 74011250636 HC RX REV CODE- 250/636: Performed by: NURSE PRACTITIONER

## 2019-04-18 PROCEDURE — 96523 IRRIG DRUG DELIVERY DEVICE: CPT

## 2019-04-18 RX ORDER — SODIUM CHLORIDE 0.9 % (FLUSH) 0.9 %
5-10 SYRINGE (ML) INJECTION AS NEEDED
Status: ACTIVE | OUTPATIENT
Start: 2019-04-18 | End: 2019-04-18

## 2019-04-18 RX ORDER — LIDOCAINE AND PRILOCAINE 25; 25 MG/G; MG/G
CREAM TOPICAL AS NEEDED
Qty: 90 G | Refills: 3 | Status: SHIPPED | OUTPATIENT
Start: 2019-04-18

## 2019-04-18 RX ORDER — SODIUM CHLORIDE 9 MG/ML
10 INJECTION INTRAMUSCULAR; INTRAVENOUS; SUBCUTANEOUS AS NEEDED
Status: ACTIVE | OUTPATIENT
Start: 2019-04-18 | End: 2019-04-18

## 2019-04-18 RX ORDER — HEPARIN 100 UNIT/ML
500 SYRINGE INTRAVENOUS AS NEEDED
Status: ACTIVE | OUTPATIENT
Start: 2019-04-18 | End: 2019-04-18

## 2019-04-18 RX ADMIN — Medication 500 UNITS: at 09:35

## 2019-04-18 RX ADMIN — SODIUM CHLORIDE 10 ML: 9 INJECTION, SOLUTION INTRAMUSCULAR; INTRAVENOUS; SUBCUTANEOUS at 09:33

## 2019-04-18 RX ADMIN — Medication 10 ML: at 09:35

## 2019-04-18 NOTE — PROGRESS NOTES
[facility] Progress Note    Date: 2019    Name: Isidoro Rojas    MRN: [id number]         : 1945    Mr. Hernandez Arrived ambulatory and in no distress for Port flush regimen. Assessment was completed, no acute issues at this time, no new complaints voiced. Port accessed without difficulty. Mr. Jhoana Jean-Baptiste vitals were reviewed. Patient Vitals for the past 12 hrs:   Temp Pulse Resp BP   19 0931 97.9 °F (36.6 °C) 71 18 162/85       Mr. Hernandez tolerated treatment well and was discharged from [place] [address] in stable condition. Port de-accessed, flushed & heparinized per protocol. He is to return on 19 at 10:30 for his next appointment.     Svetlana Polk RN  2019

## 2019-04-18 NOTE — PROGRESS NOTES
Rita Alicea is a 74 y.o. male follow up for lymphoma. .    1. Have you been to the ER, urgent care clinic since your last visit? Hospitalized since your last visit?no    2. Have you seen or consulted any other health care providers outside of the [address] [place] [address] since your last visit? Include any pap smears or colon screening.  No

## 2019-05-16 ENCOUNTER — HOSPITAL ENCOUNTER (OUTPATIENT)
Dept: INFUSION THERAPY | Age: 74
Discharge: HOME | End: 2019-05-16
Payer: MEDICARE

## 2019-05-30 ENCOUNTER — HOSPITAL ENCOUNTER (OUTPATIENT)
Dept: INFUSION THERAPY | Age: 74
Discharge: HOME | End: 2019-05-30
Payer: MEDICARE

## 2019-05-30 VITALS
DIASTOLIC BLOOD PRESSURE: 92 MMHG | SYSTOLIC BLOOD PRESSURE: 156 MMHG | TEMPERATURE: 96.1 F | RESPIRATION RATE: 18 BRPM | HEART RATE: 88 BPM

## 2019-05-30 DIAGNOSIS — C83.18 MANTLE CELL LYMPHOMA OF LYMPH NODES OF MULTIPLE REGIONS (HCC): Primary | ICD-10-CM

## 2019-05-30 PROCEDURE — 77030012965 HC NDL HUBR BBMI -A

## 2019-05-30 PROCEDURE — 96523 IRRIG DRUG DELIVERY DEVICE: CPT

## 2019-05-30 PROCEDURE — 74011250636 HC RX REV CODE- 250/636: Performed by: NURSE PRACTITIONER

## 2019-05-30 PROCEDURE — 74011000250 HC RX REV CODE- 250: Performed by: NURSE PRACTITIONER

## 2019-05-30 RX ORDER — SODIUM CHLORIDE 9 MG/ML
10 INJECTION INTRAMUSCULAR; INTRAVENOUS; SUBCUTANEOUS AS NEEDED
Status: ACTIVE | OUTPATIENT
Start: 2019-05-30 | End: 2019-05-30

## 2019-05-30 RX ORDER — HEPARIN 100 UNIT/ML
500 SYRINGE INTRAVENOUS AS NEEDED
Status: ACTIVE | OUTPATIENT
Start: 2019-05-30 | End: 2019-05-30

## 2019-05-30 RX ORDER — SODIUM CHLORIDE 0.9 % (FLUSH) 0.9 %
5-10 SYRINGE (ML) INJECTION AS NEEDED
Status: ACTIVE | OUTPATIENT
Start: 2019-05-30 | End: 2019-05-30

## 2019-05-30 RX ADMIN — Medication 500 UNITS: at 10:47

## 2019-05-30 RX ADMIN — SODIUM CHLORIDE 10 ML: 9 INJECTION, SOLUTION INTRAMUSCULAR; INTRAVENOUS; SUBCUTANEOUS at 10:47

## 2019-05-30 NOTE — PROGRESS NOTES
[facility] VISIT NOTE    Pt arrived at [facility] ambulatory and in no distress for [facility]. Assessment completed, pt had no complaints . Patient Vitals for the past 12 hrs:   Temp Pulse Resp BP   05/30/19 1024 96.1 °F (35.6 °C) 88 18 (!) 156/92     Right chest port accessed with . 75  in guallpa no difficulty. Positive blood return noted. Tolerated treatment well, no adverse reaction noted. Port de-accessed and flushed per protocol. Positive blood return noted. D/C'd from [facility] ambulatory and in no distress. Next appointment is 6/13/19 at 10:30.

## 2019-06-13 ENCOUNTER — APPOINTMENT (OUTPATIENT)
Dept: INFUSION THERAPY | Age: 74
End: 2019-06-13

## 2019-07-11 ENCOUNTER — HOSPITAL ENCOUNTER (OUTPATIENT)
Dept: CT IMAGING | Age: 74
Discharge: HOME | End: 2019-07-11
Attending: NURSE PRACTITIONER
Payer: MEDICARE

## 2019-07-11 DIAGNOSIS — C83.18 MANTLE CELL LYMPHOMA OF LYMPH NODES OF MULTIPLE REGIONS (HCC): ICD-10-CM

## 2019-07-11 LAB — CREAT BLD-MCNC: 1 MG/DL (ref 0.6–1.3)

## 2019-07-11 PROCEDURE — 82565 ASSAY OF CREATININE: CPT

## 2019-07-11 PROCEDURE — 74177 CT ABD & PELVIS W/CONTRAST: CPT

## 2019-07-11 PROCEDURE — 77030003560 HC NDL HUBR BARD -A

## 2019-07-11 PROCEDURE — 74011636320 HC RX REV CODE- 636/320: Performed by: RADIOLOGY

## 2019-07-11 RX ORDER — HEPARIN 100 UNIT/ML
500 SYRINGE INTRAVENOUS
Status: DISPENSED | OUTPATIENT
Start: 2019-07-11 | End: 2019-07-11

## 2019-07-11 RX ADMIN — IOPAMIDOL 100 ML: 755 INJECTION, SOLUTION INTRAVENOUS at 07:50

## 2019-07-18 ENCOUNTER — OFFICE VISIT (OUTPATIENT)
Dept: ONCOLOGY | Age: 74
End: 2019-07-18

## 2019-07-18 ENCOUNTER — HOSPITAL ENCOUNTER (OUTPATIENT)
Dept: INFUSION THERAPY | Age: 74
Discharge: HOME | End: 2019-07-18
Payer: MEDICARE

## 2019-07-18 VITALS
OXYGEN SATURATION: 92 % | DIASTOLIC BLOOD PRESSURE: 93 MMHG | HEART RATE: 71 BPM | RESPIRATION RATE: 20 BRPM | TEMPERATURE: 97.5 F | BODY MASS INDEX: 38.22 KG/M2 | SYSTOLIC BLOOD PRESSURE: 161 MMHG | WEIGHT: 273 LBS | HEIGHT: 71 IN

## 2019-07-18 DIAGNOSIS — C83.18 MANTLE CELL LYMPHOMA OF LYMPH NODES OF MULTIPLE REGIONS (HCC): Primary | ICD-10-CM

## 2019-07-18 DIAGNOSIS — I26.99 OTHER PULMONARY EMBOLISM WITHOUT ACUTE COR PULMONALE, UNSPECIFIED CHRONICITY (HCC): ICD-10-CM

## 2019-07-18 DIAGNOSIS — I82.403 DEEP VENOUS EMBOLISM AND THROMBOSIS OF BOTH LOWER EXTREMITIES (HCC): ICD-10-CM

## 2019-07-18 DIAGNOSIS — E66.01 SEVERE OBESITY (HCC): ICD-10-CM

## 2019-07-18 LAB
ALBUMIN SERPL-MCNC: 3.6 G/DL (ref 3.5–5)
ALBUMIN/GLOB SERPL: 1.1 {RATIO} (ref 1.1–2.2)
ALP SERPL-CCNC: 101 U/L (ref 45–117)
ALT SERPL-CCNC: 46 U/L (ref 12–78)
ANION GAP SERPL CALC-SCNC: 3 MMOL/L (ref 5–15)
AST SERPL-CCNC: 27 U/L (ref 15–37)
BASOPHILS # BLD: 0 K/UL (ref 0–0.1)
BASOPHILS NFR BLD: 0 % (ref 0–1)
BILIRUB SERPL-MCNC: 0.6 MG/DL (ref 0.2–1)
BUN SERPL-MCNC: 19 MG/DL (ref 6–20)
BUN/CREAT SERPL: 16 (ref 12–20)
CALCIUM SERPL-MCNC: 8.5 MG/DL (ref 8.5–10.1)
CHLORIDE SERPL-SCNC: 106 MMOL/L (ref 97–108)
CO2 SERPL-SCNC: 31 MMOL/L (ref 21–32)
CREAT SERPL-MCNC: 1.18 MG/DL (ref 0.7–1.3)
DIFFERENTIAL METHOD BLD: NORMAL
EOSINOPHIL # BLD: 0.2 K/UL (ref 0–0.4)
EOSINOPHIL NFR BLD: 4 % (ref 0–7)
ERYTHROCYTE [DISTWIDTH] IN BLOOD BY AUTOMATED COUNT: 12.4 % (ref 11.5–14.5)
GLOBULIN SER CALC-MCNC: 3.3 G/DL (ref 2–4)
GLUCOSE SERPL-MCNC: 161 MG/DL (ref 65–100)
HCT VFR BLD AUTO: 40.4 % (ref 36.6–50.3)
HGB BLD-MCNC: 13.1 G/DL (ref 12.1–17)
IMM GRANULOCYTES # BLD AUTO: 0 K/UL (ref 0–0.04)
IMM GRANULOCYTES NFR BLD AUTO: 0 % (ref 0–0.5)
LYMPHOCYTES # BLD: 0.8 K/UL (ref 0.8–3.5)
LYMPHOCYTES NFR BLD: 18 % (ref 12–49)
MCH RBC QN AUTO: 30.3 PG (ref 26–34)
MCHC RBC AUTO-ENTMCNC: 32.4 G/DL (ref 30–36.5)
MCV RBC AUTO: 93.5 FL (ref 80–99)
MONOCYTES # BLD: 0.5 K/UL (ref 0–1)
MONOCYTES NFR BLD: 10 % (ref 5–13)
NEUTS SEG # BLD: 3 K/UL (ref 1.8–8)
NEUTS SEG NFR BLD: 68 % (ref 32–75)
NRBC # BLD: 0 K/UL (ref 0–0.01)
NRBC BLD-RTO: 0 PER 100 WBC
PLATELET # BLD AUTO: 161 K/UL (ref 150–400)
PMV BLD AUTO: 9.7 FL (ref 8.9–12.9)
POTASSIUM SERPL-SCNC: 4 MMOL/L (ref 3.5–5.1)
PROT SERPL-MCNC: 6.9 G/DL (ref 6.4–8.2)
RBC # BLD AUTO: 4.32 M/UL (ref 4.1–5.7)
RBC MORPH BLD: NORMAL
SODIUM SERPL-SCNC: 140 MMOL/L (ref 136–145)
WBC # BLD AUTO: 4.5 K/UL (ref 4.1–11.1)

## 2019-07-18 PROCEDURE — 77030012965 HC NDL HUBR BBMI -A

## 2019-07-18 PROCEDURE — 36415 COLL VENOUS BLD VENIPUNCTURE: CPT

## 2019-07-18 PROCEDURE — 80053 COMPREHEN METABOLIC PANEL: CPT

## 2019-07-18 PROCEDURE — 85025 COMPLETE CBC W/AUTO DIFF WBC: CPT

## 2019-07-18 PROCEDURE — 74011250636 HC RX REV CODE- 250/636: Performed by: NURSE PRACTITIONER

## 2019-07-18 PROCEDURE — 36591 DRAW BLOOD OFF VENOUS DEVICE: CPT

## 2019-07-18 RX ORDER — SODIUM CHLORIDE 0.9 % (FLUSH) 0.9 %
5-10 SYRINGE (ML) INJECTION AS NEEDED
Status: ACTIVE | OUTPATIENT
Start: 2019-07-18 | End: 2019-07-18

## 2019-07-18 RX ORDER — HEPARIN 100 UNIT/ML
500 SYRINGE INTRAVENOUS AS NEEDED
Status: ACTIVE | OUTPATIENT
Start: 2019-07-18 | End: 2019-07-18

## 2019-07-18 RX ORDER — SODIUM CHLORIDE 9 MG/ML
10 INJECTION INTRAMUSCULAR; INTRAVENOUS; SUBCUTANEOUS AS NEEDED
Status: ACTIVE | OUTPATIENT
Start: 2019-07-18 | End: 2019-07-18

## 2019-07-18 RX ADMIN — SODIUM CHLORIDE 10 ML: 9 INJECTION INTRAMUSCULAR; INTRAVENOUS; SUBCUTANEOUS at 10:50

## 2019-07-18 RX ADMIN — Medication 500 UNITS: at 10:50

## 2019-07-18 NOTE — PROGRESS NOTES
Keyanna Sen is a 66 y.o. male follow up for lymphoma. 1. Have you been to the ER, urgent care clinic since your last visit? Hospitalized since your last visit?no     2. Have you seen or consulted any other health care providers outside of the [address] [place] [address] since your last visit? Include any pap smears or colon screening.  No

## 2019-07-18 NOTE — PROGRESS NOTES
[facility] [place] at [address], [address] [facility] 100[address] [facility]  W: [phone]  F: [phone]      Reason for Visit:   Melissa Espinosa is a 66 y.o. male who is seen for follow up of lymphoma. Treatment History:   · CTA Chest 11/11/2016: PE in left lower lobe pulmonary arteries, extensive adneopathy  · CT A/P 11/12/2016: Cirrhosis, massive splenomegaly, ascites, massive lymphadenopathy  · US guided axillary node biopsy 11/14/2016: CD5+ B-cell non-hodgkin lymphoma, phenotype most consistent with mantle cell lymphoma. FISH with t(11;14)  · PET-CT 11/23/2016: Marked splenomegaly with increased metabolic activity consistent with lymphoma. Bilateral cervical and axillary adenopathy. Mediastinal and right hilar and left diaphragmatic adenopathy. Bilateral pelvic and inguinal adenopathy. Multiple small mesenteric and retroperitoneal nodes with low grade or no activity. · BMBx 11/29/2016: Hypercellular marrow involved by mantle cell lymphoma  · Stage IV Mantle Cell Lymphoma  · Palliative chemotherapy with Rituximab and Bendamustine x6 cycles from 12/8/2016 to 5/12/17  · Bone marrow biopsy 5/23/2017: no evidence of lymphoma  · PET/CT 6/7/2017: There has been marked improvement in the adenopathy in the cervical region, axilla, mediastinum, abdomen and pelvis which now demonstrate no increased metabolic activity. There has been marked decrease in the spleen which now demonstrates no increased metabolic activity. History of Present Illness:   Feeling very well. No nausea or vomiting. No chest pain, dizziness, headaches. No SOB/BOND. No fever or chills. Some arthritis pain in legs and muscle fatigue at times, but this is unchanged. Appetite has been great, no changes. No change in bowels. No night sweats. He is unaccompanied today. PAST HISTORY: The following sections were reviewed and updated in the EMR as appropriate: PMH, SH, FH, Medications, Allergies.     No Known Allergies     Review of Systems: A complete review of systems was obtained, reviewed, and scanned into the EMR. Pertinent findings reviewed above. Physical Exam:     Visit Vitals  BP (!) 161/93 (BP 1 Location: Right arm, BP Patient Position: Sitting)   Pulse 71   Temp 97.5 °F (36.4 °C) (Temporal)   Resp 20   Ht 5' 11\" (1.803 m)   Wt 273 lb (123.8 kg)   SpO2 92%   BMI 38.08 kg/m²     ECOG PS: 0  General: No distress  Eyes: PERRLA, anicteric sclerae  HENT: Atraumatic, OP clear  Neck: Supple  Lymphatic: No cervical, supraclavicular, or inguinal adenopathy  Respiratory: CTAB, normal respiratory effort  CV: Normal rate, regular rhythm, no murmurs. No edema noted. GI: Soft, nontender, nondistended, no masses, no hepatomegaly, no splenomegaly  MS: Normal gait and station. Digits without clubbing or cyanosis. Skin: No rashes, ecchymoses, or petechiae. Normal temperature, turgor, and texture. Psych: Alert, oriented, appropriate affect, normal judgment/insight    Results:     Lab Results   Component Value Date/Time    WBC 4.5 07/18/2019 10:49 AM    HGB 13.1 07/18/2019 10:49 AM    HCT 40.4 07/18/2019 10:49 AM    PLATELET 148 68/17/0145 10:49 AM    MCV 93.5 07/18/2019 10:49 AM    ABS.  NEUTROPHILS 3.0 07/18/2019 10:49 AM    Hemoglobin (POC) 10.9 (L) 01/12/2017 08:02 AM    Hematocrit (POC) 32 (L) 01/12/2017 08:02 AM     Lab Results   Component Value Date/Time    Sodium 140 07/18/2019 10:49 AM    Potassium 4.0 07/18/2019 10:49 AM    Chloride 106 07/18/2019 10:49 AM    CO2 31 07/18/2019 10:49 AM    Glucose 161 (H) 07/18/2019 10:49 AM    BUN 19 07/18/2019 10:49 AM    Creatinine 1.18 07/18/2019 10:49 AM    GFR est AA >60 07/18/2019 10:49 AM    GFR est non-AA >60 07/18/2019 10:49 AM    Calcium 8.5 07/18/2019 10:49 AM    Sodium (POC) 143 01/12/2017 08:02 AM    Potassium (POC) 4.0 01/12/2017 08:02 AM    Chloride (POC) 104 01/12/2017 08:02 AM    Glucose (POC) 156 (H) 01/12/2017 08:02 AM    BUN (POC) 23 (H) 01/12/2017 08:02 AM Creatinine (POC) 1.0 07/11/2019 07:47 AM    Calcium, ionized (POC) 1.19 01/12/2017 08:02 AM     Lab Results   Component Value Date/Time    Bilirubin, total 0.6 07/18/2019 10:49 AM    ALT (SGPT) 46 07/18/2019 10:49 AM    AST (SGOT) 27 07/18/2019 10:49 AM    Alk. phosphatase 101 07/18/2019 10:49 AM    Protein, total 6.9 07/18/2019 10:49 AM    Albumin 3.6 07/18/2019 10:49 AM    Globulin 3.3 07/18/2019 10:49 AM     Lab Results   Component Value Date/Time    Reticulocyte count 1.8 11/10/2016 02:00 PM    Iron % saturation 11 (L) 11/10/2016 02:00 PM    TIBC 240 (L) 11/10/2016 02:00 PM    Ferritin 100 11/10/2016 02:00 PM    Vitamin B12 415 11/10/2016 02:00 PM    Folate 15.6 11/10/2016 02:00 PM    Haptoglobin 186 11/11/2016 12:04 PM     01/24/2019 08:50 AM    Beta-2 Microglobulin, serum 4.4 (H) 11/15/2016 05:21 AM    TSH 4.06 (H) 11/10/2016 11:21 AM    Hep C  virus Ab Interp. NONREACTIVE 06/14/2018 10:09 AM     Lab Results   Component Value Date/Time    INR 1.0 06/14/2018 10:09 AM    aPTT 31.6 06/14/2018 10:09 AM    Fibrinogen 215 11/11/2016 12:04 PM       HepB/C negative  TTE 11/10/2016: EF 60%    Bone marrow biopsy 11/29/2016: Hypercellular marrow involved by mantle cell lymphoma    Bone marrow biopsy 5/23/17: no evidence of lymphoma    US abdomen 5/29/2018:  1. Increased hepatic echogenicity compatible with diffuse hepatocellular  process, most commonly seen in steatosis. 2. Areas of compromised evaluation due to extensive bowel gas as above with  nonvisualization of gallbladder and common bile duct. CT C/A/P 8/6/2018: No lymphadenopathy in the chest, abdomen or pelvis. CT C/A/P 1/18/2019: No lymphadenopathy in the chest, abdomen, or pelvis. No significant change. No acute process. CT C/A/P 7/11/2019:  No evidence of lymphadenopathy. Pleural-based nodule left upper lobe unchanged.  Cholelithiasis    Assessment:   1) Mantle cell lymphoma  Stage IV  His MIPI score was 6.24, which is High Risk and cooresponds to a median survival of 37 months and 5-year OS of 20%. He has completed chemotherapy with R-Bendamustine for 6 cycles, achieving a complete response. He decided against consolidative transplant or maintenance rituximab, and he is currently being followed with surveillance. He has no evidence of disease recurrence at this time. We will continue surveillance. Will move f/u out to office visits every 6 months and CT scans yearly now as he is 2 years out from therapy. 2) DVT, PE  Diagnosed 11/11/2016. Malignancy associated. Previously on lovenox. Currently, on apixaban BID. I have recommended long-term anticoagulation. However, if his malignancy does not recur, we could consider stopping this in the future. Tolerating well. 3) Thrombocytopenia  Intermittent, mild, resolved on last check. Secondary to splenomegaly/cirrhosis. Monitor. 4) Port care  Continue port flushes every 6 weeks. Consider removing if next imaging is without recurrent disease. Now he prefers to keep it in place. Plan:     · Continue apixiban 5mg PO BID  · Port flush every 6 weeks  · Labs every 12 weeks: CBC, CMP, LD  · CT C/A/P in 12months  · Return to clinic in 6 months    Patient was seen in conjunction with Abel Baez NP.     Signed By: Delaney Kay MD

## 2019-07-19 NOTE — PROGRESS NOTES
[facility] Short Visit Progress Note    1958 Patient admitted to [facility] for labs/Port flush ambulatory in stable condition. Assessment completed. No new concerns voiced. Right chest port accessed without difficulty. Labs drawn. Flushed per protocol and guallpa removed. Vital Signs:  Taken at MD office following [facility] visit    Lab Results:  Results available in CC    Medications:  Medications Administered     heparin (porcine) pf 500 Units     Admin Date  07/18/2019 Action  Given Dose  500 Units Route  IntraVENous Administered By  Markie Wolff RN          sodium chloride 0.9% injection 10 mL     Admin Date  07/18/2019 Action  Given Dose  10 mL Route  IntraVENous Administered By  Markie Wolff, MARTHA                4581 Patient tolerated treatment well. Patient discharged from [place] [address] ambulatory in no distress. Patient aware of next appointment.     Future Appointments   Date Time Provider Bola Jara   8/22/2019 10:30 AM [facility] Monico Francis   1/21/2020  8:30 AM Maximilian Hartman MD [address] [place] [address], [place] [address]

## 2019-08-22 ENCOUNTER — HOSPITAL ENCOUNTER (OUTPATIENT)
Dept: INFUSION THERAPY | Age: 74
Discharge: HOME | End: 2019-08-22

## 2019-08-26 DIAGNOSIS — I82.403: ICD-10-CM

## 2019-08-26 DIAGNOSIS — I26.99 OTHER ACUTE PULMONARY EMBOLISM WITHOUT ACUTE COR PULMONALE (HCC): ICD-10-CM

## 2019-08-26 RX ORDER — APIXABAN 5 MG/1
TABLET, FILM COATED ORAL
Qty: 180 TAB | Refills: 4 | Status: SHIPPED | OUTPATIENT
Start: 2019-08-26 | End: 2021-01-01 | Stop reason: SDUPTHER

## 2019-08-29 ENCOUNTER — HOSPITAL ENCOUNTER (OUTPATIENT)
Dept: INFUSION THERAPY | Age: 74
Discharge: HOME | End: 2019-08-29
Payer: MEDICARE

## 2019-08-29 VITALS
TEMPERATURE: 97.2 F | RESPIRATION RATE: 18 BRPM | WEIGHT: 263.7 LBS | HEART RATE: 73 BPM | DIASTOLIC BLOOD PRESSURE: 82 MMHG | BODY MASS INDEX: 36.78 KG/M2 | SYSTOLIC BLOOD PRESSURE: 156 MMHG

## 2019-08-29 DIAGNOSIS — C83.18 MANTLE CELL LYMPHOMA OF LYMPH NODES OF MULTIPLE REGIONS (HCC): Primary | ICD-10-CM

## 2019-08-29 PROCEDURE — 96523 IRRIG DRUG DELIVERY DEVICE: CPT

## 2019-08-29 PROCEDURE — 74011250636 HC RX REV CODE- 250/636: Performed by: NURSE PRACTITIONER

## 2019-08-29 RX ORDER — SODIUM CHLORIDE 0.9 % (FLUSH) 0.9 %
5-10 SYRINGE (ML) INJECTION AS NEEDED
Status: ACTIVE | OUTPATIENT
Start: 2019-08-29 | End: 2019-08-29

## 2019-08-29 RX ORDER — SODIUM CHLORIDE 9 MG/ML
10 INJECTION INTRAMUSCULAR; INTRAVENOUS; SUBCUTANEOUS AS NEEDED
Status: ACTIVE | OUTPATIENT
Start: 2019-08-29 | End: 2019-08-29

## 2019-08-29 RX ORDER — HEPARIN 100 UNIT/ML
500 SYRINGE INTRAVENOUS AS NEEDED
Status: ACTIVE | OUTPATIENT
Start: 2019-08-29 | End: 2019-08-29

## 2019-08-29 RX ADMIN — Medication 10 ML: at 10:21

## 2019-08-29 RX ADMIN — SODIUM CHLORIDE 10 ML: 9 INJECTION INTRAMUSCULAR; INTRAVENOUS; SUBCUTANEOUS at 10:21

## 2019-08-29 RX ADMIN — Medication 500 UNITS: at 10:21

## 2019-08-29 NOTE — PROGRESS NOTES
[facility] VISIT NOTE    1021  Pt arrived at [facility] ambulatory and in no distress for [facility] Assessment completed, pt had no complaints. Visit Vitals  /82   Pulse 73   Temp 97.2 °F (36.2 °C)   Resp 18   Wt 119.6 kg (263 lb 11.2 oz)   BMI 36.78 kg/m²       R chest port accessed with 0.75 in guallpa no difficulty. Positive blood return noted. Tolerated treatment well, no adverse reaction noted. Port de-accessed and flushed per protocol. Positive blood return noted. 1030  D/C'd from [facility] ambulatory and in no distress. Next appointment is 10/10/19 at 21 [phone].

## 2019-10-03 ENCOUNTER — APPOINTMENT (OUTPATIENT)
Dept: INFUSION THERAPY | Age: 74
End: 2019-10-03

## 2019-10-10 ENCOUNTER — HOSPITAL ENCOUNTER (OUTPATIENT)
Dept: INFUSION THERAPY | Age: 74
Discharge: HOME | End: 2019-10-10
Payer: MEDICARE

## 2019-10-10 VITALS
RESPIRATION RATE: 16 BRPM | DIASTOLIC BLOOD PRESSURE: 92 MMHG | SYSTOLIC BLOOD PRESSURE: 162 MMHG | OXYGEN SATURATION: 91 % | BODY MASS INDEX: 36.92 KG/M2 | HEART RATE: 76 BPM | TEMPERATURE: 96.4 F | WEIGHT: 264.7 LBS

## 2019-10-10 DIAGNOSIS — C83.18 MANTLE CELL LYMPHOMA OF LYMPH NODES OF MULTIPLE REGIONS (HCC): Primary | ICD-10-CM

## 2019-10-10 LAB
ALBUMIN SERPL-MCNC: 3.7 G/DL (ref 3.5–5)
ALBUMIN/GLOB SERPL: 1.1 {RATIO} (ref 1.1–2.2)
ALP SERPL-CCNC: 99 U/L (ref 45–117)
ALT SERPL-CCNC: 39 U/L (ref 12–78)
ANION GAP SERPL CALC-SCNC: 7 MMOL/L (ref 5–15)
AST SERPL-CCNC: 22 U/L (ref 15–37)
BASOPHILS # BLD: 0 K/UL (ref 0–0.1)
BASOPHILS NFR BLD: 0 % (ref 0–1)
BILIRUB SERPL-MCNC: 0.4 MG/DL (ref 0.2–1)
BUN SERPL-MCNC: 21 MG/DL (ref 6–20)
BUN/CREAT SERPL: 17 (ref 12–20)
CALCIUM SERPL-MCNC: 8.6 MG/DL (ref 8.5–10.1)
CHLORIDE SERPL-SCNC: 107 MMOL/L (ref 97–108)
CO2 SERPL-SCNC: 28 MMOL/L (ref 21–32)
CREAT SERPL-MCNC: 1.21 MG/DL (ref 0.7–1.3)
DIFFERENTIAL METHOD BLD: NORMAL
EOSINOPHIL # BLD: 0.1 K/UL (ref 0–0.4)
EOSINOPHIL NFR BLD: 3 % (ref 0–7)
ERYTHROCYTE [DISTWIDTH] IN BLOOD BY AUTOMATED COUNT: 12.6 % (ref 11.5–14.5)
GLOBULIN SER CALC-MCNC: 3.5 G/DL (ref 2–4)
GLUCOSE SERPL-MCNC: 133 MG/DL (ref 65–100)
HCT VFR BLD AUTO: 41.3 % (ref 36.6–50.3)
HGB BLD-MCNC: 13.3 G/DL (ref 12.1–17)
IMM GRANULOCYTES # BLD AUTO: 0 K/UL (ref 0–0.04)
IMM GRANULOCYTES NFR BLD AUTO: 0 % (ref 0–0.5)
LDH SERPL L TO P-CCNC: 188 U/L (ref 85–241)
LYMPHOCYTES # BLD: 0.9 K/UL (ref 0.8–3.5)
LYMPHOCYTES NFR BLD: 21 % (ref 12–49)
MCH RBC QN AUTO: 30.2 PG (ref 26–34)
MCHC RBC AUTO-ENTMCNC: 32.2 G/DL (ref 30–36.5)
MCV RBC AUTO: 93.9 FL (ref 80–99)
MONOCYTES # BLD: 0.5 K/UL (ref 0–1)
MONOCYTES NFR BLD: 11 % (ref 5–13)
NEUTS SEG # BLD: 2.9 K/UL (ref 1.8–8)
NEUTS SEG NFR BLD: 65 % (ref 32–75)
NRBC # BLD: 0 K/UL (ref 0–0.01)
NRBC BLD-RTO: 0 PER 100 WBC
PLATELET # BLD AUTO: 166 K/UL (ref 150–400)
PMV BLD AUTO: 9.7 FL (ref 8.9–12.9)
POTASSIUM SERPL-SCNC: 4.1 MMOL/L (ref 3.5–5.1)
PROT SERPL-MCNC: 7.2 G/DL (ref 6.4–8.2)
RBC # BLD AUTO: 4.4 M/UL (ref 4.1–5.7)
SODIUM SERPL-SCNC: 142 MMOL/L (ref 136–145)
WBC # BLD AUTO: 4.5 K/UL (ref 4.1–11.1)

## 2019-10-10 PROCEDURE — 36591 DRAW BLOOD OFF VENOUS DEVICE: CPT

## 2019-10-10 PROCEDURE — 74011250636 HC RX REV CODE- 250/636: Performed by: NURSE PRACTITIONER

## 2019-10-10 PROCEDURE — 77030012965 HC NDL HUBR BBMI -A

## 2019-10-10 PROCEDURE — 36415 COLL VENOUS BLD VENIPUNCTURE: CPT

## 2019-10-10 PROCEDURE — 83615 LACTATE (LD) (LDH) ENZYME: CPT

## 2019-10-10 PROCEDURE — 80053 COMPREHEN METABOLIC PANEL: CPT

## 2019-10-10 PROCEDURE — 85025 COMPLETE CBC W/AUTO DIFF WBC: CPT

## 2019-10-10 RX ORDER — HEPARIN 100 UNIT/ML
500 SYRINGE INTRAVENOUS AS NEEDED
Status: ACTIVE | OUTPATIENT
Start: 2019-10-10 | End: 2019-10-10

## 2019-10-10 RX ORDER — SODIUM CHLORIDE 0.9 % (FLUSH) 0.9 %
5-10 SYRINGE (ML) INJECTION AS NEEDED
Status: ACTIVE | OUTPATIENT
Start: 2019-10-10 | End: 2019-10-10

## 2019-10-10 RX ORDER — SODIUM CHLORIDE 9 MG/ML
10 INJECTION INTRAMUSCULAR; INTRAVENOUS; SUBCUTANEOUS AS NEEDED
Status: ACTIVE | OUTPATIENT
Start: 2019-10-10 | End: 2019-10-10

## 2019-10-10 RX ADMIN — Medication 10 ML: at 10:21

## 2019-10-10 RX ADMIN — HEPARIN 500 UNITS: 100 SYRINGE at 10:25

## 2019-10-10 RX ADMIN — SODIUM CHLORIDE 10 ML: 9 INJECTION INTRAMUSCULAR; INTRAVENOUS; SUBCUTANEOUS at 10:30

## 2019-10-10 NOTE — PROGRESS NOTES
[facility] Lab Visit:    Pt arrived ambulatory and in no distress, labs drawn per R port with 0.75 guallpa, CBC, BMP, and LD. Port flushed, heparinized and de-accessed. Departed [facility] ambulatory and in no distress. Visit Vitals  BP (!) 162/92   Pulse 76   Temp 96.4 °F (35.8 °C)   Resp 16   Wt 120.1 kg (264 lb 11.2 oz)   SpO2 91%   BMI 36.92 kg/m²       Labs available in CC once resulted.

## 2019-11-14 ENCOUNTER — APPOINTMENT (OUTPATIENT)
Dept: INFUSION THERAPY | Age: 74
End: 2019-11-14

## 2019-11-21 ENCOUNTER — HOSPITAL ENCOUNTER (OUTPATIENT)
Dept: INFUSION THERAPY | Age: 74
Discharge: HOME | End: 2019-11-21
Payer: MEDICARE

## 2019-11-21 VITALS
RESPIRATION RATE: 18 BRPM | HEIGHT: 71 IN | WEIGHT: 262.6 LBS | HEART RATE: 77 BPM | BODY MASS INDEX: 36.76 KG/M2 | OXYGEN SATURATION: 92 % | SYSTOLIC BLOOD PRESSURE: 152 MMHG | DIASTOLIC BLOOD PRESSURE: 91 MMHG | TEMPERATURE: 98 F

## 2019-11-21 DIAGNOSIS — C83.18 MANTLE CELL LYMPHOMA OF LYMPH NODES OF MULTIPLE REGIONS (HCC): Primary | ICD-10-CM

## 2019-11-21 PROCEDURE — 96523 IRRIG DRUG DELIVERY DEVICE: CPT

## 2019-11-21 PROCEDURE — 77030012965 HC NDL HUBR BBMI -A

## 2019-11-21 PROCEDURE — 74011000250 HC RX REV CODE- 250: Performed by: NURSE PRACTITIONER

## 2019-11-21 PROCEDURE — 74011250636 HC RX REV CODE- 250/636: Performed by: NURSE PRACTITIONER

## 2019-11-21 RX ORDER — SODIUM CHLORIDE 0.9 % (FLUSH) 0.9 %
5-10 SYRINGE (ML) INJECTION AS NEEDED
Status: ACTIVE | OUTPATIENT
Start: 2019-11-21 | End: 2019-11-21

## 2019-11-21 RX ORDER — HEPARIN 100 UNIT/ML
500 SYRINGE INTRAVENOUS AS NEEDED
Status: ACTIVE | OUTPATIENT
Start: 2019-11-21 | End: 2019-11-21

## 2019-11-21 RX ORDER — SODIUM CHLORIDE 9 MG/ML
10 INJECTION INTRAMUSCULAR; INTRAVENOUS; SUBCUTANEOUS AS NEEDED
Status: ACTIVE | OUTPATIENT
Start: 2019-11-21 | End: 2019-11-21

## 2019-11-21 RX ADMIN — SODIUM CHLORIDE 10 ML: 9 INJECTION, SOLUTION INTRAMUSCULAR; INTRAVENOUS; SUBCUTANEOUS at 10:25

## 2019-11-21 RX ADMIN — Medication 500 UNITS: at 10:30

## 2019-11-21 RX ADMIN — SODIUM CHLORIDE 10 ML: 9 INJECTION, SOLUTION INTRAMUSCULAR; INTRAVENOUS; SUBCUTANEOUS at 10:30

## 2019-11-21 NOTE — PROGRESS NOTES
[facility] Progress Note    Date: 2019    Name: Liss Nguyen    MRN: [id number]         : 1945    1020. Mr. Delford Kussmaul Arrived ambulatory and in no distress for [facility]. R chest wall port accessed without difficulty by HUNG Phillips RN. Patient Vitals for the past 12 hrs:   Temp Pulse Resp BP SpO2   19 1025 98 °F (36.7 °C) 77 18 (!) 152/91 92 %         1030. Mr. Delford Kussmaul tolerated treatment well and was discharged from [place] [address] in stable condition. Port de-accessed, flushed & heparinized per protocol. He is to return on 19 for his next appointment.     Zena Hooker RN  2019

## 2019-12-26 ENCOUNTER — APPOINTMENT (OUTPATIENT)
Dept: INFUSION THERAPY | Age: 74
End: 2019-12-26

## 2020-01-02 ENCOUNTER — HOSPITAL ENCOUNTER (OUTPATIENT)
Dept: INFUSION THERAPY | Age: 75
Discharge: HOME | End: 2020-01-02
Payer: MEDICARE

## 2020-01-02 VITALS
DIASTOLIC BLOOD PRESSURE: 82 MMHG | HEART RATE: 84 BPM | TEMPERATURE: 96.2 F | RESPIRATION RATE: 18 BRPM | SYSTOLIC BLOOD PRESSURE: 150 MMHG

## 2020-01-02 DIAGNOSIS — C83.18 MANTLE CELL LYMPHOMA OF LYMPH NODES OF MULTIPLE REGIONS (HCC): Primary | ICD-10-CM

## 2020-01-02 PROCEDURE — 74011250636 HC RX REV CODE- 250/636: Performed by: NURSE PRACTITIONER

## 2020-01-02 PROCEDURE — 77030012965 HC NDL HUBR BBMI -A

## 2020-01-02 PROCEDURE — 96523 IRRIG DRUG DELIVERY DEVICE: CPT

## 2020-01-02 RX ORDER — SODIUM CHLORIDE 9 MG/ML
10 INJECTION INTRAMUSCULAR; INTRAVENOUS; SUBCUTANEOUS AS NEEDED
Status: ACTIVE | OUTPATIENT
Start: 2020-01-02 | End: 2020-01-02

## 2020-01-02 RX ORDER — HEPARIN 100 UNIT/ML
500 SYRINGE INTRAVENOUS AS NEEDED
Status: ACTIVE | OUTPATIENT
Start: 2020-01-02 | End: 2020-01-02

## 2020-01-02 RX ORDER — SODIUM CHLORIDE 0.9 % (FLUSH) 0.9 %
5-10 SYRINGE (ML) INJECTION AS NEEDED
Status: ACTIVE | OUTPATIENT
Start: 2020-01-02 | End: 2020-01-02

## 2020-01-02 RX ADMIN — Medication 500 UNITS: at 10:43

## 2020-01-02 RX ADMIN — Medication 10 ML: at 10:42

## 2020-01-02 NOTE — PROGRESS NOTES
[facility]patient [facility]fusion [facility] Short Visit Progress Note    Patient admitted to [facility] for PF ambulatory in stable condition. Vital Signs:  Visit Vitals  /82 (BP 1 Location: Right arm, BP Patient Position: Sitting)   Pulse 84   Temp 96.2 °F (35.7 °C)   Resp 18     R chest port with positive blood return. Medications:  Medications Administered     heparin (porcine) pf 500 Units     Admin Date  01/02/2020 Action  Given Dose  500 Units Route  IntraVENous Administered By  Verdia Arthur          sodium chloride (NS) flush 5-10 mL     Admin Date  01/02/2020 Action  Given Dose  10 mL Route  IntraVENous Administered By  Verdia Arthur                Patient tolerated treatment well. Patient discharged from [place] [address] ambulatory in no distress. Patient aware of next appointment.     Kiera Tavarez RN    Future Appointments   Date Time Provider Bola Jara   1/21/2020  8:30 AM Araceli Hartman MD [address] [place] [address], [place] [address]

## 2020-01-20 NOTE — PROGRESS NOTES
[facility] [place] at [facility]  [address] [facility], [address]  W: [phone]  F: [phone]      Reason for Visit:   Kim Foster is a 66 y.o. male who is seen for follow up of lymphoma. Treatment History:   · CTA Chest 11/11/2016: PE in left lower lobe pulmonary arteries, extensive adneopathy  · CT A/P 11/12/2016: Cirrhosis, massive splenomegaly, ascites, massive lymphadenopathy  · US guided axillary node biopsy 11/14/2016: CD5+ B-cell non-hodgkin lymphoma, phenotype most consistent with mantle cell lymphoma. FISH with t(11;14)  · PET-CT 11/23/2016: Marked splenomegaly with increased metabolic activity consistent with lymphoma. Bilateral cervical and axillary adenopathy. Mediastinal and right hilar and left diaphragmatic adenopathy. Bilateral pelvic and inguinal adenopathy. Multiple small mesenteric and retroperitoneal nodes with low grade or no activity. · BMBx 11/29/2016: Hypercellular marrow involved by mantle cell lymphoma  · Stage IV Mantle Cell Lymphoma  · Palliative chemotherapy with Rituximab and Bendamustine x6 cycles from 12/8/2016 to 5/12/17  · Bone marrow biopsy 5/23/2017: no evidence of lymphoma  · PET/CT 6/7/2017: There has been marked improvement in the adenopathy in the cervical region, axilla, mediastinum, abdomen and pelvis which now demonstrate no increased metabolic activity. There has been marked decrease in the spleen which now demonstrates no increased metabolic activity. History of Present Illness:   He reports no change, no new issues. Arthritis is chronic and unchanged. No recent infections. Had flu vaccine. No fevers, chills, night sweats, unintentional weight loss, adenopathy. Bowels moving regularly. Energy is good, remains pretty active. He is unaccompanied today. PAST HISTORY: The following sections were reviewed and updated in the EMR as appropriate: PMH, SH, FH, Medications, Allergies.     No Known Allergies Review of Systems: A complete review of systems was obtained, reviewed, and scanned into the EMR. Pertinent findings reviewed above. Physical Exam:     Visit Vitals  BP (!) 167/92 (BP 1 Location: Right arm, BP Patient Position: Sitting)   Pulse 68   Temp 97.5 °F (36.4 °C) (Temporal)   Resp 20   Ht 5' 11\" (1.803 m)   Wt 263 lb (119.3 kg)   SpO2 93%   BMI 36.68 kg/m²     ECOG PS: 0  General: No distress  Eyes: PERRLA, anicteric sclerae  HENT: Atraumatic, OP clear  Neck: Supple  Lymphatic: No cervical, supraclavicular, or inguinal adenopathy  Respiratory: CTAB, normal respiratory effort  CV: Normal rate, regular rhythm, no murmurs. No edema noted. GI: Soft, nontender, nondistended, no masses, no hepatomegaly, no splenomegaly  MS: Normal gait and station. Digits without clubbing or cyanosis. Skin: No rashes, ecchymoses, or petechiae. Normal temperature, turgor, and texture. Psych: Alert, oriented, appropriate affect, normal judgment/insight    Results:     Lab Results   Component Value Date/Time    WBC 4.5 10/10/2019 10:27 AM    HGB 13.3 10/10/2019 10:27 AM    HCT 41.3 10/10/2019 10:27 AM    PLATELET 690 78/40/7423 10:27 AM    MCV 93.9 10/10/2019 10:27 AM    ABS.  NEUTROPHILS 2.9 10/10/2019 10:27 AM    Hemoglobin (POC) 10.9 (L) 01/12/2017 08:02 AM    Hematocrit (POC) 32 (L) 01/12/2017 08:02 AM     Lab Results   Component Value Date/Time    Sodium 142 10/10/2019 10:27 AM    Potassium 4.1 10/10/2019 10:27 AM    Chloride 107 10/10/2019 10:27 AM    CO2 28 10/10/2019 10:27 AM    Glucose 133 (H) 10/10/2019 10:27 AM    BUN 21 (H) 10/10/2019 10:27 AM    Creatinine 1.21 10/10/2019 10:27 AM    GFR est AA >60 10/10/2019 10:27 AM    GFR est non-AA 58 (L) 10/10/2019 10:27 AM    Calcium 8.6 10/10/2019 10:27 AM    Sodium (POC) 143 01/12/2017 08:02 AM    Potassium (POC) 4.0 01/12/2017 08:02 AM    Chloride (POC) 104 01/12/2017 08:02 AM    Glucose (POC) 156 (H) 01/12/2017 08:02 AM    BUN (POC) 23 (H) 01/12/2017 08:02 AM Creatinine (POC) 1.0 07/11/2019 07:47 AM    Calcium, ionized (POC) 1.19 01/12/2017 08:02 AM     Lab Results   Component Value Date/Time    Bilirubin, total 0.4 10/10/2019 10:27 AM    ALT (SGPT) 39 10/10/2019 10:27 AM    AST (SGOT) 22 10/10/2019 10:27 AM    Alk. phosphatase 99 10/10/2019 10:27 AM    Protein, total 7.2 10/10/2019 10:27 AM    Albumin 3.7 10/10/2019 10:27 AM    Globulin 3.5 10/10/2019 10:27 AM     Lab Results   Component Value Date/Time    Reticulocyte count 1.8 11/10/2016 02:00 PM    Iron % saturation 11 (L) 11/10/2016 02:00 PM    TIBC 240 (L) 11/10/2016 02:00 PM    Ferritin 100 11/10/2016 02:00 PM    Vitamin B12 415 11/10/2016 02:00 PM    Folate 15.6 11/10/2016 02:00 PM    Haptoglobin 186 11/11/2016 12:04 PM     10/10/2019 10:27 AM    Beta-2 Microglobulin, serum 4.4 (H) 11/15/2016 05:21 AM    TSH 4.06 (H) 11/10/2016 11:21 AM    Hep C  virus Ab Interp. NONREACTIVE 06/14/2018 10:09 AM     Lab Results   Component Value Date/Time    INR 1.0 06/14/2018 10:09 AM    aPTT 31.6 06/14/2018 10:09 AM    Fibrinogen 215 11/11/2016 12:04 PM       HepB/C negative  TTE 11/10/2016: EF 60%    Bone marrow biopsy 11/29/2016: Hypercellular marrow involved by mantle cell lymphoma    Bone marrow biopsy 5/23/17: no evidence of lymphoma    US abdomen 5/29/2018:  1. Increased hepatic echogenicity compatible with diffuse hepatocellular  process, most commonly seen in steatosis. 2. Areas of compromised evaluation due to extensive bowel gas as above with  nonvisualization of gallbladder and common bile duct. CT C/A/P 8/6/2018: No lymphadenopathy in the chest, abdomen or pelvis. CT C/A/P 1/18/2019: No lymphadenopathy in the chest, abdomen, or pelvis. No significant change. No acute process. CT C/A/P 7/11/2019:  No evidence of lymphadenopathy. Pleural-based nodule left upper lobe unchanged.  Cholelithiasis    Assessment:   1) Mantle cell lymphoma  Stage IV  His MIPI score was 6.24, which is High Risk and cooresponds to a median survival of 37 months and 5-year OS of 20%. He has completed chemotherapy with R-Bendamustine for 6 cycles, achieving a complete response. He decided against consolidative transplant or maintenance rituximab, and he is currently being followed with surveillance. He has no evidence of disease recurrence at this time. We will continue surveillance. Monitoring with H&P every 6 months and CT yearly. 2) DVT, PE  Diagnosed 11/11/2016. Malignancy associated. Previously on lovenox. Currently, on apixaban BID. I have recommended long-term anticoagulation. However, if his malignancy does not recur, we could consider stopping this in the future. Tolerating well. 3) Thrombocytopenia  Intermittent, mild, resolved on last check. Secondary to splenomegaly/cirrhosis. Monitor. 4) HTN  High again today. He believes it is white coat HTN. I recommended he follow up with his PCP. 5) Port care  Continue port flushes every 6 weeks. Consider removing if next imaging is without recurrent disease. For now he prefers to keep it in place.      Plan:     · Continue apixiban 5mg PO BID  · Port flush every 6 weeks  · Labs every 12 weeks: CBC, CMP, LD  · CT C/A/P every 12 months, due before next visit  · Return to clinic in 6 months      Signed By: Nic Wright MD

## 2020-01-21 ENCOUNTER — OFFICE VISIT (OUTPATIENT)
Dept: ONCOLOGY | Age: 75
End: 2020-01-21

## 2020-01-21 VITALS
DIASTOLIC BLOOD PRESSURE: 92 MMHG | SYSTOLIC BLOOD PRESSURE: 167 MMHG | TEMPERATURE: 97.5 F | BODY MASS INDEX: 36.82 KG/M2 | HEIGHT: 71 IN | RESPIRATION RATE: 20 BRPM | WEIGHT: 263 LBS | HEART RATE: 68 BPM | OXYGEN SATURATION: 93 %

## 2020-01-21 DIAGNOSIS — C83.18 MANTLE CELL LYMPHOMA OF LYMPH NODES OF MULTIPLE REGIONS (HCC): Primary | ICD-10-CM

## 2020-01-21 NOTE — PROGRESS NOTES
Ban Rod is a 66 y.o. male follow up for lymphoma. 1. Have you been to the ER, urgent care clinic since your last visit? Hospitalized since your last visit?no     2. Have you seen or consulted any other health care providers outside of the [address] [place] [address] since your last visit? Include any pap smears or colon screening.  no

## 2020-02-11 ENCOUNTER — HOSPITAL ENCOUNTER (OUTPATIENT)
Dept: INFUSION THERAPY | Age: 75
Discharge: HOME | End: 2020-02-11
Payer: MEDICARE

## 2020-02-11 VITALS
DIASTOLIC BLOOD PRESSURE: 91 MMHG | TEMPERATURE: 96.2 F | HEART RATE: 87 BPM | OXYGEN SATURATION: 93 % | SYSTOLIC BLOOD PRESSURE: 154 MMHG | RESPIRATION RATE: 18 BRPM

## 2020-02-11 DIAGNOSIS — C83.18 MANTLE CELL LYMPHOMA OF LYMPH NODES OF MULTIPLE REGIONS (HCC): ICD-10-CM

## 2020-02-11 PROCEDURE — 96523 IRRIG DRUG DELIVERY DEVICE: CPT

## 2020-02-11 PROCEDURE — 77030012965 HC NDL HUBR BBMI -A

## 2020-02-11 PROCEDURE — 74011250636 HC RX REV CODE- 250/636: Performed by: INTERNAL MEDICINE

## 2020-02-11 PROCEDURE — 74011000250 HC RX REV CODE- 250: Performed by: INTERNAL MEDICINE

## 2020-02-11 RX ORDER — SODIUM CHLORIDE 9 MG/ML
10 INJECTION INTRAMUSCULAR; INTRAVENOUS; SUBCUTANEOUS AS NEEDED
Status: DISCONTINUED | OUTPATIENT
Start: 2020-02-11 | End: 2020-02-12 | Stop reason: HOSPADM

## 2020-02-11 RX ORDER — HEPARIN 100 UNIT/ML
500 SYRINGE INTRAVENOUS AS NEEDED
Status: DISCONTINUED | OUTPATIENT
Start: 2020-02-11 | End: 2020-02-12 | Stop reason: HOSPADM

## 2020-02-11 RX ORDER — SODIUM CHLORIDE 0.9 % (FLUSH) 0.9 %
5-10 SYRINGE (ML) INJECTION AS NEEDED
Status: DISCONTINUED | OUTPATIENT
Start: 2020-02-11 | End: 2020-02-12 | Stop reason: HOSPADM

## 2020-02-11 RX ADMIN — SODIUM CHLORIDE 10 ML: 9 INJECTION, SOLUTION INTRAMUSCULAR; INTRAVENOUS; SUBCUTANEOUS at 09:06

## 2020-02-11 RX ADMIN — Medication 500 UNITS: at 09:06

## 2020-02-11 NOTE — PROGRESS NOTES
[facility] VISIT NOTE    [id number]. Pt arrived at [facility] ambulatory and in no distress for [facility]. Assessment completed, pt with no acute concerns or complaints. RCW chest port accessed with . 75 in guallpa with no difficulty. Positive blood return noted and flushes easily. Port de-accessed and flushed per protocol. Positive blood return noted. Patient Vitals for the past 12 hrs:   Temp Pulse Resp BP SpO2   02/11/20 0858 96.2 °F (35.7 °C) 87 18 (!) 154/91 93 %     0910. D/C'd from [facility] ambulatory and in no distress. Karla Sanchez  Next appointment is 3/24/20 at 9:00 am.

## 2020-03-24 ENCOUNTER — HOSPITAL ENCOUNTER (OUTPATIENT)
Dept: INFUSION THERAPY | Age: 75
Discharge: HOME | End: 2020-03-24
Payer: MEDICARE

## 2020-03-24 VITALS
RESPIRATION RATE: 18 BRPM | OXYGEN SATURATION: 93 % | DIASTOLIC BLOOD PRESSURE: 85 MMHG | HEART RATE: 60 BPM | SYSTOLIC BLOOD PRESSURE: 147 MMHG | TEMPERATURE: 97.6 F

## 2020-03-24 DIAGNOSIS — C83.18 MANTLE CELL LYMPHOMA OF LYMPH NODES OF MULTIPLE REGIONS (HCC): Primary | ICD-10-CM

## 2020-03-24 LAB
ALBUMIN SERPL-MCNC: 3.6 G/DL (ref 3.5–5)
ALBUMIN/GLOB SERPL: 1.1 {RATIO} (ref 1.1–2.2)
ALP SERPL-CCNC: 92 U/L (ref 45–117)
ALT SERPL-CCNC: 39 U/L (ref 12–78)
ANION GAP SERPL CALC-SCNC: 5 MMOL/L (ref 5–15)
AST SERPL-CCNC: 20 U/L (ref 15–37)
BASO+EOS+MONOS # BLD AUTO: 0.9 K/UL (ref 0.2–1.2)
BASO+EOS+MONOS NFR BLD AUTO: 16 % (ref 3.2–16.9)
BILIRUB SERPL-MCNC: 0.7 MG/DL (ref 0.2–1)
BUN SERPL-MCNC: 19 MG/DL (ref 6–20)
BUN/CREAT SERPL: 17 (ref 12–20)
CALCIUM SERPL-MCNC: 8.9 MG/DL (ref 8.5–10.1)
CHLORIDE SERPL-SCNC: 104 MMOL/L (ref 97–108)
CO2 SERPL-SCNC: 30 MMOL/L (ref 21–32)
CREAT SERPL-MCNC: 1.09 MG/DL (ref 0.7–1.3)
DIFFERENTIAL METHOD BLD: NORMAL
ERYTHROCYTE [DISTWIDTH] IN BLOOD BY AUTOMATED COUNT: 13.1 % (ref 11.8–15.8)
GLOBULIN SER CALC-MCNC: 3.4 G/DL (ref 2–4)
GLUCOSE SERPL-MCNC: 104 MG/DL (ref 65–100)
HCT VFR BLD AUTO: 41.5 % (ref 36.6–50.3)
HGB BLD-MCNC: 13.6 G/DL (ref 12.1–17)
LYMPHOCYTES # BLD: 1.1 K/UL (ref 0.8–3.5)
LYMPHOCYTES NFR BLD: 21 % (ref 12–49)
MCH RBC QN AUTO: 30 PG (ref 26–34)
MCHC RBC AUTO-ENTMCNC: 32.8 G/DL (ref 30–36.5)
MCV RBC AUTO: 91.4 FL (ref 80–99)
NEUTS SEG # BLD: 3.5 K/UL (ref 1.8–8)
NEUTS SEG NFR BLD: 63 % (ref 32–75)
PLATELET # BLD AUTO: 175 K/UL (ref 150–400)
POTASSIUM SERPL-SCNC: 4.3 MMOL/L (ref 3.5–5.1)
PROT SERPL-MCNC: 7 G/DL (ref 6.4–8.2)
RBC # BLD AUTO: 4.54 M/UL (ref 4.1–5.7)
SODIUM SERPL-SCNC: 139 MMOL/L (ref 136–145)
WBC # BLD AUTO: 5.5 K/UL (ref 4.1–11.1)

## 2020-03-24 PROCEDURE — 36591 DRAW BLOOD OFF VENOUS DEVICE: CPT

## 2020-03-24 PROCEDURE — 80053 COMPREHEN METABOLIC PANEL: CPT

## 2020-03-24 PROCEDURE — 77030012965 HC NDL HUBR BBMI -A

## 2020-03-24 PROCEDURE — 85025 COMPLETE CBC W/AUTO DIFF WBC: CPT

## 2020-03-24 PROCEDURE — 36415 COLL VENOUS BLD VENIPUNCTURE: CPT

## 2020-03-24 PROCEDURE — 74011250636 HC RX REV CODE- 250/636: Performed by: NURSE PRACTITIONER

## 2020-03-24 RX ORDER — SODIUM CHLORIDE 0.9 % (FLUSH) 0.9 %
5-10 SYRINGE (ML) INJECTION AS NEEDED
Status: DISPENSED | OUTPATIENT
Start: 2020-03-24 | End: 2020-03-24

## 2020-03-24 RX ORDER — SODIUM CHLORIDE 9 MG/ML
10 INJECTION INTRAMUSCULAR; INTRAVENOUS; SUBCUTANEOUS AS NEEDED
Status: ACTIVE | OUTPATIENT
Start: 2020-03-24 | End: 2020-03-24

## 2020-03-24 RX ORDER — HEPARIN 100 UNIT/ML
500 SYRINGE INTRAVENOUS AS NEEDED
Status: ACTIVE | OUTPATIENT
Start: 2020-03-24 | End: 2020-03-24

## 2020-03-24 RX ADMIN — Medication 10 ML: at 08:56

## 2020-03-24 RX ADMIN — SODIUM CHLORIDE 10 ML: 9 INJECTION INTRAMUSCULAR; INTRAVENOUS; SUBCUTANEOUS at 08:55

## 2020-03-24 RX ADMIN — HEPARIN 500 UNITS: 100 SYRINGE at 08:57

## 2020-03-24 NOTE — PROGRESS NOTES
[facility]fusion [facility] Short Visit Progress Note    0840 Patient admitted to [facility] for Port flush and labs ambulatory in stable condition. Assessment completed. No new concerns voiced. Port accessed with positive blood return. Labs drawn and sent for processing. Results pending in CC. Vital Signs:  Visit Vitals  /85 (BP 1 Location: Right arm, BP Patient Position: Sitting)   Pulse 60   Temp 97.6 °F (36.4 °C)   Resp 18   SpO2 93%     Medications:  Medications Administered     0.9% sodium chloride injection 10 mL     Admin Date  03/24/2020 Action  Given Dose  10 mL Route  IntraVENous Administered By  Sharla Salinas RN          heparin (porcine) pf 500 Units     Admin Date  03/24/2020 Action  Given Dose  500 Units Route  IntraVENous Administered By  Sharla Salinas RN          sodium chloride (NS) flush 5-10 mL     Admin Date  03/24/2020 Action  Given Dose  10 mL Route  IntraVENous Administered By  Sharla Salinas RN              0900 Patient tolerated treatment well. Port flushed with positive blood return, heparinized, and guallpa needle de-accessed per protocol. Patient discharged from [place] [address] ambulatory in no distress at 0900. Patient aware of next appointment.     Future Appointments   Date Time Provider Bola Jara   5/5/2020  9:00 AM [facility]   6/16/2020  9:00 AM SS INF7 CH3 <1H [facility]   7/21/2020  9:00 AM SS INF7 CH2 <1H [facility]   7/21/2020  9:30 AM Jayne Hartman MD [address] [place] [address], [place] [address]

## 2020-05-05 ENCOUNTER — HOSPITAL ENCOUNTER (OUTPATIENT)
Dept: INFUSION THERAPY | Age: 75
Discharge: HOME | End: 2020-05-05
Payer: MEDICARE

## 2020-05-05 VITALS
HEART RATE: 78 BPM | DIASTOLIC BLOOD PRESSURE: 95 MMHG | OXYGEN SATURATION: 95 % | RESPIRATION RATE: 18 BRPM | TEMPERATURE: 97.6 F | SYSTOLIC BLOOD PRESSURE: 165 MMHG

## 2020-05-05 DIAGNOSIS — C83.18 MANTLE CELL LYMPHOMA OF LYMPH NODES OF MULTIPLE REGIONS (HCC): Primary | ICD-10-CM

## 2020-05-05 PROCEDURE — 74011250636 HC RX REV CODE- 250/636: Performed by: NURSE PRACTITIONER

## 2020-05-05 PROCEDURE — 77030012965 HC NDL HUBR BBMI -A

## 2020-05-05 PROCEDURE — 74011000250 HC RX REV CODE- 250: Performed by: NURSE PRACTITIONER

## 2020-05-05 PROCEDURE — 96523 IRRIG DRUG DELIVERY DEVICE: CPT

## 2020-05-05 RX ORDER — HEPARIN 100 UNIT/ML
500 SYRINGE INTRAVENOUS AS NEEDED
Status: ACTIVE | OUTPATIENT
Start: 2020-05-05 | End: 2020-05-05

## 2020-05-05 RX ORDER — SODIUM CHLORIDE 9 MG/ML
10 INJECTION INTRAMUSCULAR; INTRAVENOUS; SUBCUTANEOUS AS NEEDED
Status: ACTIVE | OUTPATIENT
Start: 2020-05-05 | End: 2020-05-05

## 2020-05-05 RX ORDER — SODIUM CHLORIDE 0.9 % (FLUSH) 0.9 %
5-10 SYRINGE (ML) INJECTION AS NEEDED
Status: DISPENSED | OUTPATIENT
Start: 2020-05-05 | End: 2020-05-05

## 2020-05-05 RX ADMIN — SODIUM CHLORIDE 10 ML: 9 INJECTION, SOLUTION INTRAMUSCULAR; INTRAVENOUS; SUBCUTANEOUS at 08:57

## 2020-05-05 RX ADMIN — HEPARIN 500 UNITS: 100 SYRINGE at 08:57

## 2020-05-05 NOTE — PROGRESS NOTES
[facility] VISIT NOTE    0845. Pt arrived at [facility] ambulatory and in no distress for [facility]. Assessment completed, pt with no acute complaints or concerns voiced. BP elevated, pt stated he will be seeing PCP to address this soon. RCW chest port accessed with 1 in guallpa with no difficulty. Positive blood return noted and flushes easily. Port de-accessed and flushed per protocol. Positive blood return noted. Patient Vitals for the past 12 hrs:   Temp Pulse Resp BP SpO2   05/05/20 0848 97.6 °F (36.4 °C) 78 18 (!) 165/95 95 %     0900. D/C'd from [facility] ambulatory and in no distress.  Next appointment is 6/16/20 at 9:00 am.

## 2020-06-12 RX ORDER — SODIUM CHLORIDE 9 MG/ML
10 INJECTION INTRAMUSCULAR; INTRAVENOUS; SUBCUTANEOUS AS NEEDED
Status: CANCELLED | OUTPATIENT
Start: 2020-10-13

## 2020-06-12 RX ORDER — SODIUM CHLORIDE 0.9 % (FLUSH) 0.9 %
5-10 SYRINGE (ML) INJECTION AS NEEDED
Status: CANCELLED | OUTPATIENT
Start: 2020-07-21

## 2020-06-12 RX ORDER — SODIUM CHLORIDE 9 MG/ML
10 INJECTION INTRAMUSCULAR; INTRAVENOUS; SUBCUTANEOUS AS NEEDED
Status: CANCELLED | OUTPATIENT
Start: 2020-07-21

## 2020-06-12 RX ORDER — HEPARIN 100 UNIT/ML
500 SYRINGE INTRAVENOUS AS NEEDED
Status: CANCELLED | OUTPATIENT
Start: 2020-10-13

## 2020-06-12 RX ORDER — SODIUM CHLORIDE 9 MG/ML
10 INJECTION INTRAMUSCULAR; INTRAVENOUS; SUBCUTANEOUS AS NEEDED
Status: CANCELLED | OUTPATIENT
Start: 2020-11-24

## 2020-06-12 RX ORDER — HEPARIN 100 UNIT/ML
500 SYRINGE INTRAVENOUS AS NEEDED
Status: CANCELLED | OUTPATIENT
Start: 2020-07-21

## 2020-06-12 RX ORDER — SODIUM CHLORIDE 0.9 % (FLUSH) 0.9 %
5-10 SYRINGE (ML) INJECTION AS NEEDED
Status: CANCELLED | OUTPATIENT
Start: 2020-11-24

## 2020-06-12 RX ORDER — SODIUM CHLORIDE 0.9 % (FLUSH) 0.9 %
5-10 SYRINGE (ML) INJECTION AS NEEDED
Status: CANCELLED | OUTPATIENT
Start: 2020-10-13

## 2020-06-12 RX ORDER — HEPARIN 100 UNIT/ML
500 SYRINGE INTRAVENOUS AS NEEDED
Status: CANCELLED | OUTPATIENT
Start: 2020-11-24

## 2020-06-16 ENCOUNTER — APPOINTMENT (OUTPATIENT)
Dept: INFUSION THERAPY | Age: 75
End: 2020-06-16

## 2020-07-09 ENCOUNTER — HOSPITAL ENCOUNTER (OUTPATIENT)
Dept: CT IMAGING | Age: 75
Discharge: HOME | End: 2020-07-09
Attending: INTERNAL MEDICINE
Payer: MEDICARE

## 2020-07-09 DIAGNOSIS — C83.18 MANTLE CELL LYMPHOMA OF LYMPH NODES OF MULTIPLE REGIONS (HCC): ICD-10-CM

## 2020-07-09 LAB — CREAT BLD-MCNC: 1 MG/DL (ref 0.6–1.3)

## 2020-07-09 PROCEDURE — 74177 CT ABD & PELVIS W/CONTRAST: CPT

## 2020-07-09 PROCEDURE — 82565 ASSAY OF CREATININE: CPT

## 2020-07-09 PROCEDURE — 74011636320 HC RX REV CODE- 636/320: Performed by: RADIOLOGY

## 2020-07-09 RX ORDER — HEPARIN 100 UNIT/ML
500 SYRINGE INTRAVENOUS
Status: DISPENSED | OUTPATIENT
Start: 2020-07-09 | End: 2020-07-09

## 2020-07-09 RX ADMIN — IOPAMIDOL 100 ML: 755 INJECTION, SOLUTION INTRAVENOUS at 09:39

## 2020-07-13 NOTE — PROGRESS NOTES
[facility] [place] at [address], [address] [facility]  W: [phone]  F: [phone]      Reason for Visit:   Rosa Son is a 66 y.o. male who is seen for follow up of lymphoma. Treatment History:   · CTA Chest 11/11/2016: PE in left lower lobe pulmonary arteries, extensive adneopathy  · CT A/P 11/12/2016: Cirrhosis, massive splenomegaly, ascites, massive lymphadenopathy  · US guided axillary node biopsy 11/14/2016: CD5+ B-cell non-hodgkin lymphoma, phenotype most consistent with mantle cell lymphoma. FISH with t(11;14)  · PET-CT 11/23/2016: Marked splenomegaly with increased metabolic activity consistent with lymphoma. Bilateral cervical and axillary adenopathy. Mediastinal and right hilar and left diaphragmatic adenopathy. Bilateral pelvic and inguinal adenopathy. Multiple small mesenteric and retroperitoneal nodes with low grade or no activity. · BMBx 11/29/2016: Hypercellular marrow involved by mantle cell lymphoma  · Stage IV Mantle Cell Lymphoma  · Palliative chemotherapy with Rituximab and Bendamustine x6 cycles from 12/8/2016 to 5/12/17  · Bone marrow biopsy 5/23/2017: no evidence of lymphoma  · PET/CT 6/7/2017: There has been marked improvement in the adenopathy in the cervical region, axilla, mediastinum, abdomen and pelvis which now demonstrate no increased metabolic activity. There has been marked decrease in the spleen which now demonstrates no increased metabolic activity. · CT C/A/P 7/9/2020: New enlarged bilateral axillary lymph nodes, left greater than right, and new enlarged left iliac chain and femoral lymph nodes, highly suspicious for lymphoma recurrence. History of Present Illness:   He reports feeling well. Keeping isolated with pandemic. Walks in the morning. Good energy. Arthritis in knees bothers him, but stable. No fevers, chills, night sweats, unintentional weight loss, adenopathy. No recent infections.       He is unaccompanied today. PAST HISTORY: The following sections were reviewed and updated in the EMR as appropriate: PMH, SH, FH, Medications, Allergies. No Known Allergies     Review of Systems: A complete review of systems was obtained, reviewed, and scanned into the EMR. Pertinent findings reviewed above. Physical Exam:     Visit Vitals  BP (!) 159/93 (BP 1 Location: Right arm, BP Patient Position: Sitting)   Pulse 71   Temp 97.7 °F (36.5 °C) (Temporal)   Resp 20   Ht 5' 11\" (1.803 m)   Wt 263 lb (119.3 kg)   SpO2 98%   BMI 36.68 kg/m²     ECOG PS: 0  General: No distress  Eyes: PERRLA, anicteric sclerae  HENT: Atraumatic, OP clear  Neck: Supple  Lymphatic: No cervical, supraclavicular, or inguinal adenopathy  Respiratory: CTAB, normal respiratory effort  CV: Normal rate, regular rhythm, no murmurs. No edema noted. GI: Soft, nontender, nondistended, no masses, no hepatomegaly, no splenomegaly  MS: Normal gait and station. Digits without clubbing or cyanosis. Skin: No rashes, ecchymoses, or petechiae. Normal temperature, turgor, and texture. Psych: Alert, oriented, appropriate affect, normal judgment/insight    Results:     Lab Results   Component Value Date/Time    WBC 5.0 07/21/2020 08:51 AM    HGB 14.0 07/21/2020 08:51 AM    HCT 41.2 07/21/2020 08:51 AM    PLATELET 207 01/52/1980 08:51 AM    MCV 92.4 07/21/2020 08:51 AM    ABS.  NEUTROPHILS 2.9 07/21/2020 08:51 AM    Hemoglobin (POC) 10.9 (L) 01/12/2017 08:02 AM    Hematocrit (POC) 32 (L) 01/12/2017 08:02 AM     Lab Results   Component Value Date/Time    Sodium 139 03/24/2020 08:56 AM    Potassium 4.3 03/24/2020 08:56 AM    Chloride 104 03/24/2020 08:56 AM    CO2 30 03/24/2020 08:56 AM    Glucose 104 (H) 03/24/2020 08:56 AM    BUN 19 03/24/2020 08:56 AM    Creatinine 1.09 03/24/2020 08:56 AM    GFR est AA >60 03/24/2020 08:56 AM    GFR est non-AA >60 03/24/2020 08:56 AM    Calcium 8.9 03/24/2020 08:56 AM    Sodium (POC) 143 01/12/2017 08:02 AM Potassium (POC) 4.0 01/12/2017 08:02 AM    Chloride (POC) 104 01/12/2017 08:02 AM    Glucose (POC) 156 (H) 01/12/2017 08:02 AM    BUN (POC) 23 (H) 01/12/2017 08:02 AM    Creatinine (POC) 1.0 07/09/2020 09:37 AM    Calcium, ionized (POC) 1.19 01/12/2017 08:02 AM     Lab Results   Component Value Date/Time    Bilirubin, total 0.7 03/24/2020 08:56 AM    ALT (SGPT) 39 03/24/2020 08:56 AM    Alk. phosphatase 92 03/24/2020 08:56 AM    Protein, total 7.0 03/24/2020 08:56 AM    Albumin 3.6 03/24/2020 08:56 AM    Globulin 3.4 03/24/2020 08:56 AM     Lab Results   Component Value Date/Time    Reticulocyte count 1.8 11/10/2016 02:00 PM    Iron % saturation 11 (L) 11/10/2016 02:00 PM    TIBC 240 (L) 11/10/2016 02:00 PM    Ferritin 100 11/10/2016 02:00 PM    Vitamin B12 415 11/10/2016 02:00 PM    Folate 15.6 11/10/2016 02:00 PM    Haptoglobin 186 11/11/2016 12:04 PM     10/10/2019 10:27 AM    Beta-2 Microglobulin, serum 4.4 (H) 11/15/2016 05:21 AM    TSH 4.06 (H) 11/10/2016 11:21 AM    Hep C  virus Ab Interp. NONREACTIVE 06/14/2018 10:09 AM     Lab Results   Component Value Date/Time    INR 1.0 06/14/2018 10:09 AM    aPTT 31.6 06/14/2018 10:09 AM    Fibrinogen 215 11/11/2016 12:04 PM       HepB/C negative  TTE 11/10/2016: EF 60%    Bone marrow biopsy 11/29/2016: Hypercellular marrow involved by mantle cell lymphoma    Bone marrow biopsy 5/23/17: no evidence of lymphoma    US abdomen 5/29/2018:  1. Increased hepatic echogenicity compatible with diffuse hepatocellular  process, most commonly seen in steatosis. 2. Areas of compromised evaluation due to extensive bowel gas as above with  nonvisualization of gallbladder and common bile duct. CT C/A/P 8/6/2018: No lymphadenopathy in the chest, abdomen or pelvis. CT C/A/P 1/18/2019: No lymphadenopathy in the chest, abdomen, or pelvis. No significant change. No acute process. CT C/A/P 7/11/2019:  No evidence of lymphadenopathy.  Pleural-based nodule left upper lobe unchanged. Cholelithiasis    CT C/A/P 7/9/2020:   1. New enlarged bilateral axillary lymph nodes, left greater than right, and new  enlarged left iliac chain and femoral lymph nodes, highly suspicious for  lymphoma recurrence. 2. Stable splenomegaly. 3. Hepatic steatosis. 4. Cholelithiasis. 5. Additional stable findings as above. Assessment:   1) Mantle cell lymphoma  Stage IV  His MIPI score was 6.24, which is High Risk and cooresponds to a median survival of 37 months and 5-year OS of 20%. He has completed chemotherapy with R-Bendamustine for 6 cycles, achieving a complete response. He decided against consolidative transplant or maintenance rituximab, and he is currently being followed with surveillance. Unfortunately, his recent CT is concerning for recurrence with new axillary, iliac, and femoral adenopathy. I recommend a PET/CT to further evaluate. Based on this, we may perform a biopsy of one of his alize lesions, as well as a bone marrow biopsy. My tentative plan for treatment will be a BTK such as acalabrutinib. However, we discussed the option of referral to [facility] or [facility] for a second opinion and for discussion of trial options and/or CAR-T cell therapy. 2) DVT, PE  Diagnosed 11/11/2016. Malignancy associated. Previously on lovenox. Currently, on apixaban BID. I have recommended long-term anticoagulation. 3) Thrombocytopenia  Intermittent, mild, resolved on last several checks. Possibly from splenomegaly/cirrhosis. Monitor. 4) HTN  High again today. He believes it is white coat HTN. I recommended he follow up with his PCP. 5) Port care  Continue port flushes every 6-12 weeks    Plan:     · PET/CT  · Possible lymph node biopsy and bone marrow biopsy pending these results  · Continue apixiban 5mg PO BID, but will need to hold for biopsy  · Return to see me to discuss above results    He has virtual visit capability.     Signed By: Veronica Madrigal MD

## 2020-07-21 ENCOUNTER — HOSPITAL ENCOUNTER (OUTPATIENT)
Dept: INFUSION THERAPY | Age: 75
Discharge: HOME | End: 2020-07-21
Payer: MEDICARE

## 2020-07-21 ENCOUNTER — OFFICE VISIT (OUTPATIENT)
Dept: ONCOLOGY | Age: 75
End: 2020-07-21

## 2020-07-21 VITALS
BODY MASS INDEX: 36.82 KG/M2 | TEMPERATURE: 97.7 F | SYSTOLIC BLOOD PRESSURE: 159 MMHG | RESPIRATION RATE: 20 BRPM | DIASTOLIC BLOOD PRESSURE: 93 MMHG | HEIGHT: 71 IN | OXYGEN SATURATION: 98 % | WEIGHT: 263 LBS | HEART RATE: 71 BPM

## 2020-07-21 VITALS
SYSTOLIC BLOOD PRESSURE: 159 MMHG | RESPIRATION RATE: 18 BRPM | TEMPERATURE: 97 F | OXYGEN SATURATION: 91 % | HEART RATE: 68 BPM | DIASTOLIC BLOOD PRESSURE: 90 MMHG

## 2020-07-21 DIAGNOSIS — C83.18 MANTLE CELL LYMPHOMA OF LYMPH NODES OF MULTIPLE REGIONS (HCC): Primary | ICD-10-CM

## 2020-07-21 LAB
ALBUMIN SERPL-MCNC: 3.7 G/DL (ref 3.5–5)
ALBUMIN/GLOB SERPL: 1 {RATIO} (ref 1.1–2.2)
ALP SERPL-CCNC: 93 U/L (ref 45–117)
ALT SERPL-CCNC: 33 U/L (ref 12–78)
ANION GAP SERPL CALC-SCNC: 3 MMOL/L (ref 5–15)
AST SERPL-CCNC: 24 U/L (ref 15–37)
BASO+EOS+MONOS # BLD AUTO: 0.7 K/UL (ref 0.2–1.2)
BASO+EOS+MONOS NFR BLD AUTO: 14 % (ref 3.2–16.9)
BILIRUB SERPL-MCNC: 0.6 MG/DL (ref 0.2–1)
BUN SERPL-MCNC: 24 MG/DL (ref 6–20)
BUN/CREAT SERPL: 22 (ref 12–20)
CALCIUM SERPL-MCNC: 8.8 MG/DL (ref 8.5–10.1)
CHLORIDE SERPL-SCNC: 105 MMOL/L (ref 97–108)
CO2 SERPL-SCNC: 31 MMOL/L (ref 21–32)
CREAT SERPL-MCNC: 1.1 MG/DL (ref 0.7–1.3)
DIFFERENTIAL METHOD BLD: NORMAL
ERYTHROCYTE [DISTWIDTH] IN BLOOD BY AUTOMATED COUNT: 13.4 % (ref 11.8–15.8)
GLOBULIN SER CALC-MCNC: 3.6 G/DL (ref 2–4)
GLUCOSE SERPL-MCNC: 104 MG/DL (ref 65–100)
HCT VFR BLD AUTO: 41.2 % (ref 36.6–50.3)
HGB BLD-MCNC: 14 G/DL (ref 12.1–17)
LYMPHOCYTES # BLD: 1.4 K/UL (ref 0.8–3.5)
LYMPHOCYTES NFR BLD: 29 % (ref 12–49)
MCH RBC QN AUTO: 31.4 PG (ref 26–34)
MCHC RBC AUTO-ENTMCNC: 34 G/DL (ref 30–36.5)
MCV RBC AUTO: 92.4 FL (ref 80–99)
NEUTS SEG # BLD: 2.9 K/UL (ref 1.8–8)
NEUTS SEG NFR BLD: 58 % (ref 32–75)
PLATELET # BLD AUTO: 169 K/UL (ref 150–400)
POTASSIUM SERPL-SCNC: 4.3 MMOL/L (ref 3.5–5.1)
PROT SERPL-MCNC: 7.3 G/DL (ref 6.4–8.2)
RBC # BLD AUTO: 4.46 M/UL (ref 4.1–5.7)
SODIUM SERPL-SCNC: 139 MMOL/L (ref 136–145)
WBC # BLD AUTO: 5 K/UL (ref 4.1–11.1)

## 2020-07-21 PROCEDURE — 77030012965 HC NDL HUBR BBMI -A

## 2020-07-21 PROCEDURE — 74011000250 HC RX REV CODE- 250: Performed by: NURSE PRACTITIONER

## 2020-07-21 PROCEDURE — 80053 COMPREHEN METABOLIC PANEL: CPT

## 2020-07-21 PROCEDURE — 36591 DRAW BLOOD OFF VENOUS DEVICE: CPT

## 2020-07-21 PROCEDURE — 74011250636 HC RX REV CODE- 250/636: Performed by: NURSE PRACTITIONER

## 2020-07-21 PROCEDURE — 85025 COMPLETE CBC W/AUTO DIFF WBC: CPT

## 2020-07-21 RX ORDER — SODIUM CHLORIDE 0.9 % (FLUSH) 0.9 %
5-10 SYRINGE (ML) INJECTION AS NEEDED
Status: DISPENSED | OUTPATIENT
Start: 2020-07-21 | End: 2020-07-21

## 2020-07-21 RX ORDER — SODIUM CHLORIDE 9 MG/ML
10 INJECTION INTRAMUSCULAR; INTRAVENOUS; SUBCUTANEOUS AS NEEDED
Status: ACTIVE | OUTPATIENT
Start: 2020-07-21 | End: 2020-07-21

## 2020-07-21 RX ORDER — HEPARIN 100 UNIT/ML
500 SYRINGE INTRAVENOUS AS NEEDED
Status: ACTIVE | OUTPATIENT
Start: 2020-07-21 | End: 2020-07-21

## 2020-07-21 RX ADMIN — Medication 10 ML: at 08:50

## 2020-07-21 RX ADMIN — SODIUM CHLORIDE 10 ML: 9 INJECTION, SOLUTION INTRAMUSCULAR; INTRAVENOUS; SUBCUTANEOUS at 08:50

## 2020-07-21 RX ADMIN — Medication 500 UNITS: at 08:50

## 2020-07-21 NOTE — PROGRESS NOTES
Outpatient Infusion Center Short Visit Progress Note     Patient admitted to [facility] for Lab draw from [facility] ambulatory in stable condition. Assessment completed. No new concerns voiced. Vital Signs:  Visit Vitals  /90   Pulse 68   Temp 97 °F (36.1 °C)   Resp 18   SpO2 91%         R PAC with positive blood return. CBC and CMP drawn and sent for processing. Lab Results:  Recent Results (from the past 12 hour(s))   CBC WITH 3 PART DIFF    Collection Time: 07/21/20  8:51 AM   Result Value Ref Range    WBC 5.0 4.1 - 11.1 K/uL    RBC 4.46 4.10 - 5.70 M/uL    HGB 14.0 12.1 - 17.0 g/dL    HCT 41.2 36.6 - 50.3 %    MCV 92.4 80.0 - 99.0 FL    MCH 31.4 26.0 - 34.0 PG    MCHC 34.0 30.0 - 36.5 g/dL    RDW 13.4 11.8 - 15.8 %    PLATELET 793 538 - 736 K/uL    NEUTROPHILS 58 32 - 75 %    MIXED CELLS 14 3.2 - 16.9 %    LYMPHOCYTES 29 12 - 49 %    ABS. NEUTROPHILS 2.9 1.8 - 8.0 K/UL    ABS. MIXED CELLS 0.7 0.2 - 1.2 K/uL    ABS. LYMPHOCYTES 1.4 0.8 - 3.5 K/UL    DF AUTOMATED               Medications:  Medications Administered     0.9% sodium chloride injection 10 mL     Admin Date  07/21/2020 Action  Given Dose  10 mL Route  IntraVENous Administered By  Carlin Luu RN          heparin (porcine) pf 500 Units     Admin Date  07/21/2020 Action  Given Dose  500 Units Route  IntraVENous Administered By  Carlin Luu RN          sodium chloride (NS) flush 5-10 mL     Admin Date  07/21/2020 Action  Given Dose  10 mL Route  IntraVENous Administered By  Carlin Luu RN                Patient tolerated treatment well. Port flushed, heparinized and deaccessed per protocol. Patient discharged from [place] [address] ambulatory in no distress. Patient aware of next appointment.     Future Appointments   Date Time Provider Bola Jara   7/21/2020  9:30 AM Britni Hartman MD [address] [place] [address], [facility] [address]   10/13/2020  9:00 AM SS INF7 CH2 <1H [facility]

## 2020-07-21 NOTE — PROGRESS NOTES
Mirza Burton is a 66 y.o. male follow up for lymphoma. 1. Have you been to the ER, urgent care clinic since your last visit? Hospitalized since your last visit?no     2. Have you seen or consulted any other health care providers outside of the [address] [place] [address] since your last visit? Include any pap smears or colon screening.  no

## 2020-07-23 ENCOUNTER — TELEPHONE (OUTPATIENT)
Dept: ONCOLOGY | Age: 75
End: 2020-07-23

## 2020-07-23 ENCOUNTER — HOSPITAL ENCOUNTER (OUTPATIENT)
Dept: PET IMAGING | Age: 75
Discharge: HOME | End: 2020-07-23
Attending: INTERNAL MEDICINE
Payer: MEDICARE

## 2020-07-23 VITALS — HEIGHT: 71 IN | WEIGHT: 263 LBS | BODY MASS INDEX: 36.82 KG/M2

## 2020-07-23 DIAGNOSIS — C83.18 MANTLE CELL LYMPHOMA OF LYMPH NODES OF MULTIPLE REGIONS (HCC): ICD-10-CM

## 2020-07-23 DIAGNOSIS — C83.18 MANTLE CELL LYMPHOMA OF LYMPH NODES OF MULTIPLE REGIONS (HCC): Primary | ICD-10-CM

## 2020-07-23 PROCEDURE — A9552 F18 FDG: HCPCS

## 2020-07-23 RX ORDER — SODIUM CHLORIDE 0.9 % (FLUSH) 0.9 %
10 SYRINGE (ML) INJECTION
Status: COMPLETED | OUTPATIENT
Start: 2020-07-23 | End: 2020-07-23

## 2020-07-23 RX ADMIN — Medication 10 ML: at 08:52

## 2020-07-23 NOTE — TELEPHONE ENCOUNTER
[address] at [facility]  [phone]    PET reviewed and very concerning for recurrence. I called and discussed with the patient. I recommend an US guided biopsy of one of his nodes, as well as a bone marrow biopsy. He is agreeable. We discussed holding his apxiaban for 2 days prior, and getting COVID19 testing 4 days prior. Follow up with me 1-2 weeks after biopsies.

## 2020-08-01 ENCOUNTER — HOSPITAL ENCOUNTER (OUTPATIENT)
Dept: LAB | Age: 75
Discharge: HOME | End: 2020-08-01
Payer: MEDICARE

## 2020-08-01 DIAGNOSIS — U07.1 COVID-19: ICD-10-CM

## 2020-08-01 PROCEDURE — 87635 SARS-COV-2 COVID-19 AMP PRB: CPT

## 2020-08-02 LAB — SARS-COV-2, COV2NT: NOT DETECTED

## 2020-08-05 ENCOUNTER — HOSPITAL ENCOUNTER (OUTPATIENT)
Dept: ULTRASOUND IMAGING | Age: 75
Discharge: HOME | End: 2020-08-05
Attending: INTERNAL MEDICINE
Payer: MEDICARE

## 2020-08-05 ENCOUNTER — HOSPITAL ENCOUNTER (OUTPATIENT)
Dept: CT IMAGING | Age: 75
Discharge: HOME | End: 2020-08-05
Attending: INTERNAL MEDICINE
Payer: MEDICARE

## 2020-08-05 VITALS
HEIGHT: 71 IN | BODY MASS INDEX: 33.6 KG/M2 | RESPIRATION RATE: 21 BRPM | OXYGEN SATURATION: 94 % | SYSTOLIC BLOOD PRESSURE: 132 MMHG | WEIGHT: 240 LBS | HEART RATE: 59 BPM | DIASTOLIC BLOOD PRESSURE: 76 MMHG

## 2020-08-05 DIAGNOSIS — C83.18 MANTLE CELL LYMPHOMA OF LYMPH NODES OF MULTIPLE REGIONS (HCC): ICD-10-CM

## 2020-08-05 LAB
BASOPHILS # BLD: 0 K/UL (ref 0–0.1)
BASOPHILS NFR BLD: 1 % (ref 0–1)
DIFFERENTIAL METHOD BLD: NORMAL
EOSINOPHIL # BLD: 0.2 K/UL (ref 0–0.4)
EOSINOPHIL NFR BLD: 4 % (ref 0–7)
ERYTHROCYTE [DISTWIDTH] IN BLOOD BY AUTOMATED COUNT: 12.5 % (ref 11.5–14.5)
HCT VFR BLD AUTO: 41 % (ref 36.6–50.3)
HGB BLD-MCNC: 13.4 G/DL (ref 12.1–17)
IMM GRANULOCYTES # BLD AUTO: 0 K/UL (ref 0–0.04)
IMM GRANULOCYTES NFR BLD AUTO: 0 % (ref 0–0.5)
LYMPHOCYTES # BLD: 1.3 K/UL (ref 0.8–3.5)
LYMPHOCYTES NFR BLD: 27 % (ref 12–49)
MCH RBC QN AUTO: 30.1 PG (ref 26–34)
MCHC RBC AUTO-ENTMCNC: 32.7 G/DL (ref 30–36.5)
MCV RBC AUTO: 92.1 FL (ref 80–99)
MONOCYTES # BLD: 0.6 K/UL (ref 0–1)
MONOCYTES NFR BLD: 13 % (ref 5–13)
NEUTS SEG # BLD: 2.7 K/UL (ref 1.8–8)
NEUTS SEG NFR BLD: 55 % (ref 32–75)
NRBC # BLD: 0 K/UL (ref 0–0.01)
NRBC BLD-RTO: 0 PER 100 WBC
PLATELET # BLD AUTO: 154 K/UL (ref 150–400)
PMV BLD AUTO: 9.3 FL (ref 8.9–12.9)
RBC # BLD AUTO: 4.45 M/UL (ref 4.1–5.7)
WBC # BLD AUTO: 4.8 K/UL (ref 4.1–11.1)

## 2020-08-05 PROCEDURE — 77030003560 HC NDL HUBR BARD -A

## 2020-08-05 PROCEDURE — 77030028872 HC BN BIOP NDL ON CNTRL TY TELE -C

## 2020-08-05 PROCEDURE — 88305 TISSUE EXAM BY PATHOLOGIST: CPT

## 2020-08-05 PROCEDURE — 88341 IMHCHEM/IMCYTCHM EA ADD ANTB: CPT

## 2020-08-05 PROCEDURE — 38221 DX BONE MARROW BIOPSIES: CPT

## 2020-08-05 PROCEDURE — 99152 MOD SED SAME PHYS/QHP 5/>YRS: CPT

## 2020-08-05 PROCEDURE — 74011000250 HC RX REV CODE- 250: Performed by: INTERNAL MEDICINE

## 2020-08-05 PROCEDURE — 88311 DECALCIFY TISSUE: CPT

## 2020-08-05 PROCEDURE — 77030014115

## 2020-08-05 PROCEDURE — 88185 FLOWCYTOMETRY/TC ADD-ON: CPT

## 2020-08-05 PROCEDURE — 85025 COMPLETE CBC W/AUTO DIFF WBC: CPT

## 2020-08-05 PROCEDURE — 88184 FLOWCYTOMETRY/ TC 1 MARKER: CPT

## 2020-08-05 PROCEDURE — 38505 NEEDLE BIOPSY LYMPH NODES: CPT

## 2020-08-05 PROCEDURE — 88342 IMHCHEM/IMCYTCHM 1ST ANTB: CPT

## 2020-08-05 PROCEDURE — 77030003503 HC NDL BIOP TISS BD -B

## 2020-08-05 PROCEDURE — 88313 SPECIAL STAINS GROUP 2: CPT

## 2020-08-05 PROCEDURE — 88333 PATH CONSLTJ SURG CYTO XM 1: CPT

## 2020-08-05 PROCEDURE — 74011250636 HC RX REV CODE- 250/636: Performed by: RADIOLOGY

## 2020-08-05 PROCEDURE — 36415 COLL VENOUS BLD VENIPUNCTURE: CPT

## 2020-08-05 RX ORDER — LIDOCAINE HYDROCHLORIDE 10 MG/ML
10 INJECTION, SOLUTION EPIDURAL; INFILTRATION; INTRACAUDAL; PERINEURAL
Status: COMPLETED | OUTPATIENT
Start: 2020-08-05 | End: 2020-08-05

## 2020-08-05 RX ORDER — HEPARIN 100 UNIT/ML
500 SYRINGE INTRAVENOUS AS NEEDED
Status: DISCONTINUED | OUTPATIENT
Start: 2020-08-05 | End: 2020-08-05

## 2020-08-05 RX ORDER — FENTANYL CITRATE 50 UG/ML
100 INJECTION, SOLUTION INTRAMUSCULAR; INTRAVENOUS
Status: DISCONTINUED | OUTPATIENT
Start: 2020-08-05 | End: 2020-08-05

## 2020-08-05 RX ORDER — MIDAZOLAM HYDROCHLORIDE 1 MG/ML
5 INJECTION, SOLUTION INTRAMUSCULAR; INTRAVENOUS
Status: DISCONTINUED | OUTPATIENT
Start: 2020-08-05 | End: 2020-08-05

## 2020-08-05 RX ADMIN — Medication 500 UNITS: at 11:45

## 2020-08-05 RX ADMIN — FENTANYL CITRATE 25 MCG: 50 INJECTION, SOLUTION INTRAMUSCULAR; INTRAVENOUS at 11:02

## 2020-08-05 RX ADMIN — LIDOCAINE HYDROCHLORIDE 10 ML: 10 INJECTION, SOLUTION EPIDURAL; INFILTRATION; INTRACAUDAL; PERINEURAL at 09:20

## 2020-08-05 RX ADMIN — MIDAZOLAM HYDROCHLORIDE 1 MG: 1 INJECTION, SOLUTION INTRAMUSCULAR; INTRAVENOUS at 11:02

## 2020-08-05 RX ADMIN — MIDAZOLAM HYDROCHLORIDE 1 MG: 1 INJECTION, SOLUTION INTRAMUSCULAR; INTRAVENOUS at 10:58

## 2020-08-05 RX ADMIN — MIDAZOLAM HYDROCHLORIDE 1 MG: 1 INJECTION, SOLUTION INTRAMUSCULAR; INTRAVENOUS at 11:06

## 2020-08-05 RX ADMIN — FENTANYL CITRATE 25 MCG: 50 INJECTION, SOLUTION INTRAMUSCULAR; INTRAVENOUS at 11:06

## 2020-08-05 RX ADMIN — FENTANYL CITRATE 25 MCG: 50 INJECTION, SOLUTION INTRAMUSCULAR; INTRAVENOUS at 10:58

## 2020-08-05 NOTE — PROGRESS NOTES
Pt received to [facility] from [address] s/p lymph node biopsy. Confirmed NPO/NKA for bone marrow bx. Port accessed with 1\" Aniya Cuban, positive blood return and flush. Vitals stable, Mallampati 2 with 4 fingers, LS clear, HS S1, S2. Dr Fausto Tanner here for consent. 1050--To CT, timeout at 1106, start time 1107. Stop time 1116. Pt tolerated well, total of 3 mg versed and 75 mcg fentanyl given for procedure. Clean dressing applied to pt's low back, returned to [facility] in stable condition, toelrating PO. Pt reviewed d/c directions, and ride was updated on status. Port deaccessed with heparin flush, pt dressed and taken out to [facility] for discharge.

## 2020-08-05 NOTE — DISCHARGE INSTRUCTIONS
Sedation for a Medical Procedure: After Your Visit  Instructions. Sedatives are used to relax you for a procedure. You may or may not be awake during the procedure. Common side effects of sedation medications include:                   Drowsiness, dizziness, euphoria, sleepiness or confusion. I              Unsteady gait. Loss of fine muscle control and delayed reaction time. Visual                     disturbances, difficulty focusing and blurred vision. Impaired memory recall. Follow-up care is a key component for your health and safety. Be sure to make and go to all medical appointments. Call your doctor if you are having problems. It's also a good idea to keep a list of your allergies, medicines with doses and test results on hand    Home care following your sedation procedure: You may experience some of these side effects or you may not be aware of subtle changes in your behavior or reaction time. Because you received these medications, we are giving you the following instructions: Activity   For your safety, you should not drive until the medicine wears off and you can think clearly and react easily. Do not drive for 24 hours. Rest when you feel tired. Getting enough sleep will help you recover. Diet    You can eat your normal diet. If your stomach is upset, try bland, low-fat foods like plain rice, broiled chicken, toast, and yogurt. Drink plenty of fluids unless your doctor advised you to limit your fluids. Do not consume alcoholic beverages for 24 hours. Instructions  Do not make important personal, business, or legal decisions for 24 hours. Move slowly and carefully, do not make sudden position changes. Be alert for dizziness or lightheadedness and move accordingly. Have a responsible person assist you. Do not drive for 24 hours. Do not operate equipment for 24 hours. [place], power tools, kitchen appliances, etc.)    Discharge medications:  Resume prior to test medications as prescribed by your personal physician. If you have any questions or concerns call Radiology RN at [phone]  After hours call Radiology on-call at [phone]    Call [phone] any time you think you may need emergency care. For example:                Call if you passed out (lost consciousness). The medicine is not wearing off and you cannot think clearly. Watch closely for changes in your health, and be sure to contact your doctor if                  you have any problems. Where can you learn more? Go to [employer]   Enter G817 in the search box to learn more about \"Sedation for a Medical Procedure: After Your Visit. \"    Patient Education        Bone Marrow Aspiration and Biopsy: What to Expect at Home  Your Recovery  The biopsy site may feel sore for several days. It can help to walk, take pain medicine, and put ice packs on the site. You will probably be able to return to work and your usual activities the day after the procedure. Your doctor or nurse will call you with the results of your test.  This care sheet gives you a general idea about how long it will take for you to recover. But each person recovers at a different pace. Follow the steps below to get better as quickly as possible. How can you care for yourself at home? Activity  · Rest when you feel tired. Getting enough sleep will help you recover. · You may drive when you are no longer taking pain pills and can quickly move your foot from the gas pedal to the brake. You must also be able to sit comfortably for a long period of time, even if you do not plan to go far. You might get caught in traffic. · Most people are able to return to work the day after the procedure. Medicines  · Your doctor will tell you if and when you can restart your medicines.  He or she will also give you instructions about taking any new medicines. · If you take aspirin or some other blood thinner, ask your doctor if and when to start taking it again. Make sure that you understand exactly what your doctor wants you to do. · Be safe with medicines. Take pain medicines exactly as directed. ? If the doctor gave you a prescription medicine for pain, take it as prescribed. ? If you are not taking a prescription pain medicine, take an over-the-counter medicine such as acetaminophen (Tylenol), ibuprofen (Advil, Motrin), or naproxen (Aleve). Read and follow all instructions on the label. ? Do not take two or more pain medicines at the same time unless the doctor told you to. Many pain medicines have acetaminophen, which is Tylenol. Too much acetaminophen (Tylenol) can be harmful. · If you think your pain medicine is making you sick to your stomach:  ? Take your medicine after meals (unless your doctor has told you not to). ? Ask your doctor for a different pain medicine. · If your doctor prescribed antibiotics, take them as directed. Do not stop taking them just because you feel better. Ice  · Put ice or a cold pack on the biopsy site for 10 to 20 minutes at a time. Put a thin cloth between the ice and your skin. Follow-up care is a key part of your treatment and safety. Be sure to make and go to all appointments, and call your doctor if you are having problems. It's also a good idea to know your test results and keep a list of the medicines you take. When should you call for help? [id number] anytime you think you may need emergency care. For example, call if:  · You passed out (lost consciousness). Call your doctor now or seek immediate medical care if:  · You have signs of infection, such as:  ? Increased pain, swelling, warmth, or redness. ? Red streaks leading from the biopsy site. ? Pus draining from the biopsy site. ? Swollen lymph nodes in your neck, armpits, or groin. ? A fever.   Watch closely for any changes in your health, and be sure to contact your doctor if:  · You are not getting better as expected. Where can you learn more? Go to http://luis alfredo-chuck.info/  Enter E148 in the search box to learn more about \"Bone Marrow Aspiration and Biopsy: What to Expect at Home. \"  Current as of: December 9, 2019               Content Version: 12.5  © 1078-2209 [employer]. Care instructions adapted under license by [employer] (which disclaims liability or warranty for this information). If you have questions about a medical condition or this instruction, always ask your healthcare professional. [place] [address] any warranty or liability for your use of this information.

## 2020-08-05 NOTE — H&P
Interventional Radiology History and Physical (Outpatient)    2020    Patient: Lisa Chance 66 y.o. male     Referring Physician:  Lila Nolan MD    Chief Complaint: need BM Bx    History of Present Illness: lymphoma follow up    History:  Past Medical History:   Diagnosis Date    Arthritis     knees    Ill-defined condition 2015    episode of hypertension pcp felt it was related to lost of spouse no further episodes noted    Undulant fever     as a child     Family History   Problem Relation Age of Onset    Coronary Artery Disease Father     Heart Disease Father     No Known Problems Brother     Cancer Sister      Social History     Socioeconomic History    Marital status:      Spouse name: Not on file    Number of children: Not on file    Years of education: Not on file    Highest education level: Not on file   Occupational History    Not on file   Social Needs    Financial resource strain: Not on file    Food insecurity     Worry: Not on file     Inability: Not on file    Transportation needs     Medical: Not on file     Non-medical: Not on file   Tobacco Use    Smoking status: Former Smoker     Packs/day: 0.10     Years: 4.00     Pack years: 0.40     Last attempt to quit: 1972     Years since quittin.0    Smokeless tobacco: Never Used   Substance and Sexual Activity    Alcohol use:  Yes     Alcohol/week: 2.0 standard drinks     Types: 2 Cans of beer per week    Drug use: No    Sexual activity: Never   Lifestyle    Physical activity     Days per week: Not on file     Minutes per session: Not on file    Stress: Not on file   Relationships    Social connections     Talks on phone: Not on file     Gets together: Not on file     Attends Christian service: Not on file     Active member of club or organization: Not on file     Attends meetings of clubs or organizations: Not on file     Relationship status: Not on file    Intimate partner violence     Fear of current or ex partner: Not on file     Emotionally abused: Not on file     Physically abused: Not on file     Forced sexual activity: Not on file   Other Topics Concern    Not on file   Social History Narrative    Not on file       Allergies: No Known Allergies    Prior to Admission Medications:  Prior to Admission medications    Medication Sig Start Date End Date Taking? Authorizing Provider   ELIQUIS 5 mg tablet TAKE 1 TABLET TWICE A DAY 8/26/19  Yes Alber Hartman MD   lidocaine-prilocaine (EMLA) topical cream Apply  to affected area as needed for Pain (Apply 30-60 min. prior to having your port accessed). 4/18/19  Yes Andreea Herrera, NP       Physical Exam:    Blood pressure (!) 140/91, pulse 60, resp. rate 11, height 5' 11\" (1.803 m), weight 108.9 kg (240 lb). General: alert, cooperative, no distress, appears stated age  Heart: rrr  Lungs: clear to auscultation bilaterally  Abdomen: soft,   Neuro: grossly intact  Extremities: wnl    Plan of Care/Planned Procedure:  Risks, benefits, and alternatives reviewed with patient and he agrees to proceed with the procedure.      Eugenio Givens MD

## 2020-08-19 ENCOUNTER — OFFICE VISIT (OUTPATIENT)
Dept: ONCOLOGY | Age: 75
End: 2020-08-19
Payer: MEDICARE

## 2020-08-19 VITALS
BODY MASS INDEX: 33.47 KG/M2 | TEMPERATURE: 97.1 F | SYSTOLIC BLOOD PRESSURE: 131 MMHG | HEIGHT: 71 IN | OXYGEN SATURATION: 94 % | HEART RATE: 79 BPM | DIASTOLIC BLOOD PRESSURE: 82 MMHG | RESPIRATION RATE: 20 BRPM

## 2020-08-19 DIAGNOSIS — C83.18 MANTLE CELL LYMPHOMA OF LYMPH NODES OF MULTIPLE REGIONS (HCC): Primary | ICD-10-CM

## 2020-08-19 DIAGNOSIS — I82.403 DEEP VENOUS EMBOLISM AND THROMBOSIS OF BOTH LOWER EXTREMITIES (HCC): ICD-10-CM

## 2020-08-19 PROCEDURE — G8419 CALC BMI OUT NRM PARAM NOF/U: HCPCS | Performed by: INTERNAL MEDICINE

## 2020-08-19 PROCEDURE — G8536 NO DOC ELDER MAL SCRN: HCPCS | Performed by: INTERNAL MEDICINE

## 2020-08-19 PROCEDURE — G9717 DOC PT DX DEP/BP F/U NT REQ: HCPCS | Performed by: INTERNAL MEDICINE

## 2020-08-19 PROCEDURE — 1101F PT FALLS ASSESS-DOCD LE1/YR: CPT | Performed by: INTERNAL MEDICINE

## 2020-08-19 PROCEDURE — G8427 DOCREV CUR MEDS BY ELIG CLIN: HCPCS | Performed by: INTERNAL MEDICINE

## 2020-08-19 PROCEDURE — G0463 HOSPITAL OUTPT CLINIC VISIT: HCPCS | Performed by: NURSE PRACTITIONER

## 2020-08-19 PROCEDURE — 99215 OFFICE O/P EST HI 40 MIN: CPT | Performed by: INTERNAL MEDICINE

## 2020-08-19 NOTE — PATIENT INSTRUCTIONS
Prognosis: Intermediate This is our best current assessment. Cancers respond differently to treatment. Overall prognosis depends on many factors including other conditions, cancer stage, side effects, and other unforeseen events. Goal of therapy: Palliative Expected response to treatment:  Good: Anticipate prolonged, long-term control of cancer Treatment benefits and harms:  We discussed potential short term side effects to include:GI upset, increased infection risk, anemia, alopecia, increased risk of bleeding and fatigue Long term side effects of treatment:  bone marrow suppression Quality of life: Quality of life concerns have been addressed. Treatment as outlined is expected to have minimal impact on patients quality of life.

## 2020-08-19 NOTE — PROGRESS NOTES
Eliane Malone is a 66 y.o. male follow up for lymphoma. 1. Have you been to the ER, urgent care clinic since your last visit? Hospitalized since your last visit?no     2. Have you seen or consulted any other health care providers outside of the [address] [place] [address] since your last visit? Include any pap smears or colon screening.  No

## 2020-08-19 NOTE — PROGRESS NOTES
[facility] at [address], [address] [facility] [address] [facility]  W: [phone]  F: [phone]      Reason for Visit:   Neil Colbert is a 66 y.o. male who is seen for follow up of lymphoma. Treatment History:   · CTA Chest 11/11/2016: PE in left lower lobe pulmonary arteries, extensive adneopathy  · CT A/P 11/12/2016: Cirrhosis, massive splenomegaly, ascites, massive lymphadenopathy  · US guided axillary node biopsy 11/14/2016: CD5+ B-cell non-hodgkin lymphoma, phenotype most consistent with mantle cell lymphoma. FISH with t(11;14)  · PET-CT 11/23/2016: Marked splenomegaly with increased metabolic activity consistent with lymphoma. Bilateral cervical and axillary adenopathy. Mediastinal and right hilar and left diaphragmatic adenopathy. Bilateral pelvic and inguinal adenopathy. Multiple small mesenteric and retroperitoneal nodes with low grade or no activity. · BMBx 11/29/2016: Hypercellular marrow involved by mantle cell lymphoma  · Stage IV Mantle Cell Lymphoma  · Palliative chemotherapy with Rituximab and Bendamustine x6 cycles from 12/8/2016 to 5/12/17  · Bone marrow biopsy 5/23/2017: no evidence of lymphoma  · PET/CT 6/7/2017: There has been marked improvement in the adenopathy in the cervical region, axilla, mediastinum, abdomen and pelvis which now demonstrate no increased metabolic activity. There has been marked decrease in the spleen which now demonstrates no increased metabolic activity. · CT C/A/P 7/9/2020: New enlarged bilateral axillary lymph nodes, left greater than right, and new enlarged left iliac chain and femoral lymph nodes, highly suspicious for lymphoma recurrence. · PET 1/65/3916: Hypermetabolic bilateral axillary, pelvic, and left inguinal lymph nodes. Hypermetabolic soft tissue nodules in the arms bilaterally, face, and left posterior neck. Small but hypermetabolic left parotid nodule may represent an intraparotid lymph node.  Single hypermetabolic focus in the right glenoid may represent an underlying osseous lesion. · US guided lymph biopsy of left axillary node on 8/5/2020 consistent with mantle cell lymphoma. · Bone marrow biopsy 8/5/2020 negative for involvement    History of Present Illness:   Feeling well overall. No new complaints. Denies night sweats, fever or chills. No new aches or pains. Has arthritis symptoms in knees, this is chronic for him. Has been busy with multiple tests/procedures lately. He is unaccompanied today. PAST HISTORY: The following sections were reviewed and updated in the EMR as appropriate: PMH, SH, FH, Medications, Allergies. No Known Allergies     Review of Systems: A complete review of systems was obtained, reviewed, and scanned into the EMR. Pertinent findings reviewed above. Physical Exam:     Visit Vitals  /82 (BP 1 Location: Right arm, BP Patient Position: Sitting) Comment: . Pulse 79   Temp 97.1 °F (36.2 °C) (Temporal)   Resp 20   Ht 5' 11\" (1.803 m)   SpO2 94%   BMI 33.47 kg/m²     ECOG PS: 0  General: No distress  Eyes: PERRLA, anicteric sclerae  HENT: Atraumatic, OP clear  Neck: Supple  Lymphatic: No cervical, supraclavicular, or inguinal adenopathy  Respiratory: CTAB, normal respiratory effort  CV: Normal rate, regular rhythm, no murmurs. No edema noted. GI: Soft, nontender, nondistended, no masses, no hepatomegaly, no splenomegaly  MS: Normal gait and station. Digits without clubbing or cyanosis. Skin: No rashes, ecchymoses, or petechiae. Normal temperature, turgor, and texture. Psych: Alert, oriented, appropriate affect, normal judgment/insight    Results:     Lab Results   Component Value Date/Time    WBC 4.8 08/05/2020 10:10 AM    HGB 13.4 08/05/2020 10:10 AM    HCT 41.0 08/05/2020 10:10 AM    PLATELET 820 89/10/5135 10:10 AM    MCV 92.1 08/05/2020 10:10 AM    ABS.  NEUTROPHILS 2.7 08/05/2020 10:10 AM    Hemoglobin (POC) 10.9 (L) 01/12/2017 08:02 AM    Hematocrit (POC) 32 (L) 01/12/2017 08:02 AM     Lab Results   Component Value Date/Time    Sodium 139 07/21/2020 08:51 AM    Potassium 4.3 07/21/2020 08:51 AM    Chloride 105 07/21/2020 08:51 AM    CO2 31 07/21/2020 08:51 AM    Glucose 104 (H) 07/21/2020 08:51 AM    BUN 24 (H) 07/21/2020 08:51 AM    Creatinine 1.10 07/21/2020 08:51 AM    GFR est AA >60 07/21/2020 08:51 AM    GFR est non-AA >60 07/21/2020 08:51 AM    Calcium 8.8 07/21/2020 08:51 AM    Sodium (POC) 143 01/12/2017 08:02 AM    Potassium (POC) 4.0 01/12/2017 08:02 AM    Chloride (POC) 104 01/12/2017 08:02 AM    Glucose (POC) 156 (H) 01/12/2017 08:02 AM    BUN (POC) 23 (H) 01/12/2017 08:02 AM    Creatinine (POC) 1.0 07/09/2020 09:37 AM    Calcium, ionized (POC) 1.19 01/12/2017 08:02 AM     Lab Results   Component Value Date/Time    Bilirubin, total 0.6 07/21/2020 08:51 AM    ALT (SGPT) 33 07/21/2020 08:51 AM    Alk. phosphatase 93 07/21/2020 08:51 AM    Protein, total 7.3 07/21/2020 08:51 AM    Albumin 3.7 07/21/2020 08:51 AM    Globulin 3.6 07/21/2020 08:51 AM     Lab Results   Component Value Date/Time    Reticulocyte count 1.8 11/10/2016 02:00 PM    Iron % saturation 11 (L) 11/10/2016 02:00 PM    TIBC 240 (L) 11/10/2016 02:00 PM    Ferritin 100 11/10/2016 02:00 PM    Vitamin B12 415 11/10/2016 02:00 PM    Folate 15.6 11/10/2016 02:00 PM    Haptoglobin 186 11/11/2016 12:04 PM     10/10/2019 10:27 AM    Beta-2 Microglobulin, serum 4.4 (H) 11/15/2016 05:21 AM    TSH 4.06 (H) 11/10/2016 11:21 AM    Hep C  virus Ab Interp. NONREACTIVE 06/14/2018 10:09 AM     Lab Results   Component Value Date/Time    INR 1.0 06/14/2018 10:09 AM    aPTT 31.6 06/14/2018 10:09 AM    Fibrinogen 215 11/11/2016 12:04 PM       HepB/C negative  TTE 11/10/2016: EF 60%    Bone marrow biopsy 11/29/2016: Hypercellular marrow involved by mantle cell lymphoma    Bone marrow biopsy 5/23/17: no evidence of lymphoma    US abdomen 5/29/2018:  1.  Increased hepatic echogenicity compatible with diffuse hepatocellular  process, most commonly seen in steatosis. 2. Areas of compromised evaluation due to extensive bowel gas as above with  nonvisualization of gallbladder and common bile duct. CT C/A/P 8/6/2018: No lymphadenopathy in the chest, abdomen or pelvis. CT C/A/P 1/18/2019: No lymphadenopathy in the chest, abdomen, or pelvis. No significant change. No acute process. CT C/A/P 7/11/2019:  No evidence of lymphadenopathy. Pleural-based nodule left upper lobe unchanged. Cholelithiasis    CT C/A/P 7/9/2020:   1. New enlarged bilateral axillary lymph nodes, left greater than right, and new  enlarged left iliac chain and femoral lymph nodes, highly suspicious for  lymphoma recurrence. 2. Stable splenomegaly. 3. Hepatic steatosis. 4. Cholelithiasis. 5. Additional stable findings as above. Assessment:   1) Mantle cell lymphoma, relapsed  Stage IV  He completed chemotherapy with R-Bendamustine in 5/2017 and then declined consolidative transplant or maintenance rituximab. Unfortunately, he now has relapsed disease. This was confirmed with biopsy of left axillary node confirms recurrent disease. Fortunately, bone marrow biopsy was negative for involvement. Reviewed these results of biopsies and imaging in detail today. I recommend further therapy with BTK such as acalabrutinib. Discussed risks/benefits of therapy and given written information for his review. We reviewed risk of bleeding, afib/aflutter, neutropenia, increased risk of infection and anemia. He agrees to move forward with therapy. Will send to specialty pharmacy. Monitor labs monthly on therapy. We again discussed the option of referral to [facility] or [facility] for a second opinion and for discussion of trial options and/or CAR-T cell therapy. He will think about this and let us know if he wishes to proceed next visit. 2) DVT, PE  Diagnosed 11/11/2016. Malignancy associated. Previously on lovenox. Currently, on apixaban BID.   I have recommended long-term anticoagulation. 3) Thrombocytopenia  Intermittent, mild, resolved on last several checks. Possibly from splenomegaly/cirrhosis. Monitor. 4) HTN  Follow up with PCP.      5) Port care  Continue port flushes every 6-12 weeks    Plan:     · Initiate therapy with Acalabrutinib  · Continue apixiban 5mg PO BID  · Return to see me with labs in 4-6 weeks    Signed By: Sander Ziegler MD

## 2020-08-25 ENCOUNTER — TELEPHONE (OUTPATIENT)
Dept: ONCOLOGY | Age: 75
End: 2020-08-25

## 2020-08-25 NOTE — TELEPHONE ENCOUNTER
Called patient to inform him Edie Tsai has been trying to contact him to schedule delivery of Calquence. Pharmacy number was given to patient, he stated he will call shortly to schedule and thanked me for the reminder call.

## 2020-08-25 NOTE — TELEPHONE ENCOUNTER
Calling about interaction----cat C   Calquence and Eliquis   Will not hold up shipment just a I    Number [phone]

## 2020-09-01 ENCOUNTER — APPOINTMENT (OUTPATIENT)
Dept: INFUSION THERAPY | Age: 75
End: 2020-09-01
Payer: MEDICARE

## 2020-09-01 NOTE — TELEPHONE ENCOUNTER
Per Tammy with [facility] Fotherg[facility] patient's Sangeetha Mc was delivered on 8/26/2020 with a cost of care of $33 copay and no further financial assistance needed.

## 2020-09-02 ENCOUNTER — TELEPHONE (OUTPATIENT)
Dept: ONCOLOGY | Age: 75
End: 2020-09-02

## 2020-09-02 NOTE — TELEPHONE ENCOUNTER
[address] at [place]  [phone]    09/02/20- Called patient to check in after starting acalabrutinib last week. Stated he's doing really well. The first few days experienced some muscle aches that have resolved. He also reports a slight headache in the mornings that goes away after eating. No other complaints. Reviewed the increased risk of bleeding while on this medication with eliquis. Instructed patient to call the office with signs or symptoms of bleeding. Reviewed next appointment for PF/labs and office visit on 10/13. He verbalized understanding, no further questions or concerns.

## 2020-09-22 ENCOUNTER — TELEPHONE (OUTPATIENT)
Dept: ONCOLOGY | Age: 75
End: 2020-09-22

## 2020-10-12 NOTE — PROGRESS NOTES
[facility] [place] at [address] [facility] [address] [facility]  W: [phone]  F: [phone]      Reason for Visit:   Nitin Taylor is a 66 y.o. male who is seen for follow up of lymphoma. Treatment History:   · CTA Chest 11/11/2016: PE in left lower lobe pulmonary arteries, extensive adneopathy  · CT A/P 11/12/2016: Cirrhosis, massive splenomegaly, ascites, massive lymphadenopathy  · US guided axillary node biopsy 11/14/2016: CD5+ B-cell non-hodgkin lymphoma, phenotype most consistent with mantle cell lymphoma. FISH with t(11;14)  · PET-CT 11/23/2016: Marked splenomegaly with increased metabolic activity consistent with lymphoma. Bilateral cervical and axillary adenopathy. Mediastinal and right hilar and left diaphragmatic adenopathy. Bilateral pelvic and inguinal adenopathy. Multiple small mesenteric and retroperitoneal nodes with low grade or no activity. · BMBx 11/29/2016: Hypercellular marrow involved by mantle cell lymphoma  · Stage IV Mantle Cell Lymphoma  · Palliative chemotherapy with Rituximab and Bendamustine x6 cycles from 12/8/2016 to 5/12/17  · Bone marrow biopsy 5/23/2017: no evidence of lymphoma  · PET/CT 6/7/2017: There has been marked improvement in the adenopathy in the cervical region, axilla, mediastinum, abdomen and pelvis which now demonstrate no increased metabolic activity. There has been marked decrease in the spleen which now demonstrates no increased metabolic activity. · CT C/A/P 7/9/2020: New enlarged bilateral axillary lymph nodes, left greater than right, and new enlarged left iliac chain and femoral lymph nodes, highly suspicious for lymphoma recurrence. · PET 0/23/2591: Hypermetabolic bilateral axillary, pelvic, and left inguinal lymph nodes. Hypermetabolic soft tissue nodules in the arms bilaterally, face, and left posterior neck. Small but hypermetabolic left parotid nodule may represent an intraparotid lymph node.  Single hypermetabolic focus in the right glenoid may represent an underlying osseous lesion. · US guided lymph biopsy of left axillary node on 8/5/2020 consistent with mantle cell lymphoma. · Bone marrow biopsy 8/5/2020 negative for involvement  · Initiated palliative therapy with Acalabrutinib 100mg BID 8/26/2020    History of Present Illness:   Initiated therapy with Acalabrutinib since last visit. Had muscle aches for the first few days of therapy, but these resolved. Mild headache in the morning that resolves with eating. Denies pain. States overall, he doesn't feel that he is taking any new medication. Doesn't feel any different. He is unaccompanied today. PAST HISTORY: The following sections were reviewed and updated in the EMR as appropriate: PMH, SH, FH, Medications, Allergies. No Known Allergies     Review of Systems: A complete review of systems was obtained, reviewed, and scanned into the EMR. Pertinent findings reviewed above. Physical Exam:     Visit Vitals  BP (!) 129/91 (BP 1 Location: Right arm, BP Patient Position: Sitting)   Pulse 80   Temp 98 °F (36.7 °C) (Oral)   Resp 18   Ht 5' 11\" (1.803 m)   Wt 255 lb (115.7 kg)   SpO2 93%   BMI 35.57 kg/m²     ECOG PS: 0  General: No distress  Eyes: PERRLA, anicteric sclerae  HENT: Atraumatic, OP clear  Neck: Supple  Lymphatic: No cervical, supraclavicular, or inguinal adenopathy  Respiratory: CTAB, normal respiratory effort  CV: Normal rate, regular rhythm, no murmurs. No edema noted. GI: Soft, nontender, nondistended, no masses, no hepatomegaly, no splenomegaly  MS: Normal gait and station. Digits without clubbing or cyanosis. Skin: No rashes, ecchymoses, or petechiae. Normal temperature, turgor, and texture.   Psych: Alert, oriented, appropriate affect, normal judgment/insight    Results:     Lab Results   Component Value Date/Time    WBC 6.3 10/13/2020 10:25 AM    HGB 13.9 10/13/2020 10:25 AM    HCT 43.9 10/13/2020 10:25 AM    PLATELET 132 10/13/2020 10:25 AM    MCV 95.6 10/13/2020 10:25 AM    ABS. NEUTROPHILS 3.7 10/13/2020 10:25 AM    Hemoglobin (POC) 10.9 (L) 01/12/2017 08:02 AM    Hematocrit (POC) 32 (L) 01/12/2017 08:02 AM     Lab Results   Component Value Date/Time    Sodium 138 10/13/2020 10:25 AM    Potassium 4.5 10/13/2020 10:25 AM    Chloride 104 10/13/2020 10:25 AM    CO2 29 10/13/2020 10:25 AM    Glucose 94 10/13/2020 10:25 AM    BUN 25 (H) 10/13/2020 10:25 AM    Creatinine 1.29 10/13/2020 10:25 AM    GFR est AA >60 10/13/2020 10:25 AM    GFR est non-AA 54 (L) 10/13/2020 10:25 AM    Calcium 9.2 10/13/2020 10:25 AM    Sodium (POC) 143 01/12/2017 08:02 AM    Potassium (POC) 4.0 01/12/2017 08:02 AM    Chloride (POC) 104 01/12/2017 08:02 AM    Glucose (POC) 156 (H) 01/12/2017 08:02 AM    BUN (POC) 23 (H) 01/12/2017 08:02 AM    Creatinine (POC) 1.0 07/09/2020 09:37 AM    Calcium, ionized (POC) 1.19 01/12/2017 08:02 AM     Lab Results   Component Value Date/Time    Bilirubin, total 0.7 10/13/2020 10:25 AM    ALT (SGPT) 33 10/13/2020 10:25 AM    Alk. phosphatase 113 10/13/2020 10:25 AM    Protein, total 7.7 10/13/2020 10:25 AM    Albumin 3.9 10/13/2020 10:25 AM    Globulin 3.8 10/13/2020 10:25 AM     Lab Results   Component Value Date/Time    Reticulocyte count 1.8 11/10/2016 02:00 PM    Iron % saturation 11 (L) 11/10/2016 02:00 PM    TIBC 240 (L) 11/10/2016 02:00 PM    Ferritin 100 11/10/2016 02:00 PM    Vitamin B12 415 11/10/2016 02:00 PM    Folate 15.6 11/10/2016 02:00 PM    Haptoglobin 186 11/11/2016 12:04 PM     10/10/2019 10:27 AM    Beta-2 Microglobulin, serum 4.4 (H) 11/15/2016 05:21 AM    TSH 4.06 (H) 11/10/2016 11:21 AM    Hep C virus Ab Interp.  NONREACTIVE 06/14/2018 10:09 AM     Lab Results   Component Value Date/Time    INR 1.0 06/14/2018 10:09 AM    aPTT 31.6 06/14/2018 10:09 AM    Fibrinogen 215 11/11/2016 12:04 PM       HepB/C negative  TTE 11/10/2016: EF 60%    Bone marrow biopsy 11/29/2016: Hypercellular marrow involved by mantle cell lymphoma    Bone marrow biopsy 5/23/17: no evidence of lymphoma    US abdomen 5/29/2018:  1. Increased hepatic echogenicity compatible with diffuse hepatocellular  process, most commonly seen in steatosis. 2. Areas of compromised evaluation due to extensive bowel gas as above with  nonvisualization of gallbladder and common bile duct. CT C/A/P 8/6/2018: No lymphadenopathy in the chest, abdomen or pelvis. CT C/A/P 1/18/2019: No lymphadenopathy in the chest, abdomen, or pelvis. No significant change. No acute process. CT C/A/P 7/11/2019:  No evidence of lymphadenopathy. Pleural-based nodule left upper lobe unchanged. Cholelithiasis    CT C/A/P 7/9/2020:   1. New enlarged bilateral axillary lymph nodes, left greater than right, and new  enlarged left iliac chain and femoral lymph nodes, highly suspicious for  lymphoma recurrence. 2. Stable splenomegaly. 3. Hepatic steatosis. 4. Cholelithiasis. 5. Additional stable findings as above. Assessment:   1) Mantle cell lymphoma, relapsed  Stage IV  He completed chemotherapy with R-Bendamustine in 5/2017 and then declined consolidative transplant or maintenance rituximab. Unfortunately, he now has relapsed disease. This was confirmed with biopsy of left axillary node confirms recurrent disease. Fortunately, bone marrow biopsy was negative for involvement. Due to recurrent disease he initiated therapy with acalabrutinib since last visit. Tolerating well with exception of grade 1 headache. Proceed with therapy as ordered. Will obtain CT imaging after 3 months of therapy as his adenopathy has not been palpable on exam. Will monitor labs monthly on therapy. We again discussed the option of referral to [facility] or [facility] for a second opinion and for discussion of trial options and/or CAR-T cell therapy. He will think about this and let us know if he wishes to proceed next visit. 2) DVT, PE  Diagnosed 11/11/2016. Malignancy associated.  Previously on lovenox. Currently, on apixaban BID. I have recommended long-term anticoagulation. 3) Thrombocytopenia  Intermittent, mild, resolved on last several checks. Possibly from splenomegaly/cirrhosis. Monitor. 4) HTN  Follow up with PCP. 5) Port care  Continue port flushes every 6 weeks. He prefers to have las through port. Plan:     · Continue Acalabrutinib 100mg BID  · Continue apixiban 5mg PO BID  · CT before next visit  · Return to see me with labs in 46 weeks    Patient was seen in conjunction with Kang Harrington NP.     Signed By: Laurence Oakes MD

## 2020-10-13 ENCOUNTER — OFFICE VISIT (OUTPATIENT)
Dept: ONCOLOGY | Age: 75
End: 2020-10-13
Payer: MEDICARE

## 2020-10-13 ENCOUNTER — HOSPITAL ENCOUNTER (OUTPATIENT)
Dept: INFUSION THERAPY | Age: 75
Discharge: HOME | End: 2020-10-13
Payer: MEDICARE

## 2020-10-13 VITALS
RESPIRATION RATE: 18 BRPM | BODY MASS INDEX: 35.7 KG/M2 | WEIGHT: 255 LBS | HEIGHT: 71 IN | OXYGEN SATURATION: 93 % | DIASTOLIC BLOOD PRESSURE: 91 MMHG | SYSTOLIC BLOOD PRESSURE: 129 MMHG | HEART RATE: 80 BPM | TEMPERATURE: 98 F

## 2020-10-13 VITALS
HEART RATE: 90 BPM | RESPIRATION RATE: 18 BRPM | SYSTOLIC BLOOD PRESSURE: 140 MMHG | WEIGHT: 255.4 LBS | TEMPERATURE: 97.2 F | OXYGEN SATURATION: 92 % | DIASTOLIC BLOOD PRESSURE: 93 MMHG | BODY MASS INDEX: 35.62 KG/M2

## 2020-10-13 DIAGNOSIS — I82.403 DEEP VENOUS EMBOLISM AND THROMBOSIS OF BOTH LOWER EXTREMITIES (HCC): ICD-10-CM

## 2020-10-13 DIAGNOSIS — C83.18 MANTLE CELL LYMPHOMA OF LYMPH NODES OF MULTIPLE REGIONS (HCC): Primary | ICD-10-CM

## 2020-10-13 LAB
ALBUMIN SERPL-MCNC: 3.9 G/DL (ref 3.5–5)
ALBUMIN/GLOB SERPL: 1 {RATIO} (ref 1.1–2.2)
ALP SERPL-CCNC: 113 U/L (ref 45–117)
ALT SERPL-CCNC: 33 U/L (ref 12–78)
ANION GAP SERPL CALC-SCNC: 5 MMOL/L (ref 5–15)
AST SERPL-CCNC: 20 U/L (ref 15–37)
BASOPHILS # BLD: 0.1 K/UL (ref 0–0.1)
BASOPHILS NFR BLD: 1 % (ref 0–1)
BILIRUB SERPL-MCNC: 0.7 MG/DL (ref 0.2–1)
BUN SERPL-MCNC: 25 MG/DL (ref 6–20)
BUN/CREAT SERPL: 19 (ref 12–20)
CALCIUM SERPL-MCNC: 9.2 MG/DL (ref 8.5–10.1)
CHLORIDE SERPL-SCNC: 104 MMOL/L (ref 97–108)
CO2 SERPL-SCNC: 29 MMOL/L (ref 21–32)
CREAT SERPL-MCNC: 1.29 MG/DL (ref 0.7–1.3)
DIFFERENTIAL METHOD BLD: ABNORMAL
EOSINOPHIL # BLD: 0.2 K/UL (ref 0–0.4)
EOSINOPHIL NFR BLD: 3 % (ref 0–7)
ERYTHROCYTE [DISTWIDTH] IN BLOOD BY AUTOMATED COUNT: 12.8 % (ref 11.5–14.5)
GLOBULIN SER CALC-MCNC: 3.8 G/DL (ref 2–4)
GLUCOSE SERPL-MCNC: 94 MG/DL (ref 65–100)
HCT VFR BLD AUTO: 43.9 % (ref 36.6–50.3)
HGB BLD-MCNC: 13.9 G/DL (ref 12.1–17)
IMM GRANULOCYTES # BLD AUTO: 0.1 K/UL (ref 0–0.04)
IMM GRANULOCYTES NFR BLD AUTO: 1 % (ref 0–0.5)
LYMPHOCYTES # BLD: 1.7 K/UL (ref 0.8–3.5)
LYMPHOCYTES NFR BLD: 26 % (ref 12–49)
MCH RBC QN AUTO: 30.3 PG (ref 26–34)
MCHC RBC AUTO-ENTMCNC: 31.7 G/DL (ref 30–36.5)
MCV RBC AUTO: 95.6 FL (ref 80–99)
MONOCYTES # BLD: 0.7 K/UL (ref 0–1)
MONOCYTES NFR BLD: 11 % (ref 5–13)
NEUTS SEG # BLD: 3.7 K/UL (ref 1.8–8)
NEUTS SEG NFR BLD: 58 % (ref 32–75)
NRBC # BLD: 0 K/UL (ref 0–0.01)
NRBC BLD-RTO: 0 PER 100 WBC
PLATELET # BLD AUTO: 188 K/UL (ref 150–400)
PMV BLD AUTO: 10.6 FL (ref 8.9–12.9)
POTASSIUM SERPL-SCNC: 4.5 MMOL/L (ref 3.5–5.1)
PROT SERPL-MCNC: 7.7 G/DL (ref 6.4–8.2)
RBC # BLD AUTO: 4.59 M/UL (ref 4.1–5.7)
SODIUM SERPL-SCNC: 138 MMOL/L (ref 136–145)
WBC # BLD AUTO: 6.3 K/UL (ref 4.1–11.1)

## 2020-10-13 PROCEDURE — 99214 OFFICE O/P EST MOD 30 MIN: CPT | Performed by: INTERNAL MEDICINE

## 2020-10-13 PROCEDURE — 1101F PT FALLS ASSESS-DOCD LE1/YR: CPT | Performed by: INTERNAL MEDICINE

## 2020-10-13 PROCEDURE — 85025 COMPLETE CBC W/AUTO DIFF WBC: CPT

## 2020-10-13 PROCEDURE — 74011250636 HC RX REV CODE- 250/636: Performed by: NURSE PRACTITIONER

## 2020-10-13 PROCEDURE — G9717 DOC PT DX DEP/BP F/U NT REQ: HCPCS | Performed by: INTERNAL MEDICINE

## 2020-10-13 PROCEDURE — G0463 HOSPITAL OUTPT CLINIC VISIT: HCPCS | Performed by: NURSE PRACTITIONER

## 2020-10-13 PROCEDURE — G8536 NO DOC ELDER MAL SCRN: HCPCS | Performed by: INTERNAL MEDICINE

## 2020-10-13 PROCEDURE — 36415 COLL VENOUS BLD VENIPUNCTURE: CPT

## 2020-10-13 PROCEDURE — G8427 DOCREV CUR MEDS BY ELIG CLIN: HCPCS | Performed by: INTERNAL MEDICINE

## 2020-10-13 PROCEDURE — 96523 IRRIG DRUG DELIVERY DEVICE: CPT

## 2020-10-13 PROCEDURE — G8417 CALC BMI ABV UP PARAM F/U: HCPCS | Performed by: INTERNAL MEDICINE

## 2020-10-13 PROCEDURE — 80053 COMPREHEN METABOLIC PANEL: CPT

## 2020-10-13 PROCEDURE — 77030016057 HC NDL HUBR APOL -B

## 2020-10-13 RX ORDER — SODIUM CHLORIDE 0.9 % (FLUSH) 0.9 %
5-10 SYRINGE (ML) INJECTION AS NEEDED
Status: CANCELLED | OUTPATIENT
Start: 2021-01-05

## 2020-10-13 RX ORDER — SODIUM CHLORIDE 9 MG/ML
10 INJECTION INTRAMUSCULAR; INTRAVENOUS; SUBCUTANEOUS AS NEEDED
Status: ACTIVE | OUTPATIENT
Start: 2020-10-13 | End: 2020-10-13

## 2020-10-13 RX ORDER — SODIUM CHLORIDE 0.9 % (FLUSH) 0.9 %
5-10 SYRINGE (ML) INJECTION AS NEEDED
Status: DISPENSED | OUTPATIENT
Start: 2020-10-13 | End: 2020-10-13

## 2020-10-13 RX ORDER — HEPARIN 100 UNIT/ML
500 SYRINGE INTRAVENOUS AS NEEDED
Status: CANCELLED | OUTPATIENT
Start: 2021-01-05

## 2020-10-13 RX ORDER — SODIUM CHLORIDE 9 MG/ML
10 INJECTION INTRAMUSCULAR; INTRAVENOUS; SUBCUTANEOUS AS NEEDED
Status: CANCELLED | OUTPATIENT
Start: 2021-01-05

## 2020-10-13 RX ORDER — HEPARIN 100 UNIT/ML
500 SYRINGE INTRAVENOUS AS NEEDED
Status: ACTIVE | OUTPATIENT
Start: 2020-10-13 | End: 2020-10-13

## 2020-10-13 RX ADMIN — Medication 10 ML: at 10:25

## 2020-10-13 RX ADMIN — HEPARIN 500 UNITS: 100 SYRINGE at 09:10

## 2020-10-13 RX ADMIN — HEPARIN 500 UNITS: 100 SYRINGE at 10:25

## 2020-10-13 RX ADMIN — Medication 10 ML: at 09:10

## 2020-10-13 RX ADMIN — SODIUM CHLORIDE 10 ML: 9 INJECTION INTRAMUSCULAR; INTRAVENOUS; SUBCUTANEOUS at 10:24

## 2020-10-13 RX ADMIN — SODIUM CHLORIDE 10 ML: 9 INJECTION INTRAMUSCULAR; INTRAVENOUS; SUBCUTANEOUS at 09:09

## 2020-10-13 NOTE — PROGRESS NOTES
Obdulia Loaiza is a 66 y.o. male follow up for lymphoma. 1. Have you been to the ER, urgent care clinic since your last visit? Hospitalized since your last visit?no     2. Have you seen or consulted any other health care providers outside of the [address] [place] [address] since your last visit? Include any pap smears or colon screening.  no

## 2020-10-13 NOTE — PROGRESS NOTES
Outpatient [facility]fusion [facility] Short Visit Progress Note    Patient admitted to [facility] for Port flush ambulatory in stable condition. Assessment completed. No new concerns voiced. Right chest wall port accessed with positive blood return. Port flushed, heparinized, and guallpa needle de-accessed per protocol. Patient proceeded back down to [facility] as MD ordered labs for today. Port accessed with positive blood return. Labs obtained and sent for processing. Results pending in CC. Port flushed, heparinized, and guallpa needle de-accessed per protocol. Vital Signs:  Visit Vitals  BP (!) 140/93 (BP 1 Location: Right arm, BP Patient Position: Sitting)   Pulse 90   Temp 97.2 °F (36.2 °C)   Resp 18   Wt 115.8 kg (255 lb 6.4 oz)   SpO2 92%   BMI 35.62 kg/m²     Medications:  Medications Administered     0.9% sodium chloride injection 10 mL     Admin Date  10/13/2020 Action  Given Dose  10 mL Route  IntraVENous Administered By  Mikel Mejia RN          heparin (porcine) pf 500 Units     Admin Date  10/13/2020 Action  Given Dose  500 Units Route  IntraVENous Administered By  Mikel Mejia RN          sodium chloride (NS) flush 5-10 mL     Admin Date  10/13/2020 Action  Given Dose  10 mL Route  IntraVENous Administered By  Mikel Mejia, MARTHA              Patient tolerated treatment well. Patient discharged from [place] [address] ambulatory in no distress. Patient aware of next appointment. Future Appointments   Date Time Provider Bola Jara   1/5/2021  9:00 AM SS INF7 CH2 <1H [facility] Cyrus Danyel   1/5/2021  9:45 AM Lexi Sahni NP [facility] BS AMB     Patient proceeded to MD appointment.

## 2020-11-16 NOTE — PROGRESS NOTES
[facility] [place] at [facility]  [address] [facility], [address]  W: [phone]  F: [phone]      Reason for Visit:   Flavia Bishop is a 66 y.o. male who is seen for follow up of mantle cell lymphoma. Treatment History:   · CTA Chest 11/11/2016: PE in left lower lobe pulmonary arteries, extensive adneopathy  · CT A/P 11/12/2016: Cirrhosis, massive splenomegaly, ascites, massive lymphadenopathy  · US guided axillary node biopsy 11/14/2016: CD5+ B-cell non-hodgkin lymphoma, phenotype most consistent with mantle cell lymphoma. FISH with t(11;14)  · PET-CT 11/23/2016: Marked splenomegaly with increased metabolic activity consistent with lymphoma. Bilateral cervical and axillary adenopathy. Mediastinal and right hilar and left diaphragmatic adenopathy. Bilateral pelvic and inguinal adenopathy. Multiple small mesenteric and retroperitoneal nodes with low grade or no activity. · BMBx 11/29/2016: Hypercellular marrow involved by mantle cell lymphoma  · Stage IV Mantle Cell Lymphoma  · Palliative chemotherapy with Rituximab and Bendamustine x6 cycles from 12/8/2016 to 5/12/17  · Bone marrow biopsy 5/23/2017: no evidence of lymphoma  · PET/CT 6/7/2017: There has been marked improvement in the adenopathy in the cervical region, axilla, mediastinum, abdomen and pelvis which now demonstrate no increased metabolic activity. There has been marked decrease in the spleen which now demonstrates no increased metabolic activity. · CT C/A/P 7/9/2020: New enlarged bilateral axillary lymph nodes, left greater than right, and new enlarged left iliac chain and femoral lymph nodes, highly suspicious for lymphoma recurrence. · PET 6/22/1230: Hypermetabolic bilateral axillary, pelvic, and left inguinal lymph nodes. Hypermetabolic soft tissue nodules in the arms bilaterally, face, and left posterior neck.  Small but hypermetabolic left parotid nodule may represent an intraparotid lymph node. Single hypermetabolic focus in the right glenoid may represent an underlying osseous lesion. · US guided lymph biopsy of left axillary node on 8/5/2020 consistent with mantle cell lymphoma. · Bone marrow biopsy 8/5/2020 negative for involvement  · Initiated palliative therapy with Acalabrutinib 100mg BID 8/26/2020    History of Present Illness:   He continues to tolerate acalabrutinib well, no side effects that he attributes to this medication. Mild headaches in the morning, resolved quickly. No recent infections. No fevers, chills, night sweats, unintentional weight loss, adenopathy. Energy good. He is unaccompanied today. PAST HISTORY: The following sections were reviewed and updated in the EMR as appropriate: PMH, SH, FH, Medications, Allergies. No Known Allergies     Review of Systems: A complete review of systems was obtained, reviewed, and scanned into the EMR. Pertinent findings reviewed above. Physical Exam:     Visit Vitals  BP (!) 151/83 (BP 1 Location: Right arm, BP Patient Position: Sitting) Comment: . Pulse 69   Temp 98.4 °F (36.9 °C) (Temporal)   Resp 16   Ht 5' 11\" (1.803 m)   Wt 255 lb (115.7 kg)   SpO2 94%   BMI 35.57 kg/m²     ECOG PS: 0  General: No distress  Respiratory: Normal respiratory effort  CV: No peripheral edema  Skin: No rashes, ecchymoses, or petechiae  Psych: Alert, oriented, normal mood/affect      Results:     Lab Results   Component Value Date/Time    WBC 6.3 10/13/2020 10:25 AM    HGB 13.9 10/13/2020 10:25 AM    HCT 43.9 10/13/2020 10:25 AM    PLATELET 284 65/18/1913 10:25 AM    MCV 95.6 10/13/2020 10:25 AM    ABS.  NEUTROPHILS 3.7 10/13/2020 10:25 AM    Hemoglobin (POC) 10.9 (L) 01/12/2017 08:02 AM    Hematocrit (POC) 32 (L) 01/12/2017 08:02 AM     Lab Results   Component Value Date/Time    Sodium 138 10/13/2020 10:25 AM    Potassium 4.5 10/13/2020 10:25 AM    Chloride 104 10/13/2020 10:25 AM    CO2 29 10/13/2020 10:25 AM    Glucose 94 10/13/2020 10:25 AM    BUN 25 (H) 10/13/2020 10:25 AM    Creatinine 1.29 10/13/2020 10:25 AM    GFR est AA >60 10/13/2020 10:25 AM    GFR est non-AA 54 (L) 10/13/2020 10:25 AM    Calcium 9.2 10/13/2020 10:25 AM    Sodium (POC) 143 01/12/2017 08:02 AM    Potassium (POC) 4.0 01/12/2017 08:02 AM    Chloride (POC) 104 01/12/2017 08:02 AM    Glucose (POC) 156 (H) 01/12/2017 08:02 AM    BUN (POC) 23 (H) 01/12/2017 08:02 AM    Creatinine (POC) 1.0 07/09/2020 09:37 AM    Calcium, ionized (POC) 1.19 01/12/2017 08:02 AM     Lab Results   Component Value Date/Time    Bilirubin, total 0.7 10/13/2020 10:25 AM    ALT (SGPT) 33 10/13/2020 10:25 AM    Alk. phosphatase 113 10/13/2020 10:25 AM    Protein, total 7.7 10/13/2020 10:25 AM    Albumin 3.9 10/13/2020 10:25 AM    Globulin 3.8 10/13/2020 10:25 AM     Lab Results   Component Value Date/Time    Reticulocyte count 1.8 11/10/2016 02:00 PM    Iron % saturation 11 (L) 11/10/2016 02:00 PM    TIBC 240 (L) 11/10/2016 02:00 PM    Ferritin 100 11/10/2016 02:00 PM    Vitamin B12 415 11/10/2016 02:00 PM    Folate 15.6 11/10/2016 02:00 PM    Haptoglobin 186 11/11/2016 12:04 PM     10/10/2019 10:27 AM    Beta-2 Microglobulin, serum 4.4 (H) 11/15/2016 05:21 AM    TSH 4.06 (H) 11/10/2016 11:21 AM    Hep C virus Ab Interp. NONREACTIVE 06/14/2018 10:09 AM     Lab Results   Component Value Date/Time    INR 1.0 06/14/2018 10:09 AM    aPTT 31.6 06/14/2018 10:09 AM    Fibrinogen 215 11/11/2016 12:04 PM       HepB/C negative  TTE 11/10/2016: EF 60%    Bone marrow biopsy 11/29/2016: Hypercellular marrow involved by mantle cell lymphoma    Bone marrow biopsy 5/23/17: no evidence of lymphoma    US abdomen 5/29/2018:  1. Increased hepatic echogenicity compatible with diffuse hepatocellular  process, most commonly seen in steatosis. 2. Areas of compromised evaluation due to extensive bowel gas as above with  nonvisualization of gallbladder and common bile duct. CT C/A/P 8/6/2018:  No lymphadenopathy in the chest, abdomen or pelvis. CT C/A/P 1/18/2019: No lymphadenopathy in the chest, abdomen, or pelvis. No significant change. No acute process. CT C/A/P 7/11/2019:  No evidence of lymphadenopathy. Pleural-based nodule left upper lobe unchanged. Cholelithiasis    CT C/A/P 7/9/2020:   1. New enlarged bilateral axillary lymph nodes, left greater than right, and new  enlarged left iliac chain and femoral lymph nodes, highly suspicious for  lymphoma recurrence. 2. Stable splenomegaly. 3. Hepatic steatosis. 4. Cholelithiasis. 5. Additional stable findings as above. CT C/A/P 11/18/2020:  Slight decrease in size of left pelvic lymph nodes. No other change from the prior exam. Relatively stable left axillary and mediastinal lymph nodes    Assessment:   1) Mantle cell lymphoma, relapsed  Stage IV  He completed chemotherapy with R-Bendamustine in 5/2017 and then declined consolidative transplant or maintenance rituximab. Unfortunately, he now has relapsed disease confirmed with biopsy of left axillary node. He is currently on second line therapy with acalabrutinib beginning 8/2020. He is tolerating therapy well with manageable toxicity. Repeat CT demonstrates a slight response to therapy. We will proceed with treatment and monitor. I will see him every 6 weeks on therapy for now, with labs. Consider CT every 3-6 months. I have offered referral to [facility] or [facility] for a second opinion and for discussion of trial options and/or CAR-T cell therapy, but he has declined for now. 2) DVT, PE  Diagnosed 11/11/2016. Malignancy associated. Previously on lovenox. Currently, on apixaban BID. I have recommended long-term anticoagulation. 3) Thrombocytopenia  Intermittent, mild, resolved on last several checks. Possibly from splenomegaly/cirrhosis. Monitor. 4) HTN  Persistently elevated when here. He reports better values at home. Follow up with PCP. 5) Port care  Continue port flushes every 6 weeks.  He prefers to have labs through port.      Plan:     · Continue Acalabrutinib 100mg BID  · Continue apixiban 5mg PO BID  · Port flush every 6 weeks with labs  · Return to see me with labs in 6 weeks      Signed By: Lina Wong MD

## 2020-11-18 ENCOUNTER — HOSPITAL ENCOUNTER (OUTPATIENT)
Dept: CT IMAGING | Age: 75
Discharge: HOME | End: 2020-11-18
Attending: NURSE PRACTITIONER
Payer: MEDICARE

## 2020-11-18 DIAGNOSIS — C83.18 MANTLE CELL LYMPHOMA OF LYMPH NODES OF MULTIPLE REGIONS (HCC): ICD-10-CM

## 2020-11-18 PROCEDURE — 74177 CT ABD & PELVIS W/CONTRAST: CPT

## 2020-11-18 PROCEDURE — 74011000636 HC RX REV CODE- 636: Performed by: RADIOLOGY

## 2020-11-18 RX ORDER — HEPARIN 100 UNIT/ML
500 SYRINGE INTRAVENOUS
Status: ACTIVE | OUTPATIENT
Start: 2020-11-18 | End: 2020-11-18

## 2020-11-18 RX ORDER — HEPARIN 100 UNIT/ML
SYRINGE INTRAVENOUS
Status: DISPENSED
Start: 2020-11-18 | End: 2020-11-18

## 2020-11-18 RX ADMIN — IOPAMIDOL 100 ML: 755 INJECTION, SOLUTION INTRAVENOUS at 08:28

## 2020-11-25 ENCOUNTER — OFFICE VISIT (OUTPATIENT)
Dept: ONCOLOGY | Age: 75
End: 2020-11-25
Payer: MEDICARE

## 2020-11-25 VITALS
TEMPERATURE: 98.4 F | BODY MASS INDEX: 35.7 KG/M2 | DIASTOLIC BLOOD PRESSURE: 83 MMHG | RESPIRATION RATE: 16 BRPM | HEIGHT: 71 IN | WEIGHT: 255 LBS | HEART RATE: 69 BPM | SYSTOLIC BLOOD PRESSURE: 151 MMHG | OXYGEN SATURATION: 94 %

## 2020-11-25 DIAGNOSIS — C83.18 MANTLE CELL LYMPHOMA OF LYMPH NODES OF MULTIPLE REGIONS (HCC): ICD-10-CM

## 2020-11-25 DIAGNOSIS — C83.18 MANTLE CELL LYMPHOMA OF LYMPH NODES OF MULTIPLE REGIONS (HCC): Primary | ICD-10-CM

## 2020-11-25 LAB
ALBUMIN SERPL-MCNC: 3.8 G/DL (ref 3.5–5)
ALBUMIN/GLOB SERPL: 1.2 {RATIO} (ref 1.1–2.2)
ALP SERPL-CCNC: 94 U/L (ref 45–117)
ALT SERPL-CCNC: 32 U/L (ref 12–78)
ANION GAP SERPL CALC-SCNC: 4 MMOL/L (ref 5–15)
AST SERPL-CCNC: 18 U/L (ref 15–37)
BASOPHILS # BLD: 0 K/UL (ref 0–0.1)
BASOPHILS NFR BLD: 1 % (ref 0–1)
BILIRUB SERPL-MCNC: 0.6 MG/DL (ref 0.2–1)
BUN SERPL-MCNC: 26 MG/DL (ref 6–20)
BUN/CREAT SERPL: 22 (ref 12–20)
CALCIUM SERPL-MCNC: 8.9 MG/DL (ref 8.5–10.1)
CHLORIDE SERPL-SCNC: 106 MMOL/L (ref 97–108)
CO2 SERPL-SCNC: 31 MMOL/L (ref 21–32)
CREAT SERPL-MCNC: 1.2 MG/DL (ref 0.7–1.3)
DIFFERENTIAL METHOD BLD: ABNORMAL
EOSINOPHIL # BLD: 0.1 K/UL (ref 0–0.4)
EOSINOPHIL NFR BLD: 2 % (ref 0–7)
ERYTHROCYTE [DISTWIDTH] IN BLOOD BY AUTOMATED COUNT: 12.7 % (ref 11.5–14.5)
GLOBULIN SER CALC-MCNC: 3.2 G/DL (ref 2–4)
GLUCOSE SERPL-MCNC: 95 MG/DL (ref 65–100)
HCT VFR BLD AUTO: 43.9 % (ref 36.6–50.3)
HGB BLD-MCNC: 13.4 G/DL (ref 12.1–17)
IMM GRANULOCYTES # BLD AUTO: 0 K/UL (ref 0–0.04)
IMM GRANULOCYTES NFR BLD AUTO: 1 % (ref 0–0.5)
LYMPHOCYTES # BLD: 1.6 K/UL (ref 0.8–3.5)
LYMPHOCYTES NFR BLD: 26 % (ref 12–49)
MCH RBC QN AUTO: 30 PG (ref 26–34)
MCHC RBC AUTO-ENTMCNC: 30.5 G/DL (ref 30–36.5)
MCV RBC AUTO: 98.2 FL (ref 80–99)
MONOCYTES # BLD: 0.8 K/UL (ref 0–1)
MONOCYTES NFR BLD: 12 % (ref 5–13)
NEUTS SEG # BLD: 3.6 K/UL (ref 1.8–8)
NEUTS SEG NFR BLD: 58 % (ref 32–75)
NRBC # BLD: 0 K/UL (ref 0–0.01)
NRBC BLD-RTO: 0 PER 100 WBC
PLATELET # BLD AUTO: 185 K/UL (ref 150–400)
PMV BLD AUTO: 10.9 FL (ref 8.9–12.9)
POTASSIUM SERPL-SCNC: 5 MMOL/L (ref 3.5–5.1)
PROT SERPL-MCNC: 7 G/DL (ref 6.4–8.2)
RBC # BLD AUTO: 4.47 M/UL (ref 4.1–5.7)
SODIUM SERPL-SCNC: 141 MMOL/L (ref 136–145)
WBC # BLD AUTO: 6.1 K/UL (ref 4.1–11.1)

## 2020-11-25 PROCEDURE — 99214 OFFICE O/P EST MOD 30 MIN: CPT | Performed by: INTERNAL MEDICINE

## 2020-11-25 PROCEDURE — G8417 CALC BMI ABV UP PARAM F/U: HCPCS | Performed by: INTERNAL MEDICINE

## 2020-11-25 PROCEDURE — 1101F PT FALLS ASSESS-DOCD LE1/YR: CPT | Performed by: INTERNAL MEDICINE

## 2020-11-25 PROCEDURE — G8427 DOCREV CUR MEDS BY ELIG CLIN: HCPCS | Performed by: INTERNAL MEDICINE

## 2020-11-25 PROCEDURE — G0463 HOSPITAL OUTPT CLINIC VISIT: HCPCS | Performed by: INTERNAL MEDICINE

## 2020-11-25 PROCEDURE — G9717 DOC PT DX DEP/BP F/U NT REQ: HCPCS | Performed by: INTERNAL MEDICINE

## 2020-11-25 PROCEDURE — G8536 NO DOC ELDER MAL SCRN: HCPCS | Performed by: INTERNAL MEDICINE

## 2020-11-25 RX ORDER — SODIUM CHLORIDE 9 MG/ML
10 INJECTION INTRAMUSCULAR; INTRAVENOUS; SUBCUTANEOUS AS NEEDED
Status: CANCELLED | OUTPATIENT
Start: 2021-01-01

## 2020-11-25 RX ORDER — HEPARIN 100 UNIT/ML
500 SYRINGE INTRAVENOUS AS NEEDED
Status: CANCELLED | OUTPATIENT
Start: 2021-01-01

## 2020-11-25 RX ORDER — SODIUM CHLORIDE 0.9 % (FLUSH) 0.9 %
5-10 SYRINGE (ML) INJECTION AS NEEDED
Status: CANCELLED | OUTPATIENT
Start: 2021-02-16

## 2020-11-25 RX ORDER — SODIUM CHLORIDE 9 MG/ML
10 INJECTION INTRAMUSCULAR; INTRAVENOUS; SUBCUTANEOUS AS NEEDED
Status: CANCELLED | OUTPATIENT
Start: 2021-02-16

## 2020-11-25 RX ORDER — HEPARIN 100 UNIT/ML
500 SYRINGE INTRAVENOUS AS NEEDED
Status: CANCELLED | OUTPATIENT
Start: 2021-02-16

## 2020-11-25 RX ORDER — SODIUM CHLORIDE 0.9 % (FLUSH) 0.9 %
5-10 SYRINGE (ML) INJECTION AS NEEDED
Status: CANCELLED | OUTPATIENT
Start: 2021-01-01

## 2020-11-25 NOTE — PROGRESS NOTES
Aleyda Curtis is a 66 y.o. male follow up for mantle cell lymphoma. 1. Have you been to the ER, urgent care clinic since your last visit? Hospitalized since your last visit?no    2. Have you seen or consulted any other health care providers outside of the [address] [place] [address] since your last visit? Include any pap smears or colon screening.  no

## 2020-12-08 DIAGNOSIS — C83.18 MANTLE CELL LYMPHOMA OF LYMPH NODES OF MULTIPLE REGIONS (HCC): ICD-10-CM

## 2020-12-08 RX ORDER — ACALABRUTINIB 100 MG/1
100 CAPSULE, GELATIN COATED ORAL 2 TIMES DAILY
Qty: 60 CAP | Refills: 3 | Status: SHIPPED | OUTPATIENT
Start: 2020-12-08 | End: 2021-01-01

## 2020-12-22 NOTE — TELEPHONE ENCOUNTER
Discharge Summary/Instructions after an Endoscopic Procedure  Patient Name: Ashlie Redman  Patient MRN: [id number]  Patient YOB: 1957  Tuesday, December 22, 2020  Praneeth Leblanc MD  RESTRICTIONS:  During your procedure today, you received medications for sedation.  These   medications may affect your judgment, balance and coordination.  Therefore,   for 24 hours, you have the following restrictions:   - DO NOT drive a car, operate machinery, make legal/financial decisions,   sign important papers or drink alcohol.    ACTIVITY:  Today: no heavy lifting, straining or running due to procedural   sedation/anesthesia.  The following day: return to full activity including work.  DIET:  Eat and drink normally unless instructed otherwise.     TREATMENT FOR COMMON SIDE EFFECTS:  - Mild abdominal pain, nausea, belching, bloating or excessive gas:  rest,   eat lightly and use a heating pad.  - Sore Throat: treat with throat lozenges and/or gargle with warm salt   water.  - Because air was used during the procedure, expelling large amounts of air   from your rectum or belching is normal.  - If a bowel prep was taken, you may not have a bowel movement for 1-3 days.    This is normal.  SYMPTOMS TO WATCH FOR AND REPORT TO YOUR PHYSICIAN:  1. Abdominal pain or bloating, other than gas cramps.  2. Chest pain.  3. Back pain.  4. Signs of infection such as: chills or fever occurring within 24 hours   after the procedure.  5. Rectal bleeding, which would show as bright red, maroon, or black stools.   (A tablespoon of blood from the rectum is not serious, especially if   hemorrhoids are present.)  6. Vomiting.  7. Weakness or dizziness.  GO DIRECTLY TO THE NEAREST EMERGENCY ROOM IF YOU HAVE ANY OF THE FOLLOWING:      Difficulty breathing              Chills and/or fever over 101 F   Persistent vomiting and/or vomiting blood   Severe abdominal pain   Severe chest pain   Black, tarry stools   Bleeding- more than one  Patient called and stated that he had received a voicemail from Susy Santos but that it was accidentally deleted before he could retrieve the rest of the contents. He requested a call back on his cell phone to repeat the message.  #[phone] tablespoon   Any other symptom or condition that you feel may need urgent attention  Your doctor recommends these additional instructions:  If any biopsies were taken, your doctors clinic will contact you in 1 to 2   weeks with any results.  Your physician has recommended a repeat colonoscopy in five years for   surveillance.   You are being discharged to home.  For questions, problems or results please call your physician - Praneeth Leblanc MD at Work:  [phone].  EMERGENCY PHONE NUMBER: [phone], LAB RESULTS: [phone]  IF A COMPLICATION OR EMERGENCY SITUATION ARISES AND YOU ARE UNABLE TO REACH   YOUR PHYSICIAN - GO DIRECTLY TO THE EMERGENCY ROOM.  ___________________________________________  Nurse Signature  ___________________________________________  Patient/Designated Responsible Party Signature  Praneeth Leblanc MD  12/22/2020 9:06:11 AM  This report has been verified and signed electronically.  [employer]

## 2021-01-01 ENCOUNTER — TELEPHONE (OUTPATIENT)
Dept: ONCOLOGY | Age: 76
End: 2021-01-01

## 2021-01-01 ENCOUNTER — OFFICE VISIT (OUTPATIENT)
Dept: ONCOLOGY | Age: 76
End: 2021-01-01
Payer: MEDICARE

## 2021-01-01 ENCOUNTER — APPOINTMENT (OUTPATIENT)
Dept: INFUSION THERAPY | Age: 76
End: 2021-01-01

## 2021-01-01 ENCOUNTER — HOSPITAL ENCOUNTER (OUTPATIENT)
Dept: INFUSION THERAPY | Age: 76
Discharge: HOME | End: 2021-03-30
Payer: MEDICARE

## 2021-01-01 ENCOUNTER — HOSPITAL ENCOUNTER (OUTPATIENT)
Dept: INFUSION THERAPY | Age: 76
Discharge: HOME | End: 2021-07-02
Payer: MEDICARE

## 2021-01-01 ENCOUNTER — HOSPITAL ENCOUNTER (OUTPATIENT)
Dept: INFUSION THERAPY | Age: 76
Discharge: HOME | End: 2021-11-09
Payer: MEDICARE

## 2021-01-01 ENCOUNTER — HOSPITAL ENCOUNTER (OUTPATIENT)
Dept: INFUSION THERAPY | Age: 76
Discharge: HOME | End: 2021-09-28
Payer: MEDICARE

## 2021-01-01 ENCOUNTER — HOSPITAL ENCOUNTER (OUTPATIENT)
Dept: INFUSION THERAPY | Age: 76
Discharge: HOME | End: 2021-05-14
Payer: MEDICARE

## 2021-01-01 ENCOUNTER — HOSPITAL ENCOUNTER (OUTPATIENT)
Dept: INFUSION THERAPY | Age: 76
Discharge: HOME | End: 2021-08-17
Payer: MEDICARE

## 2021-01-01 ENCOUNTER — HOSPITAL ENCOUNTER (OUTPATIENT)
Dept: INFUSION THERAPY | Age: 76
Discharge: HOME | End: 2021-12-30
Payer: MEDICARE

## 2021-01-01 ENCOUNTER — HOSPITAL ENCOUNTER (OUTPATIENT)
Dept: CT IMAGING | Age: 76
Discharge: HOME | End: 2021-08-09
Attending: INTERNAL MEDICINE
Payer: MEDICARE

## 2021-01-01 ENCOUNTER — HOSPITAL ENCOUNTER (OUTPATIENT)
Dept: CT IMAGING | Age: 76
Discharge: HOME | End: 2021-04-21
Attending: NURSE PRACTITIONER
Payer: MEDICARE

## 2021-01-01 VITALS
DIASTOLIC BLOOD PRESSURE: 70 MMHG | HEART RATE: 73 BPM | TEMPERATURE: 97.9 F | SYSTOLIC BLOOD PRESSURE: 145 MMHG | RESPIRATION RATE: 18 BRPM

## 2021-01-01 VITALS
OXYGEN SATURATION: 90 % | HEART RATE: 69 BPM | DIASTOLIC BLOOD PRESSURE: 73 MMHG | SYSTOLIC BLOOD PRESSURE: 155 MMHG | RESPIRATION RATE: 18 BRPM | TEMPERATURE: 96.8 F

## 2021-01-01 VITALS
HEIGHT: 71 IN | RESPIRATION RATE: 18 BRPM | OXYGEN SATURATION: 93 % | WEIGHT: 263.5 LBS | HEART RATE: 78 BPM | SYSTOLIC BLOOD PRESSURE: 143 MMHG | BODY MASS INDEX: 36.89 KG/M2 | TEMPERATURE: 97.9 F | DIASTOLIC BLOOD PRESSURE: 76 MMHG

## 2021-01-01 VITALS
TEMPERATURE: 98.1 F | SYSTOLIC BLOOD PRESSURE: 131 MMHG | HEART RATE: 63 BPM | OXYGEN SATURATION: 93 % | HEART RATE: 75 BPM | DIASTOLIC BLOOD PRESSURE: 79 MMHG | SYSTOLIC BLOOD PRESSURE: 141 MMHG | OXYGEN SATURATION: 91 % | RESPIRATION RATE: 20 BRPM | RESPIRATION RATE: 18 BRPM | TEMPERATURE: 98.2 F | DIASTOLIC BLOOD PRESSURE: 67 MMHG

## 2021-01-01 VITALS
HEIGHT: 71 IN | HEART RATE: 71 BPM | DIASTOLIC BLOOD PRESSURE: 75 MMHG | OXYGEN SATURATION: 93 % | TEMPERATURE: 97.1 F | RESPIRATION RATE: 18 BRPM | WEIGHT: 260.1 LBS | SYSTOLIC BLOOD PRESSURE: 145 MMHG | BODY MASS INDEX: 36.41 KG/M2

## 2021-01-01 VITALS
HEIGHT: 71 IN | DIASTOLIC BLOOD PRESSURE: 67 MMHG | TEMPERATURE: 98.2 F | WEIGHT: 262 LBS | HEIGHT: 71 IN | WEIGHT: 261 LBS | RESPIRATION RATE: 18 BRPM | BODY MASS INDEX: 36.68 KG/M2 | OXYGEN SATURATION: 93 % | TEMPERATURE: 98.1 F | HEART RATE: 63 BPM | HEART RATE: 75 BPM | BODY MASS INDEX: 36.54 KG/M2 | OXYGEN SATURATION: 91 % | DIASTOLIC BLOOD PRESSURE: 79 MMHG | SYSTOLIC BLOOD PRESSURE: 141 MMHG | RESPIRATION RATE: 20 BRPM | SYSTOLIC BLOOD PRESSURE: 131 MMHG

## 2021-01-01 VITALS
TEMPERATURE: 97.1 F | HEIGHT: 71 IN | RESPIRATION RATE: 18 BRPM | DIASTOLIC BLOOD PRESSURE: 75 MMHG | BODY MASS INDEX: 36.4 KG/M2 | WEIGHT: 260 LBS | SYSTOLIC BLOOD PRESSURE: 145 MMHG | OXYGEN SATURATION: 93 % | HEART RATE: 71 BPM

## 2021-01-01 VITALS
BODY MASS INDEX: 36.93 KG/M2 | TEMPERATURE: 98.3 F | OXYGEN SATURATION: 93 % | RESPIRATION RATE: 18 BRPM | HEART RATE: 82 BPM | WEIGHT: 263.8 LBS | SYSTOLIC BLOOD PRESSURE: 146 MMHG | HEIGHT: 71 IN | DIASTOLIC BLOOD PRESSURE: 87 MMHG

## 2021-01-01 VITALS
BODY MASS INDEX: 36.26 KG/M2 | HEART RATE: 82 BPM | TEMPERATURE: 97 F | DIASTOLIC BLOOD PRESSURE: 74 MMHG | SYSTOLIC BLOOD PRESSURE: 134 MMHG | HEIGHT: 71 IN | OXYGEN SATURATION: 94 % | RESPIRATION RATE: 18 BRPM | WEIGHT: 259 LBS

## 2021-01-01 VITALS
BODY MASS INDEX: 36.4 KG/M2 | RESPIRATION RATE: 18 BRPM | DIASTOLIC BLOOD PRESSURE: 70 MMHG | HEART RATE: 73 BPM | SYSTOLIC BLOOD PRESSURE: 145 MMHG | HEIGHT: 71 IN

## 2021-01-01 VITALS
DIASTOLIC BLOOD PRESSURE: 74 MMHG | BODY MASS INDEX: 36.27 KG/M2 | RESPIRATION RATE: 18 BRPM | TEMPERATURE: 97 F | SYSTOLIC BLOOD PRESSURE: 134 MMHG | HEART RATE: 82 BPM | OXYGEN SATURATION: 94 % | WEIGHT: 259.1 LBS | HEIGHT: 71 IN

## 2021-01-01 VITALS
RESPIRATION RATE: 18 BRPM | OXYGEN SATURATION: 90 % | BODY MASS INDEX: 36.12 KG/M2 | TEMPERATURE: 96.8 F | WEIGHT: 258 LBS | HEART RATE: 69 BPM | HEIGHT: 71 IN | DIASTOLIC BLOOD PRESSURE: 73 MMHG | SYSTOLIC BLOOD PRESSURE: 155 MMHG

## 2021-01-01 DIAGNOSIS — I26.99 OTHER ACUTE PULMONARY EMBOLISM WITHOUT ACUTE COR PULMONALE (HCC): ICD-10-CM

## 2021-01-01 DIAGNOSIS — I82.403: ICD-10-CM

## 2021-01-01 DIAGNOSIS — C83.18 MANTLE CELL LYMPHOMA OF LYMPH NODES OF MULTIPLE REGIONS (HCC): ICD-10-CM

## 2021-01-01 DIAGNOSIS — C83.18 MANTLE CELL LYMPHOMA OF LYMPH NODES OF MULTIPLE REGIONS (HCC): Primary | ICD-10-CM

## 2021-01-01 DIAGNOSIS — D69.6 THROMBOCYTOPENIA, UNSPECIFIED (HCC): ICD-10-CM

## 2021-01-01 LAB
ALBUMIN SERPL-MCNC: 3.3 G/DL (ref 3.5–5)
ALBUMIN SERPL-MCNC: 3.4 G/DL (ref 3.5–5)
ALBUMIN SERPL-MCNC: 3.6 G/DL (ref 3.5–5)
ALBUMIN/GLOB SERPL: 0.9 {RATIO} (ref 1.1–2.2)
ALBUMIN/GLOB SERPL: 1 {RATIO} (ref 1.1–2.2)
ALP SERPL-CCNC: 83 U/L (ref 45–117)
ALP SERPL-CCNC: 90 U/L (ref 45–117)
ALP SERPL-CCNC: 90 U/L (ref 45–117)
ALP SERPL-CCNC: 92 U/L (ref 45–117)
ALP SERPL-CCNC: 92 U/L (ref 45–117)
ALP SERPL-CCNC: 95 U/L (ref 45–117)
ALP SERPL-CCNC: 98 U/L (ref 45–117)
ALT SERPL-CCNC: 29 U/L (ref 12–78)
ALT SERPL-CCNC: 31 U/L (ref 12–78)
ALT SERPL-CCNC: 33 U/L (ref 12–78)
ALT SERPL-CCNC: 33 U/L (ref 12–78)
ALT SERPL-CCNC: 36 U/L (ref 12–78)
ALT SERPL-CCNC: 38 U/L (ref 12–78)
ALT SERPL-CCNC: 40 U/L (ref 12–78)
ANION GAP SERPL CALC-SCNC: 1 MMOL/L (ref 5–15)
ANION GAP SERPL CALC-SCNC: 1 MMOL/L (ref 5–15)
ANION GAP SERPL CALC-SCNC: 3 MMOL/L (ref 5–15)
ANION GAP SERPL CALC-SCNC: 4 MMOL/L (ref 5–15)
ANION GAP SERPL CALC-SCNC: 4 MMOL/L (ref 5–15)
ANION GAP SERPL CALC-SCNC: 5 MMOL/L (ref 5–15)
ANION GAP SERPL CALC-SCNC: 6 MMOL/L (ref 5–15)
AST SERPL-CCNC: 17 U/L (ref 15–37)
AST SERPL-CCNC: 18 U/L (ref 15–37)
AST SERPL-CCNC: 19 U/L (ref 15–37)
AST SERPL-CCNC: 20 U/L (ref 15–37)
AST SERPL-CCNC: 21 U/L (ref 15–37)
AST SERPL-CCNC: 23 U/L (ref 15–37)
AST SERPL-CCNC: 26 U/L (ref 15–37)
BASO+EOS+MONOS # BLD AUTO: 0.7 K/UL (ref 0.2–1.2)
BASO+EOS+MONOS NFR BLD AUTO: 12 % (ref 3.2–16.9)
BASOPHILS # BLD: 0 K/UL (ref 0–0.1)
BASOPHILS NFR BLD: 0 % (ref 0–1)
BASOPHILS NFR BLD: 0 % (ref 0–1)
BASOPHILS NFR BLD: 1 % (ref 0–1)
BILIRUB SERPL-MCNC: 0.5 MG/DL (ref 0.2–1)
BILIRUB SERPL-MCNC: 0.6 MG/DL (ref 0.2–1)
BILIRUB SERPL-MCNC: 0.7 MG/DL (ref 0.2–1)
BILIRUB SERPL-MCNC: 0.8 MG/DL (ref 0.2–1)
BILIRUB SERPL-MCNC: 0.8 MG/DL (ref 0.2–1)
BILIRUB SERPL-MCNC: 0.9 MG/DL (ref 0.2–1)
BILIRUB SERPL-MCNC: 0.9 MG/DL (ref 0.2–1)
BUN SERPL-MCNC: 14 MG/DL (ref 6–20)
BUN SERPL-MCNC: 19 MG/DL (ref 6–20)
BUN SERPL-MCNC: 20 MG/DL (ref 6–20)
BUN SERPL-MCNC: 22 MG/DL (ref 6–20)
BUN SERPL-MCNC: 24 MG/DL (ref 6–20)
BUN SERPL-MCNC: 26 MG/DL (ref 6–20)
BUN SERPL-MCNC: 26 MG/DL (ref 6–20)
BUN/CREAT SERPL: 16 (ref 12–20)
BUN/CREAT SERPL: 18 (ref 12–20)
BUN/CREAT SERPL: 20 (ref 12–20)
BUN/CREAT SERPL: 21 (ref 12–20)
BUN/CREAT SERPL: 22 (ref 12–20)
CALCIUM SERPL-MCNC: 7.9 MG/DL (ref 8.5–10.1)
CALCIUM SERPL-MCNC: 8.1 MG/DL (ref 8.5–10.1)
CALCIUM SERPL-MCNC: 8.7 MG/DL (ref 8.5–10.1)
CALCIUM SERPL-MCNC: 8.7 MG/DL (ref 8.5–10.1)
CALCIUM SERPL-MCNC: 8.8 MG/DL (ref 8.5–10.1)
CALCIUM SERPL-MCNC: 8.9 MG/DL (ref 8.5–10.1)
CALCIUM SERPL-MCNC: 8.9 MG/DL (ref 8.5–10.1)
CHLORIDE SERPL-SCNC: 104 MMOL/L (ref 97–108)
CHLORIDE SERPL-SCNC: 106 MMOL/L (ref 97–108)
CHLORIDE SERPL-SCNC: 107 MMOL/L (ref 97–108)
CHLORIDE SERPL-SCNC: 109 MMOL/L (ref 97–108)
CO2 SERPL-SCNC: 27 MMOL/L (ref 21–32)
CO2 SERPL-SCNC: 28 MMOL/L (ref 21–32)
CO2 SERPL-SCNC: 28 MMOL/L (ref 21–32)
CO2 SERPL-SCNC: 29 MMOL/L (ref 21–32)
CO2 SERPL-SCNC: 30 MMOL/L (ref 21–32)
CO2 SERPL-SCNC: 31 MMOL/L (ref 21–32)
CO2 SERPL-SCNC: 31 MMOL/L (ref 21–32)
CREAT SERPL-MCNC: 0.88 MG/DL (ref 0.7–1.3)
CREAT SERPL-MCNC: 1.05 MG/DL (ref 0.7–1.3)
CREAT SERPL-MCNC: 1.07 MG/DL (ref 0.7–1.3)
CREAT SERPL-MCNC: 1.14 MG/DL (ref 0.7–1.3)
CREAT SERPL-MCNC: 1.16 MG/DL (ref 0.7–1.3)
CREAT SERPL-MCNC: 1.31 MG/DL (ref 0.7–1.3)
CREAT SERPL-MCNC: 1.33 MG/DL (ref 0.7–1.3)
DIFFERENTIAL METHOD BLD: ABNORMAL
DIFFERENTIAL METHOD BLD: NORMAL
EOSINOPHIL # BLD: 0.1 K/UL (ref 0–0.4)
EOSINOPHIL # BLD: 0.2 K/UL (ref 0–0.4)
EOSINOPHIL NFR BLD: 2 % (ref 0–7)
EOSINOPHIL NFR BLD: 3 % (ref 0–7)
ERYTHROCYTE [DISTWIDTH] IN BLOOD BY AUTOMATED COUNT: 12.4 % (ref 11.5–14.5)
ERYTHROCYTE [DISTWIDTH] IN BLOOD BY AUTOMATED COUNT: 13.1 % (ref 11.5–14.5)
ERYTHROCYTE [DISTWIDTH] IN BLOOD BY AUTOMATED COUNT: 13.5 % (ref 11.5–14.5)
ERYTHROCYTE [DISTWIDTH] IN BLOOD BY AUTOMATED COUNT: 13.7 % (ref 11.8–15.8)
ERYTHROCYTE [DISTWIDTH] IN BLOOD BY AUTOMATED COUNT: 14.1 % (ref 11.5–14.5)
ERYTHROCYTE [DISTWIDTH] IN BLOOD BY AUTOMATED COUNT: 14.7 % (ref 11.5–14.5)
ERYTHROCYTE [DISTWIDTH] IN BLOOD BY AUTOMATED COUNT: 17.6 % (ref 11.5–14.5)
GLOBULIN SER CALC-MCNC: 3.3 G/DL (ref 2–4)
GLOBULIN SER CALC-MCNC: 3.4 G/DL (ref 2–4)
GLOBULIN SER CALC-MCNC: 3.5 G/DL (ref 2–4)
GLOBULIN SER CALC-MCNC: 3.6 G/DL (ref 2–4)
GLOBULIN SER CALC-MCNC: 3.6 G/DL (ref 2–4)
GLUCOSE SERPL-MCNC: 100 MG/DL (ref 65–100)
GLUCOSE SERPL-MCNC: 109 MG/DL (ref 65–100)
GLUCOSE SERPL-MCNC: 113 MG/DL (ref 65–100)
GLUCOSE SERPL-MCNC: 135 MG/DL (ref 65–100)
GLUCOSE SERPL-MCNC: 176 MG/DL (ref 65–100)
GLUCOSE SERPL-MCNC: 205 MG/DL (ref 65–100)
GLUCOSE SERPL-MCNC: 97 MG/DL (ref 65–100)
HCT VFR BLD AUTO: 32.9 % (ref 36.6–50.3)
HCT VFR BLD AUTO: 35.1 % (ref 36.6–50.3)
HCT VFR BLD AUTO: 38 % (ref 36.6–50.3)
HCT VFR BLD AUTO: 40.9 % (ref 36.6–50.3)
HCT VFR BLD AUTO: 41.7 % (ref 36.6–50.3)
HCT VFR BLD AUTO: 42.8 % (ref 36.6–50.3)
HCT VFR BLD AUTO: 43.4 % (ref 36.6–50.3)
HGB BLD-MCNC: 10.4 G/DL (ref 12.1–17)
HGB BLD-MCNC: 11.3 G/DL (ref 12.1–17)
HGB BLD-MCNC: 12.2 G/DL (ref 12.1–17)
HGB BLD-MCNC: 12.6 G/DL (ref 12.1–17)
HGB BLD-MCNC: 12.9 G/DL (ref 12.1–17)
HGB BLD-MCNC: 13 G/DL (ref 12.1–17)
HGB BLD-MCNC: 14.1 G/DL (ref 12.1–17)
IMM GRANULOCYTES # BLD AUTO: 0 K/UL (ref 0–0.04)
IMM GRANULOCYTES # BLD AUTO: 0.1 K/UL (ref 0–0.04)
IMM GRANULOCYTES # BLD AUTO: 0.1 K/UL (ref 0–0.04)
IMM GRANULOCYTES NFR BLD AUTO: 0 % (ref 0–0.5)
IMM GRANULOCYTES NFR BLD AUTO: 1 % (ref 0–0.5)
LYMPHOCYTES # BLD: 0.9 K/UL (ref 0.8–3.5)
LYMPHOCYTES # BLD: 1.1 K/UL (ref 0.8–3.5)
LYMPHOCYTES # BLD: 1.2 K/UL (ref 0.8–3.5)
LYMPHOCYTES # BLD: 1.4 K/UL (ref 0.8–3.5)
LYMPHOCYTES # BLD: 1.5 K/UL (ref 0.8–3.5)
LYMPHOCYTES NFR BLD: 21 % (ref 12–49)
LYMPHOCYTES NFR BLD: 22 % (ref 12–49)
LYMPHOCYTES NFR BLD: 24 % (ref 12–49)
LYMPHOCYTES NFR BLD: 24 % (ref 12–49)
LYMPHOCYTES NFR BLD: 26 % (ref 12–49)
LYMPHOCYTES NFR BLD: 28 % (ref 12–49)
LYMPHOCYTES NFR BLD: 29 % (ref 12–49)
MCH RBC QN AUTO: 29.3 PG (ref 26–34)
MCH RBC QN AUTO: 29.4 PG (ref 26–34)
MCH RBC QN AUTO: 30.1 PG (ref 26–34)
MCH RBC QN AUTO: 30.5 PG (ref 26–34)
MCH RBC QN AUTO: 30.7 PG (ref 26–34)
MCH RBC QN AUTO: 30.8 PG (ref 26–34)
MCH RBC QN AUTO: 31.4 PG (ref 26–34)
MCHC RBC AUTO-ENTMCNC: 30.2 G/DL (ref 30–36.5)
MCHC RBC AUTO-ENTMCNC: 30.4 G/DL (ref 30–36.5)
MCHC RBC AUTO-ENTMCNC: 31.5 G/DL (ref 30–36.5)
MCHC RBC AUTO-ENTMCNC: 31.6 G/DL (ref 30–36.5)
MCHC RBC AUTO-ENTMCNC: 32.1 G/DL (ref 30–36.5)
MCHC RBC AUTO-ENTMCNC: 32.2 G/DL (ref 30–36.5)
MCHC RBC AUTO-ENTMCNC: 32.5 G/DL (ref 30–36.5)
MCV RBC AUTO: 93.9 FL (ref 80–99)
MCV RBC AUTO: 95.6 FL (ref 80–99)
MCV RBC AUTO: 96 FL (ref 80–99)
MCV RBC AUTO: 96.4 FL (ref 80–99)
MCV RBC AUTO: 97.1 FL (ref 80–99)
MCV RBC AUTO: 97.2 FL (ref 80–99)
MCV RBC AUTO: 97.5 FL (ref 80–99)
METAMYELOCYTES NFR BLD MANUAL: 1 %
MONOCYTES # BLD: 0.4 K/UL (ref 0–1)
MONOCYTES # BLD: 0.5 K/UL (ref 0–1)
MONOCYTES # BLD: 0.6 K/UL (ref 0–1)
MONOCYTES # BLD: 0.6 K/UL (ref 0–1)
MONOCYTES # BLD: 0.7 K/UL (ref 0–1)
MONOCYTES # BLD: 0.7 K/UL (ref 0–1)
MONOCYTES NFR BLD: 10 % (ref 5–13)
MONOCYTES NFR BLD: 12 % (ref 5–13)
MONOCYTES NFR BLD: 13 % (ref 5–13)
MONOCYTES NFR BLD: 13 % (ref 5–13)
MONOCYTES NFR BLD: 16 % (ref 5–13)
MONOCYTES NFR BLD: 7 % (ref 5–13)
MYELOCYTES NFR BLD MANUAL: 4 %
NEUTS BAND NFR BLD MANUAL: 2 %
NEUTS SEG # BLD: 2.5 K/UL (ref 1.8–8)
NEUTS SEG # BLD: 2.7 K/UL (ref 1.8–8)
NEUTS SEG # BLD: 3 K/UL (ref 1.8–8)
NEUTS SEG # BLD: 3 K/UL (ref 1.8–8)
NEUTS SEG # BLD: 3.2 K/UL (ref 1.8–8)
NEUTS SEG # BLD: 3.2 K/UL (ref 1.8–8)
NEUTS SEG # BLD: 4 K/UL (ref 1.8–8)
NEUTS SEG NFR BLD: 55 % (ref 32–75)
NEUTS SEG NFR BLD: 56 % (ref 32–75)
NEUTS SEG NFR BLD: 57 % (ref 32–75)
NEUTS SEG NFR BLD: 58 % (ref 32–75)
NEUTS SEG NFR BLD: 60 % (ref 32–75)
NEUTS SEG NFR BLD: 64 % (ref 32–75)
NEUTS SEG NFR BLD: 65 % (ref 32–75)
NRBC # BLD: 0 K/UL (ref 0–0.01)
NRBC # BLD: 0.02 K/UL (ref 0–0.01)
NRBC # BLD: 0.03 K/UL (ref 0–0.01)
NRBC # BLD: 0.09 K/UL (ref 0–0.01)
NRBC BLD-RTO: 0 PER 100 WBC
NRBC BLD-RTO: 0.4 PER 100 WBC
NRBC BLD-RTO: 0.6 PER 100 WBC
NRBC BLD-RTO: 1.7 PER 100 WBC
PLATELET # BLD AUTO: 125 K/UL (ref 150–400)
PLATELET # BLD AUTO: 127 K/UL (ref 150–400)
PLATELET # BLD AUTO: 173 K/UL (ref 150–400)
PLATELET # BLD AUTO: 179 K/UL (ref 150–400)
PLATELET # BLD AUTO: 56 K/UL (ref 150–400)
PLATELET # BLD AUTO: 73 K/UL (ref 150–400)
PLATELET # BLD AUTO: 95 K/UL (ref 150–400)
PLATELET COMMENTS,PCOM: ABNORMAL
PLATELET COMMENTS,PCOM: ABNORMAL
PMV BLD AUTO: 10.4 FL (ref 8.9–12.9)
PMV BLD AUTO: 10.7 FL (ref 8.9–12.9)
PMV BLD AUTO: 10.8 FL (ref 8.9–12.9)
PMV BLD AUTO: 11 FL (ref 8.9–12.9)
PMV BLD AUTO: 11.7 FL (ref 8.9–12.9)
PMV BLD AUTO: ABNORMAL FL (ref 8.9–12.9)
POTASSIUM SERPL-SCNC: 3.9 MMOL/L (ref 3.5–5.1)
POTASSIUM SERPL-SCNC: 4.2 MMOL/L (ref 3.5–5.1)
POTASSIUM SERPL-SCNC: 4.3 MMOL/L (ref 3.5–5.1)
POTASSIUM SERPL-SCNC: 4.3 MMOL/L (ref 3.5–5.1)
POTASSIUM SERPL-SCNC: 4.4 MMOL/L (ref 3.5–5.1)
PROT SERPL-MCNC: 6.7 G/DL (ref 6.4–8.2)
PROT SERPL-MCNC: 6.7 G/DL (ref 6.4–8.2)
PROT SERPL-MCNC: 6.8 G/DL (ref 6.4–8.2)
PROT SERPL-MCNC: 6.8 G/DL (ref 6.4–8.2)
PROT SERPL-MCNC: 6.9 G/DL (ref 6.4–8.2)
PROT SERPL-MCNC: 7 G/DL (ref 6.4–8.2)
PROT SERPL-MCNC: 7.2 G/DL (ref 6.4–8.2)
RBC # BLD AUTO: 3.39 M/UL (ref 4.1–5.7)
RBC # BLD AUTO: 3.6 M/UL (ref 4.1–5.7)
RBC # BLD AUTO: 3.96 M/UL (ref 4.1–5.7)
RBC # BLD AUTO: 4.28 M/UL (ref 4.1–5.7)
RBC # BLD AUTO: 4.29 M/UL (ref 4.1–5.7)
RBC # BLD AUTO: 4.44 M/UL (ref 4.1–5.7)
RBC # BLD AUTO: 4.62 M/UL (ref 4.1–5.7)
RBC MORPH BLD: ABNORMAL
RBC MORPH BLD: ABNORMAL
SODIUM SERPL-SCNC: 138 MMOL/L (ref 136–145)
SODIUM SERPL-SCNC: 138 MMOL/L (ref 136–145)
SODIUM SERPL-SCNC: 139 MMOL/L (ref 136–145)
SODIUM SERPL-SCNC: 140 MMOL/L (ref 136–145)
WBC # BLD AUTO: 4.3 K/UL (ref 4.1–11.1)
WBC # BLD AUTO: 4.9 K/UL (ref 4.1–11.1)
WBC # BLD AUTO: 4.9 K/UL (ref 4.1–11.1)
WBC # BLD AUTO: 5 K/UL (ref 4.1–11.1)
WBC # BLD AUTO: 5.3 K/UL (ref 4.1–11.1)
WBC # BLD AUTO: 5.5 K/UL (ref 4.1–11.1)
WBC # BLD AUTO: 6.1 K/UL (ref 4.1–11.1)

## 2021-01-01 PROCEDURE — G8427 DOCREV CUR MEDS BY ELIG CLIN: HCPCS | Performed by: INTERNAL MEDICINE

## 2021-01-01 PROCEDURE — G9717 DOC PT DX DEP/BP F/U NT REQ: HCPCS | Performed by: INTERNAL MEDICINE

## 2021-01-01 PROCEDURE — G0463 HOSPITAL OUTPT CLINIC VISIT: HCPCS | Performed by: NURSE PRACTITIONER

## 2021-01-01 PROCEDURE — 1101F PT FALLS ASSESS-DOCD LE1/YR: CPT | Performed by: INTERNAL MEDICINE

## 2021-01-01 PROCEDURE — 74011250636 HC RX REV CODE- 250/636: Performed by: NURSE PRACTITIONER

## 2021-01-01 PROCEDURE — 74011250636 HC RX REV CODE- 250/636: Performed by: INTERNAL MEDICINE

## 2021-01-01 PROCEDURE — G0463 HOSPITAL OUTPT CLINIC VISIT: HCPCS | Performed by: INTERNAL MEDICINE

## 2021-01-01 PROCEDURE — 99215 OFFICE O/P EST HI 40 MIN: CPT | Performed by: INTERNAL MEDICINE

## 2021-01-01 PROCEDURE — 74011000636 HC RX REV CODE- 636: Performed by: INTERNAL MEDICINE

## 2021-01-01 PROCEDURE — G8427 DOCREV CUR MEDS BY ELIG CLIN: HCPCS | Performed by: NURSE PRACTITIONER

## 2021-01-01 PROCEDURE — 85025 COMPLETE CBC W/AUTO DIFF WBC: CPT

## 2021-01-01 PROCEDURE — G9717 DOC PT DX DEP/BP F/U NT REQ: HCPCS | Performed by: NURSE PRACTITIONER

## 2021-01-01 PROCEDURE — G8536 NO DOC ELDER MAL SCRN: HCPCS | Performed by: INTERNAL MEDICINE

## 2021-01-01 PROCEDURE — 99215 OFFICE O/P EST HI 40 MIN: CPT | Performed by: NURSE PRACTITIONER

## 2021-01-01 PROCEDURE — 80053 COMPREHEN METABOLIC PANEL: CPT

## 2021-01-01 PROCEDURE — 74011000636 HC RX REV CODE- 636: Performed by: RADIOLOGY

## 2021-01-01 PROCEDURE — 36591 DRAW BLOOD OFF VENOUS DEVICE: CPT

## 2021-01-01 PROCEDURE — 1101F PT FALLS ASSESS-DOCD LE1/YR: CPT | Performed by: NURSE PRACTITIONER

## 2021-01-01 PROCEDURE — G8536 NO DOC ELDER MAL SCRN: HCPCS | Performed by: NURSE PRACTITIONER

## 2021-01-01 PROCEDURE — 77030016057 HC NDL HUBR APOL -B

## 2021-01-01 PROCEDURE — G8417 CALC BMI ABV UP PARAM F/U: HCPCS | Performed by: INTERNAL MEDICINE

## 2021-01-01 PROCEDURE — 36415 COLL VENOUS BLD VENIPUNCTURE: CPT

## 2021-01-01 PROCEDURE — 77030012965 HC NDL HUBR BBMI -A

## 2021-01-01 PROCEDURE — G8417 CALC BMI ABV UP PARAM F/U: HCPCS | Performed by: NURSE PRACTITIONER

## 2021-01-01 PROCEDURE — 74177 CT ABD & PELVIS W/CONTRAST: CPT

## 2021-01-01 PROCEDURE — 96523 IRRIG DRUG DELIVERY DEVICE: CPT

## 2021-01-01 PROCEDURE — 74011000250 HC RX REV CODE- 250: Performed by: INTERNAL MEDICINE

## 2021-01-01 RX ORDER — SODIUM CHLORIDE 9 MG/ML
10 INJECTION INTRAMUSCULAR; INTRAVENOUS; SUBCUTANEOUS AS NEEDED
Status: CANCELLED | OUTPATIENT
Start: 2021-01-01

## 2021-01-01 RX ORDER — SODIUM CHLORIDE 9 MG/ML
10 INJECTION INTRAMUSCULAR; INTRAVENOUS; SUBCUTANEOUS AS NEEDED
Status: ACTIVE | OUTPATIENT
Start: 2021-01-01 | End: 2021-01-01

## 2021-01-01 RX ORDER — HEPARIN 100 UNIT/ML
500 SYRINGE INTRAVENOUS AS NEEDED
Status: ACTIVE | OUTPATIENT
Start: 2021-01-01 | End: 2021-01-01

## 2021-01-01 RX ORDER — SODIUM CHLORIDE 0.9 % (FLUSH) 0.9 %
5-10 SYRINGE (ML) INJECTION AS NEEDED
Status: DISPENSED | OUTPATIENT
Start: 2021-01-01 | End: 2021-01-01

## 2021-01-01 RX ORDER — SODIUM CHLORIDE 0.9 % (FLUSH) 0.9 %
5-10 SYRINGE (ML) INJECTION AS NEEDED
Status: CANCELLED | OUTPATIENT
Start: 2022-01-01

## 2021-01-01 RX ORDER — ACALABRUTINIB 100 MG/1
100 CAPSULE, GELATIN COATED ORAL EVERY 12 HOURS
Qty: 60 CAPSULE | Refills: 3 | Status: SHIPPED | OUTPATIENT
Start: 2021-01-01

## 2021-01-01 RX ORDER — OFLOXACIN 3 MG/ML
SOLUTION/ DROPS OPHTHALMIC
COMMUNITY
Start: 2021-01-01 | End: 2022-01-01

## 2021-01-01 RX ORDER — HEPARIN 100 UNIT/ML
500 SYRINGE INTRAVENOUS AS NEEDED
Status: CANCELLED | OUTPATIENT
Start: 2021-01-01

## 2021-01-01 RX ORDER — PREDNISOLONE ACETATE 10 MG/ML
SUSPENSION/ DROPS OPHTHALMIC
COMMUNITY
Start: 2021-01-01 | End: 2022-01-01

## 2021-01-01 RX ORDER — HEPARIN 100 UNIT/ML
500 SYRINGE INTRAVENOUS
Status: COMPLETED | OUTPATIENT
Start: 2021-01-01 | End: 2021-01-01

## 2021-01-01 RX ORDER — HEPARIN 100 UNIT/ML
500 SYRINGE INTRAVENOUS
Status: DISPENSED | OUTPATIENT
Start: 2021-01-01 | End: 2021-01-01

## 2021-01-01 RX ORDER — HEPARIN 100 UNIT/ML
SYRINGE INTRAVENOUS
Status: DISPENSED
Start: 2021-01-01 | End: 2021-01-01

## 2021-01-01 RX ORDER — ACALABRUTINIB 100 MG/1
100 CAPSULE, GELATIN COATED ORAL EVERY 12 HOURS
Qty: 60 CAPSULE | Refills: 3 | Status: SHIPPED | OUTPATIENT
Start: 2021-01-01 | End: 2021-01-01 | Stop reason: SDUPTHER

## 2021-01-01 RX ORDER — SODIUM CHLORIDE 9 MG/ML
10 INJECTION INTRAMUSCULAR; INTRAVENOUS; SUBCUTANEOUS AS NEEDED
Status: CANCELLED | OUTPATIENT
Start: 2022-01-01

## 2021-01-01 RX ORDER — SODIUM CHLORIDE 0.9 % (FLUSH) 0.9 %
5-10 SYRINGE (ML) INJECTION AS NEEDED
Status: CANCELLED | OUTPATIENT
Start: 2021-01-01

## 2021-01-01 RX ORDER — HEPARIN 100 UNIT/ML
500 SYRINGE INTRAVENOUS AS NEEDED
Status: CANCELLED | OUTPATIENT
Start: 2022-01-01

## 2021-01-01 RX ORDER — KETOROLAC TROMETHAMINE 5 MG/ML
SOLUTION OPHTHALMIC
COMMUNITY
Start: 2021-01-01 | End: 2022-01-01

## 2021-01-01 RX ADMIN — HEPARIN 500 UNITS: 100 SYRINGE at 09:00

## 2021-01-01 RX ADMIN — SODIUM CHLORIDE 10 ML: 9 INJECTION INTRAMUSCULAR; INTRAVENOUS; SUBCUTANEOUS at 11:04

## 2021-01-01 RX ADMIN — SODIUM CHLORIDE 10 ML: 9 INJECTION INTRAMUSCULAR; INTRAVENOUS; SUBCUTANEOUS at 10:35

## 2021-01-01 RX ADMIN — Medication 500 UNITS: at 11:04

## 2021-01-01 RX ADMIN — HEPARIN 500 UNITS: 100 SYRINGE at 11:33

## 2021-01-01 RX ADMIN — Medication 10 ML: at 10:35

## 2021-01-01 RX ADMIN — Medication 10 ML: at 09:45

## 2021-01-01 RX ADMIN — SODIUM CHLORIDE 10 ML: 9 INJECTION, SOLUTION INTRAMUSCULAR; INTRAVENOUS; SUBCUTANEOUS at 09:45

## 2021-01-01 RX ADMIN — SODIUM CHLORIDE 10 ML: 9 INJECTION INTRAMUSCULAR; INTRAVENOUS; SUBCUTANEOUS at 09:00

## 2021-01-01 RX ADMIN — Medication 500 UNITS: at 09:50

## 2021-01-01 RX ADMIN — IOPAMIDOL 100 ML: 755 INJECTION, SOLUTION INTRAVENOUS at 08:46

## 2021-01-01 RX ADMIN — IOPAMIDOL 100 ML: 755 INJECTION, SOLUTION INTRAVENOUS at 11:30

## 2021-01-01 RX ADMIN — HEPARIN 500 UNITS: 100 SYRINGE at 10:35

## 2021-01-01 RX ADMIN — Medication 10 ML: at 09:00

## 2021-01-04 NOTE — PROGRESS NOTES
[facility] [place] at [address], [address] [facility] [address] [facility]  W: [phone]  F: [phone]      Reason for Visit:   Arianna Infante is a 66 y.o. male who is seen for follow up of mantle cell lymphoma. Treatment History:   · CTA Chest 11/11/2016: PE in left lower lobe pulmonary arteries, extensive adneopathy  · CT A/P 11/12/2016: Cirrhosis, massive splenomegaly, ascites, massive lymphadenopathy  · US guided axillary node biopsy 11/14/2016: CD5+ B-cell non-hodgkin lymphoma, phenotype most consistent with mantle cell lymphoma. FISH with t(11;14)  · PET-CT 11/23/2016: Marked splenomegaly with increased metabolic activity consistent with lymphoma. Bilateral cervical and axillary adenopathy. Mediastinal and right hilar and left diaphragmatic adenopathy. Bilateral pelvic and inguinal adenopathy. Multiple small mesenteric and retroperitoneal nodes with low grade or no activity. · BMBx 11/29/2016: Hypercellular marrow involved by mantle cell lymphoma  · Stage IV Mantle Cell Lymphoma  · Palliative chemotherapy with Rituximab and Bendamustine x6 cycles from 12/8/2016 to 5/12/17  · Bone marrow biopsy 5/23/2017: no evidence of lymphoma  · PET/CT 6/7/2017: There has been marked improvement in the adenopathy in the cervical region, axilla, mediastinum, abdomen and pelvis which now demonstrate no increased metabolic activity. There has been marked decrease in the spleen which now demonstrates no increased metabolic activity. · CT C/A/P 7/9/2020: New enlarged bilateral axillary lymph nodes, left greater than right, and new enlarged left iliac chain and femoral lymph nodes, highly suspicious for lymphoma recurrence. · PET 6/50/1156: Hypermetabolic bilateral axillary, pelvic, and left inguinal lymph nodes. Hypermetabolic soft tissue nodules in the arms bilaterally, face, and left posterior neck.  Small but hypermetabolic left parotid nodule may represent an intraparotid lymph node. Single hypermetabolic focus in the right glenoid may represent an underlying osseous lesion. · US guided lymph biopsy of left axillary node on 8/5/2020 consistent with mantle cell lymphoma. · Bone marrow biopsy 8/5/2020 negative for involvement  · Initiated palliative therapy with Acalabrutinib 100mg BID 8/26/2020    History of Present Illness:   He continues to tolerate acalabrutinib well. Reports taking as prescribed. No issues with shipments from pharmacy. Struggles with arthritis in knees, but is overall doing well. Denies pain currently. Appetite has been stable. No nausea or vomiting. No change in bowels. Denies hot flashes, night sweats, no new lumps or bumps. He is unaccompanied today. Review of systems was obtained and pertinent findings reviewed above. Past medical history, social history, family history, medications, and allergies are located in the electronic medical record. Physical Exam:     Visit Vitals  BP (!) 147/81   Pulse 75   Temp 97.8 °F (36.6 °C) (Temporal)   Resp 18   Ht 5' 11\" (1.803 m)   Wt 258 lb (117 kg)   SpO2 94%   BMI 35.98 kg/m²     ECOG PS: 0  General: No distress  Respiratory: Normal respiratory effort  CV: No peripheral edema  Skin: No rashes, ecchymoses, or petechiae  Psych: Alert, oriented, normal mood/affect      Results:     Lab Results   Component Value Date/Time    WBC 6.3 01/05/2021 09:01 AM    HGB 13.9 01/05/2021 09:01 AM    HCT 41.9 01/05/2021 09:01 AM    PLATELET 424 99/25/4572 09:01 AM    MCV 93.7 01/05/2021 09:01 AM    ABS.  NEUTROPHILS 3.9 01/05/2021 09:01 AM    Hemoglobin (POC) 10.9 (L) 01/12/2017 08:02 AM    Hematocrit (POC) 32 (L) 01/12/2017 08:02 AM     Lab Results   Component Value Date/Time    Sodium 138 01/05/2021 09:01 AM    Potassium 4.3 01/05/2021 09:01 AM    Chloride 106 01/05/2021 09:01 AM    CO2 30 01/05/2021 09:01 AM    Glucose 105 (H) 01/05/2021 09:01 AM    BUN 25 (H) 01/05/2021 09:01 AM    Creatinine 1.28 01/05/2021 09:01 AM    GFR est AA >60 01/05/2021 09:01 AM    GFR est non-AA 55 (L) 01/05/2021 09:01 AM    Calcium 8.8 01/05/2021 09:01 AM    Sodium (POC) 143 01/12/2017 08:02 AM    Potassium (POC) 4.0 01/12/2017 08:02 AM    Chloride (POC) 104 01/12/2017 08:02 AM    Glucose (POC) 156 (H) 01/12/2017 08:02 AM    BUN (POC) 23 (H) 01/12/2017 08:02 AM    Creatinine (POC) 1.0 07/09/2020 09:37 AM    Calcium, ionized (POC) 1.19 01/12/2017 08:02 AM     Lab Results   Component Value Date/Time    Bilirubin, total 0.7 01/05/2021 09:01 AM    ALT (SGPT) 41 01/05/2021 09:01 AM    Alk. phosphatase 103 01/05/2021 09:01 AM    Protein, total 7.2 01/05/2021 09:01 AM    Albumin 3.6 01/05/2021 09:01 AM    Globulin 3.6 01/05/2021 09:01 AM     Lab Results   Component Value Date/Time    Reticulocyte count 1.8 11/10/2016 02:00 PM    Iron % saturation 11 (L) 11/10/2016 02:00 PM    TIBC 240 (L) 11/10/2016 02:00 PM    Ferritin 100 11/10/2016 02:00 PM    Vitamin B12 415 11/10/2016 02:00 PM    Folate 15.6 11/10/2016 02:00 PM    Haptoglobin 186 11/11/2016 12:04 PM     10/10/2019 10:27 AM    Beta-2 Microglobulin, serum 4.4 (H) 11/15/2016 05:21 AM    TSH 4.06 (H) 11/10/2016 11:21 AM    Hep C virus Ab Interp. NONREACTIVE 06/14/2018 10:09 AM     Lab Results   Component Value Date/Time    INR 1.0 06/14/2018 10:09 AM    aPTT 31.6 06/14/2018 10:09 AM    Fibrinogen 215 11/11/2016 12:04 PM       HepB/C negative  TTE 11/10/2016: EF 60%    Bone marrow biopsy 11/29/2016: Hypercellular marrow involved by mantle cell lymphoma    Bone marrow biopsy 5/23/17: no evidence of lymphoma    US abdomen 5/29/2018:  1. Increased hepatic echogenicity compatible with diffuse hepatocellular  process, most commonly seen in steatosis. 2. Areas of compromised evaluation due to extensive bowel gas as above with  nonvisualization of gallbladder and common bile duct. CT C/A/P 8/6/2018: No lymphadenopathy in the chest, abdomen or pelvis.     CT C/A/P 1/18/2019: No lymphadenopathy in the chest, abdomen, or pelvis. No significant change. No acute process. CT C/A/P 7/11/2019:  No evidence of lymphadenopathy. Pleural-based nodule left upper lobe unchanged. Cholelithiasis    CT C/A/P 7/9/2020:   1. New enlarged bilateral axillary lymph nodes, left greater than right, and new  enlarged left iliac chain and femoral lymph nodes, highly suspicious for  lymphoma recurrence. 2. Stable splenomegaly. 3. Hepatic steatosis. 4. Cholelithiasis. 5. Additional stable findings as above. CT C/A/P 11/18/2020:  Slight decrease in size of left pelvic lymph nodes. No other change from the prior exam. Relatively stable left axillary and mediastinal lymph nodes    Assessment:   1) Mantle cell lymphoma, relapsed  Stage IV  He completed chemotherapy with R-Bendamustine in 5/2017 and then declined consolidative transplant or maintenance rituximab. Unfortunately, he now has relapsed disease confirmed with biopsy of left axillary node. He is currently on second line therapy with acalabrutinib beginning 8/2020. He is tolerating therapy well with manageable toxicity. Labs reviewed and stable today. We will proceed with treatment. The patient will be seen every 6 weeks, and labs will be monitored to assess for toxicity from therapy. Consider CT every 3-6 months. I have offered referral to [facility] for a second opinion and for discussion of trial options and/or CAR-T cell therapy, but he has declined for now. 2) DVT, PE  Diagnosed 11/11/2016. Malignancy associated. Previously on lovenox. Currently, on apixaban BID. I have recommended long-term anticoagulation. 3) Thrombocytopenia  Intermittent, mild, resolved on last several checks. Possibly from splenomegaly/cirrhosis. Monitor. 4) HTN  Persistently elevated when here. He reports better values at home. Follow up with PCP. 5) Port care  Continue port flushes every 6 weeks. He prefers to have labs through port.      Plan:     · Continue Acalabrutinib 100mg BID  · Continue apixiban 5mg PO BID  · Port flush every 6 weeks with labs (CBC, CMP)  · Return to see me with labs in 6 weeks    I have personally seen and evaluated the patient in conjunction with Rick Kim NP. I find the patient's history and physical exam are consistent with the NP's documentation. I agree with the above assessment and plan, which I have edited if needed.     Signed By: Lori Storey MD no

## 2021-01-05 ENCOUNTER — HOSPITAL ENCOUNTER (OUTPATIENT)
Dept: INFUSION THERAPY | Age: 76
Discharge: HOME | End: 2021-01-05
Payer: MEDICARE

## 2021-01-05 ENCOUNTER — OFFICE VISIT (OUTPATIENT)
Dept: ONCOLOGY | Age: 76
End: 2021-01-05
Payer: MEDICARE

## 2021-01-05 VITALS
HEART RATE: 75 BPM | DIASTOLIC BLOOD PRESSURE: 81 MMHG | TEMPERATURE: 97.8 F | RESPIRATION RATE: 18 BRPM | SYSTOLIC BLOOD PRESSURE: 147 MMHG

## 2021-01-05 VITALS
WEIGHT: 258 LBS | HEART RATE: 75 BPM | RESPIRATION RATE: 18 BRPM | HEIGHT: 71 IN | BODY MASS INDEX: 36.12 KG/M2 | TEMPERATURE: 97.8 F | DIASTOLIC BLOOD PRESSURE: 81 MMHG | SYSTOLIC BLOOD PRESSURE: 147 MMHG | OXYGEN SATURATION: 94 %

## 2021-01-05 DIAGNOSIS — C83.18 MANTLE CELL LYMPHOMA OF LYMPH NODES OF MULTIPLE REGIONS (HCC): Primary | ICD-10-CM

## 2021-01-05 LAB
ALBUMIN SERPL-MCNC: 3.6 G/DL (ref 3.5–5)
ALBUMIN/GLOB SERPL: 1 {RATIO} (ref 1.1–2.2)
ALP SERPL-CCNC: 103 U/L (ref 45–117)
ALT SERPL-CCNC: 41 U/L (ref 12–78)
ANION GAP SERPL CALC-SCNC: 2 MMOL/L (ref 5–15)
AST SERPL-CCNC: 22 U/L (ref 15–37)
BASO+EOS+MONOS # BLD AUTO: 0.8 K/UL (ref 0.2–1.2)
BASO+EOS+MONOS NFR BLD AUTO: 13 % (ref 3.2–16.9)
BILIRUB SERPL-MCNC: 0.7 MG/DL (ref 0.2–1)
BUN SERPL-MCNC: 25 MG/DL (ref 6–20)
BUN/CREAT SERPL: 20 (ref 12–20)
CALCIUM SERPL-MCNC: 8.8 MG/DL (ref 8.5–10.1)
CHLORIDE SERPL-SCNC: 106 MMOL/L (ref 97–108)
CO2 SERPL-SCNC: 30 MMOL/L (ref 21–32)
CREAT SERPL-MCNC: 1.28 MG/DL (ref 0.7–1.3)
DIFFERENTIAL METHOD BLD: NORMAL
ERYTHROCYTE [DISTWIDTH] IN BLOOD BY AUTOMATED COUNT: 13.4 % (ref 11.8–15.8)
GLOBULIN SER CALC-MCNC: 3.6 G/DL (ref 2–4)
GLUCOSE SERPL-MCNC: 105 MG/DL (ref 65–100)
HCT VFR BLD AUTO: 41.9 % (ref 36.6–50.3)
HGB BLD-MCNC: 13.9 G/DL (ref 12.1–17)
LYMPHOCYTES # BLD: 1.6 K/UL (ref 0.8–3.5)
LYMPHOCYTES NFR BLD: 26 % (ref 12–49)
MCH RBC QN AUTO: 31.1 PG (ref 26–34)
MCHC RBC AUTO-ENTMCNC: 33.2 G/DL (ref 30–36.5)
MCV RBC AUTO: 93.7 FL (ref 80–99)
NEUTS SEG # BLD: 3.9 K/UL (ref 1.8–8)
NEUTS SEG NFR BLD: 61 % (ref 32–75)
PLATELET # BLD AUTO: 169 K/UL (ref 150–400)
POTASSIUM SERPL-SCNC: 4.3 MMOL/L (ref 3.5–5.1)
PROT SERPL-MCNC: 7.2 G/DL (ref 6.4–8.2)
RBC # BLD AUTO: 4.47 M/UL (ref 4.1–5.7)
SODIUM SERPL-SCNC: 138 MMOL/L (ref 136–145)
WBC # BLD AUTO: 6.3 K/UL (ref 4.1–11.1)

## 2021-01-05 PROCEDURE — G8417 CALC BMI ABV UP PARAM F/U: HCPCS | Performed by: INTERNAL MEDICINE

## 2021-01-05 PROCEDURE — G8427 DOCREV CUR MEDS BY ELIG CLIN: HCPCS | Performed by: INTERNAL MEDICINE

## 2021-01-05 PROCEDURE — 80053 COMPREHEN METABOLIC PANEL: CPT

## 2021-01-05 PROCEDURE — G9717 DOC PT DX DEP/BP F/U NT REQ: HCPCS | Performed by: INTERNAL MEDICINE

## 2021-01-05 PROCEDURE — 1101F PT FALLS ASSESS-DOCD LE1/YR: CPT | Performed by: INTERNAL MEDICINE

## 2021-01-05 PROCEDURE — 85025 COMPLETE CBC W/AUTO DIFF WBC: CPT

## 2021-01-05 PROCEDURE — 77030016057 HC NDL HUBR APOL -B

## 2021-01-05 PROCEDURE — 36591 DRAW BLOOD OFF VENOUS DEVICE: CPT

## 2021-01-05 PROCEDURE — G0463 HOSPITAL OUTPT CLINIC VISIT: HCPCS | Performed by: NURSE PRACTITIONER

## 2021-01-05 PROCEDURE — 74011250636 HC RX REV CODE- 250/636: Performed by: NURSE PRACTITIONER

## 2021-01-05 PROCEDURE — G8536 NO DOC ELDER MAL SCRN: HCPCS | Performed by: INTERNAL MEDICINE

## 2021-01-05 PROCEDURE — 99215 OFFICE O/P EST HI 40 MIN: CPT | Performed by: INTERNAL MEDICINE

## 2021-01-05 RX ORDER — HEPARIN 100 UNIT/ML
500 SYRINGE INTRAVENOUS AS NEEDED
Status: ACTIVE | OUTPATIENT
Start: 2021-01-05 | End: 2021-01-05

## 2021-01-05 RX ORDER — SODIUM CHLORIDE 9 MG/ML
10 INJECTION INTRAMUSCULAR; INTRAVENOUS; SUBCUTANEOUS AS NEEDED
Status: ACTIVE | OUTPATIENT
Start: 2021-01-05 | End: 2021-01-05

## 2021-01-05 RX ORDER — SODIUM CHLORIDE 0.9 % (FLUSH) 0.9 %
5-10 SYRINGE (ML) INJECTION AS NEEDED
Status: DISPENSED | OUTPATIENT
Start: 2021-01-05 | End: 2021-01-05

## 2021-01-05 RX ADMIN — Medication 10 ML: at 09:08

## 2021-01-05 RX ADMIN — Medication 500 UNITS: at 09:07

## 2021-01-05 NOTE — PROGRESS NOTES
[facility] Progress Note    Date: 2021    Name: Santos Luna    MRN: [id number]         : 1945    Monthly Port flush     Mr. Marta Garcia was assessed and education was provided. Mr. Lg Gannon vitals were reviewed  Visit Vitals  BP (!) 147/81 (BP 1 Location: Right arm, BP Patient Position: Sitting)   Pulse 75   Temp 97.8 °F (36.6 °C)   Resp 18       Mediport was accessed with 20G, 0.75inch barrera(s) after chloroprep. R  chest, single    Blood return: yes     blood collected for labs per protocol. Flushed NS followed by Heparin 500u    Barrera needle(s) removed. Band-Aid applied. Mr. Marta Garcia tolerated the procedure, and had no complaints. Mr. Marta Garcia was discharged from [place] [address] in stable condition. He is to go to [facility] and receive his next appointment.     Nino Astorga  2021  9:15 AM

## 2021-01-05 NOTE — PROGRESS NOTES
Emmett Mirelesari is a 66 y.o. male follow up for mantle cell lymphoma.     Vitals 1/5/2021   Blood Pressure 147/81   Pulse 75   Temp 97.8   Resp 18   Height

## 2021-02-12 NOTE — PROGRESS NOTES
[facility] [place] at [address], [address] [facility] [address] [facility]  W: [phone]  F: [phone]      Reason for Visit:   Alvaro Patel is a 66 y.o. male who is seen for follow up of mantle cell lymphoma. Treatment History:   · CTA Chest 11/11/2016: PE in left lower lobe pulmonary arteries, extensive adneopathy  · CT A/P 11/12/2016: Cirrhosis, massive splenomegaly, ascites, massive lymphadenopathy  · US guided axillary node biopsy 11/14/2016: CD5+ B-cell non-hodgkin lymphoma, phenotype most consistent with mantle cell lymphoma. FISH with t(11;14)  · PET-CT 11/23/2016: Marked splenomegaly with increased metabolic activity consistent with lymphoma. Bilateral cervical and axillary adenopathy. Mediastinal and right hilar and left diaphragmatic adenopathy. Bilateral pelvic and inguinal adenopathy. Multiple small mesenteric and retroperitoneal nodes with low grade or no activity. · BMBx 11/29/2016: Hypercellular marrow involved by mantle cell lymphoma  · Stage IV Mantle Cell Lymphoma  · Palliative chemotherapy with Rituximab and Bendamustine x6 cycles from 12/8/2016 to 5/12/17  · Bone marrow biopsy 5/23/2017: no evidence of lymphoma  · PET/CT 6/7/2017: There has been marked improvement in the adenopathy in the cervical region, axilla, mediastinum, abdomen and pelvis which now demonstrate no increased metabolic activity. There has been marked decrease in the spleen which now demonstrates no increased metabolic activity. · CT C/A/P 7/9/2020: New enlarged bilateral axillary lymph nodes, left greater than right, and new enlarged left iliac chain and femoral lymph nodes, highly suspicious for lymphoma recurrence. · PET 7/68/0555: Hypermetabolic bilateral axillary, pelvic, and left inguinal lymph nodes. Hypermetabolic soft tissue nodules in the arms bilaterally, face, and left posterior neck.  Small but hypermetabolic left parotid nodule may represent an intraparotid lymph node. Single hypermetabolic focus in the right glenoid may represent an underlying osseous lesion. · US guided lymph biopsy of left axillary node on 8/5/2020 consistent with mantle cell lymphoma. · Bone marrow biopsy 8/5/2020 negative for involvement  · Initiated palliative therapy with Acalabrutinib 100mg BID 8/26/2020    History of Present Illness:   Reports compliance with Acalabrutinib. \"I feel as good as can be expected with what is going on in the world\". No symptoms to report. Denies nausea, vomiting, itching or rashes. Denies diarrhea. He is unaccompanied today. Review of systems was obtained and pertinent findings reviewed above. Past medical history, social history, family history, medications, and allergies are located in the electronic medical record. Physical Exam:     Visit Vitals  BP (!) 143/75   Pulse 73   Temp 97.8 °F (36.6 °C)   Resp 18   Ht 5' 11\" (1.803 m)   Wt 256 lb (116.1 kg)   SpO2 92%   BMI 35.70 kg/m²     ECOG PS: 0  General: No distress  Respiratory: Normal respiratory effort  CV: No peripheral edema  Skin: No rashes, ecchymoses, or petechiae  Psych: Alert, oriented, normal mood/affect      Results:     Lab Results   Component Value Date/Time    WBC 5.5 02/16/2021 09:23 AM    HGB 13.8 02/16/2021 09:23 AM    HCT 43.2 02/16/2021 09:23 AM    PLATELET 196 27/29/5894 09:23 AM    MCV 93.5 02/16/2021 09:23 AM    ABS.  NEUTROPHILS 3.6 02/16/2021 09:23 AM    Hemoglobin (POC) 10.9 (L) 01/12/2017 08:02 AM    Hematocrit (POC) 32 (L) 01/12/2017 08:02 AM     Lab Results   Component Value Date/Time    Sodium 138 02/16/2021 09:23 AM    Potassium 4.0 02/16/2021 09:23 AM    Chloride 107 02/16/2021 09:23 AM    CO2 29 02/16/2021 09:23 AM    Glucose 99 02/16/2021 09:23 AM    BUN 18 02/16/2021 09:23 AM    Creatinine 1.22 02/16/2021 09:23 AM    GFR est AA >60 02/16/2021 09:23 AM    GFR est non-AA 58 (L) 02/16/2021 09:23 AM    Calcium 8.3 (L) 02/16/2021 09:23 AM    Sodium (POC) 143 01/12/2017 08:02 AM    Potassium (POC) 4.0 01/12/2017 08:02 AM    Chloride (POC) 104 01/12/2017 08:02 AM    Glucose (POC) 156 (H) 01/12/2017 08:02 AM    BUN (POC) 23 (H) 01/12/2017 08:02 AM    Creatinine (POC) 1.0 07/09/2020 09:37 AM    Calcium, ionized (POC) 1.19 01/12/2017 08:02 AM     Lab Results   Component Value Date/Time    Bilirubin, total 0.6 02/16/2021 09:23 AM    ALT (SGPT) 34 02/16/2021 09:23 AM    Alk. phosphatase 104 02/16/2021 09:23 AM    Protein, total 7.0 02/16/2021 09:23 AM    Albumin 3.4 (L) 02/16/2021 09:23 AM    Globulin 3.6 02/16/2021 09:23 AM     Lab Results   Component Value Date/Time    Reticulocyte count 1.8 11/10/2016 02:00 PM    Iron % saturation 11 (L) 11/10/2016 02:00 PM    TIBC 240 (L) 11/10/2016 02:00 PM    Ferritin 100 11/10/2016 02:00 PM    Vitamin B12 415 11/10/2016 02:00 PM    Folate 15.6 11/10/2016 02:00 PM    Haptoglobin 186 11/11/2016 12:04 PM     10/10/2019 10:27 AM    Beta-2 Microglobulin, serum 4.4 (H) 11/15/2016 05:21 AM    TSH 4.06 (H) 11/10/2016 11:21 AM    Hep C virus Ab Interp. NONREACTIVE 06/14/2018 10:09 AM     Lab Results   Component Value Date/Time    INR 1.0 06/14/2018 10:09 AM    aPTT 31.6 06/14/2018 10:09 AM    Fibrinogen 215 11/11/2016 12:04 PM       HepB/C negative  TTE 11/10/2016: EF 60%    Bone marrow biopsy 11/29/2016: Hypercellular marrow involved by mantle cell lymphoma    Bone marrow biopsy 5/23/17: no evidence of lymphoma    US abdomen 5/29/2018:  1. Increased hepatic echogenicity compatible with diffuse hepatocellular  process, most commonly seen in steatosis. 2. Areas of compromised evaluation due to extensive bowel gas as above with  nonvisualization of gallbladder and common bile duct. CT C/A/P 8/6/2018: No lymphadenopathy in the chest, abdomen or pelvis. CT C/A/P 1/18/2019: No lymphadenopathy in the chest, abdomen, or pelvis. No significant change. No acute process. CT C/A/P 7/11/2019:  No evidence of lymphadenopathy.  Pleural-based nodule left upper lobe unchanged. Cholelithiasis    CT C/A/P 7/9/2020:   1. New enlarged bilateral axillary lymph nodes, left greater than right, and new  enlarged left iliac chain and femoral lymph nodes, highly suspicious for  lymphoma recurrence. 2. Stable splenomegaly. 3. Hepatic steatosis. 4. Cholelithiasis. 5. Additional stable findings as above. CT C/A/P 11/18/2020:  Slight decrease in size of left pelvic lymph nodes. No other change from the prior exam. Relatively stable left axillary and mediastinal lymph nodes    Assessment:   1) Mantle cell lymphoma, relapsed  Stage IV  He completed chemotherapy with R-Bendamustine in 5/2017 and then declined consolidative transplant or maintenance rituximab. Unfortunately, he now has relapsed disease confirmed with biopsy of left axillary node. He is currently on second line therapy with acalabrutinib beginning 8/2020. He is tolerating therapy well with manageable toxicity. Labs reviewed and stable today. We will proceed with treatment. The patient will be seen every 6 weeks, and labs will be monitored to assess for toxicity from therapy. Consider CT every 3-6 months. I have offered referral to [facility] or [facility] for a second opinion and for discussion of trial options and/or CAR-T cell therapy, but he has declined for now. 2) DVT, PE  Diagnosed 11/11/2016. Malignancy associated. Previously on lovenox. Currently, on apixaban BID. I have recommended long-term anticoagulation. 3) Thrombocytopenia  Intermittent, mild. Possibly from splenomegaly/cirrhosis. Monitor. 4) HTN  Management per PCP. 5) Port care  Continue port flushes every 6 weeks. He prefers to have labs through port. Plan:     · Continue Acalabrutinib 100mg BID  · Continue apixiban 5mg PO BID  · Port flush every 6 weeks with labs (CBC, CMP)  · Return to see me with labs in 6 weeks    I have personally seen and evaluated the patient in conjunction with Radha Fairbanks NP.  I find the patient's history and physical exam are consistent with the NP's documentation. I agree with the above assessment and plan, which I have edited if needed.     Signed By: Kirby Sharma MD

## 2021-02-16 ENCOUNTER — OFFICE VISIT (OUTPATIENT)
Dept: ONCOLOGY | Age: 76
End: 2021-02-16
Payer: MEDICARE

## 2021-02-16 ENCOUNTER — HOSPITAL ENCOUNTER (OUTPATIENT)
Dept: INFUSION THERAPY | Age: 76
Discharge: HOME | End: 2021-02-16
Payer: MEDICARE

## 2021-02-16 VITALS
TEMPERATURE: 97.8 F | RESPIRATION RATE: 18 BRPM | HEART RATE: 73 BPM | OXYGEN SATURATION: 73 % | DIASTOLIC BLOOD PRESSURE: 75 MMHG | SYSTOLIC BLOOD PRESSURE: 143 MMHG

## 2021-02-16 VITALS
BODY MASS INDEX: 35.84 KG/M2 | HEIGHT: 71 IN | RESPIRATION RATE: 18 BRPM | HEART RATE: 73 BPM | DIASTOLIC BLOOD PRESSURE: 75 MMHG | WEIGHT: 256 LBS | SYSTOLIC BLOOD PRESSURE: 143 MMHG | OXYGEN SATURATION: 92 % | TEMPERATURE: 97.8 F

## 2021-02-16 DIAGNOSIS — C83.18 MANTLE CELL LYMPHOMA OF LYMPH NODES OF MULTIPLE REGIONS (HCC): Primary | ICD-10-CM

## 2021-02-16 LAB
ALBUMIN SERPL-MCNC: 3.4 G/DL (ref 3.5–5)
ALBUMIN/GLOB SERPL: 0.9 {RATIO} (ref 1.1–2.2)
ALP SERPL-CCNC: 104 U/L (ref 45–117)
ALT SERPL-CCNC: 34 U/L (ref 12–78)
ANION GAP SERPL CALC-SCNC: 2 MMOL/L (ref 5–15)
AST SERPL-CCNC: 17 U/L (ref 15–37)
BASO+EOS+MONOS # BLD AUTO: 0.7 K/UL (ref 0.2–1.2)
BASO+EOS+MONOS NFR BLD AUTO: 13 % (ref 3.2–16.9)
BILIRUB SERPL-MCNC: 0.6 MG/DL (ref 0.2–1)
BUN SERPL-MCNC: 18 MG/DL (ref 6–20)
BUN/CREAT SERPL: 15 (ref 12–20)
CALCIUM SERPL-MCNC: 8.3 MG/DL (ref 8.5–10.1)
CHLORIDE SERPL-SCNC: 107 MMOL/L (ref 97–108)
CO2 SERPL-SCNC: 29 MMOL/L (ref 21–32)
CREAT SERPL-MCNC: 1.22 MG/DL (ref 0.7–1.3)
DIFFERENTIAL METHOD BLD: NORMAL
ERYTHROCYTE [DISTWIDTH] IN BLOOD BY AUTOMATED COUNT: 13.3 % (ref 11.8–15.8)
GLOBULIN SER CALC-MCNC: 3.6 G/DL (ref 2–4)
GLUCOSE SERPL-MCNC: 99 MG/DL (ref 65–100)
HCT VFR BLD AUTO: 43.2 % (ref 36.6–50.3)
HGB BLD-MCNC: 13.8 G/DL (ref 12.1–17)
LYMPHOCYTES # BLD: 1.2 K/UL (ref 0.8–3.5)
LYMPHOCYTES NFR BLD: 22 % (ref 12–49)
MCH RBC QN AUTO: 29.9 PG (ref 26–34)
MCHC RBC AUTO-ENTMCNC: 31.9 G/DL (ref 30–36.5)
MCV RBC AUTO: 93.5 FL (ref 80–99)
NEUTS SEG # BLD: 3.6 K/UL (ref 1.8–8)
NEUTS SEG NFR BLD: 64 % (ref 32–75)
PLATELET # BLD AUTO: 197 K/UL (ref 150–400)
POTASSIUM SERPL-SCNC: 4 MMOL/L (ref 3.5–5.1)
PROT SERPL-MCNC: 7 G/DL (ref 6.4–8.2)
RBC # BLD AUTO: 4.62 M/UL (ref 4.1–5.7)
SODIUM SERPL-SCNC: 138 MMOL/L (ref 136–145)
WBC # BLD AUTO: 5.5 K/UL (ref 4.1–11.1)

## 2021-02-16 PROCEDURE — 80053 COMPREHEN METABOLIC PANEL: CPT

## 2021-02-16 PROCEDURE — 74011250636 HC RX REV CODE- 250/636: Performed by: NURSE PRACTITIONER

## 2021-02-16 PROCEDURE — 99215 OFFICE O/P EST HI 40 MIN: CPT | Performed by: INTERNAL MEDICINE

## 2021-02-16 PROCEDURE — G9717 DOC PT DX DEP/BP F/U NT REQ: HCPCS | Performed by: INTERNAL MEDICINE

## 2021-02-16 PROCEDURE — G8417 CALC BMI ABV UP PARAM F/U: HCPCS | Performed by: INTERNAL MEDICINE

## 2021-02-16 PROCEDURE — 85025 COMPLETE CBC W/AUTO DIFF WBC: CPT

## 2021-02-16 PROCEDURE — G8536 NO DOC ELDER MAL SCRN: HCPCS | Performed by: INTERNAL MEDICINE

## 2021-02-16 PROCEDURE — G8427 DOCREV CUR MEDS BY ELIG CLIN: HCPCS | Performed by: INTERNAL MEDICINE

## 2021-02-16 PROCEDURE — G0463 HOSPITAL OUTPT CLINIC VISIT: HCPCS | Performed by: NURSE PRACTITIONER

## 2021-02-16 PROCEDURE — 1101F PT FALLS ASSESS-DOCD LE1/YR: CPT | Performed by: INTERNAL MEDICINE

## 2021-02-16 PROCEDURE — 36591 DRAW BLOOD OFF VENOUS DEVICE: CPT

## 2021-02-16 PROCEDURE — 77030016057 HC NDL HUBR APOL -B

## 2021-02-16 RX ORDER — HEPARIN 100 UNIT/ML
500 SYRINGE INTRAVENOUS AS NEEDED
Status: CANCELLED | OUTPATIENT
Start: 2021-01-01

## 2021-02-16 RX ORDER — SODIUM CHLORIDE 0.9 % (FLUSH) 0.9 %
5-10 SYRINGE (ML) INJECTION AS NEEDED
Status: DISPENSED | OUTPATIENT
Start: 2021-02-16 | End: 2021-02-16

## 2021-02-16 RX ORDER — HEPARIN 100 UNIT/ML
500 SYRINGE INTRAVENOUS AS NEEDED
Status: ACTIVE | OUTPATIENT
Start: 2021-02-16 | End: 2021-02-16

## 2021-02-16 RX ORDER — SODIUM CHLORIDE 9 MG/ML
10 INJECTION INTRAMUSCULAR; INTRAVENOUS; SUBCUTANEOUS AS NEEDED
Status: CANCELLED | OUTPATIENT
Start: 2021-01-01

## 2021-02-16 RX ORDER — SODIUM CHLORIDE 0.9 % (FLUSH) 0.9 %
5-10 SYRINGE (ML) INJECTION AS NEEDED
Status: CANCELLED | OUTPATIENT
Start: 2021-01-01

## 2021-02-16 RX ORDER — SODIUM CHLORIDE 9 MG/ML
10 INJECTION INTRAMUSCULAR; INTRAVENOUS; SUBCUTANEOUS AS NEEDED
Status: ACTIVE | OUTPATIENT
Start: 2021-02-16 | End: 2021-02-16

## 2021-02-16 RX ADMIN — Medication 500 UNITS: at 09:34

## 2021-02-16 RX ADMIN — Medication 10 ML: at 09:35

## 2021-02-16 NOTE — PROGRESS NOTES
Jennifer White is a 66 y.o. male follow up for mantle cell lymphoma. 1. Have you been to the ER, urgent care clinic since your last visit? Hospitalized since your last visit?no     2. Have you seen or consulted any other health care providers outside of the [address] [place] [address] since your last visit? Include any pap smears or colon screening.  No    Vitals 2/16/2021   Blood Pressure 143/75   Pulse 73   Temp 97.8   Resp 18   Height    Weight    SpO2

## 2021-02-16 NOTE — PROGRESS NOTES
[facility] Progress Note    Date: 2021    Name: Andie Suarez    MRN: [id number]         : 1945    Monthly Port flush       Mr. Sadia Ybarra vitals were reviewed. .  Visit Vitals  BP (!) 143/75 (BP 1 Location: Right arm, BP Patient Position: Sitting)   Pulse 73   Temp 97.8 °F (36.6 °C)   Resp 18   SpO2 (!) 73%       Mediport was accessed with 20G 0.75 inch barrera(s) after chloroprep. R  chest, single    Blood return: yes     blood collected for labs per protocol. Flushed  NS followed by Heparin 500u    Barrera needle(s) removed. Band-Aid applied. Mr. Rc Schwartz tolerated the procedure, and had no complaints. Mr. Rc Schwartz was discharged from [place] [address] in stable condition. He is to return for his next appointment that he will receive at [facility] today.     Anuradha Young  2021  9:41 AM

## 2021-03-21 NOTE — TELEPHONE ENCOUNTER
[address] at [facility]  [phone]    09/22/20- Patient inquired about getting a flu shot while taking acalabrutinib. Verified with Edilma Manriquez that it's okay. Patient verbalized understanding, no further questions or concerns. Good

## 2021-03-29 NOTE — PROGRESS NOTES
[facility] [place] at [address] [facility] [address] [facility]  W: [phone]  F: [phone]      Reason for Visit:   Neris Lim is a 66 y.o. male who is seen for follow up of mantle cell lymphoma. Treatment History:   · CTA Chest 11/11/2016: PE in left lower lobe pulmonary arteries, extensive adneopathy  · CT A/P 11/12/2016: Cirrhosis, massive splenomegaly, ascites, massive lymphadenopathy  · US guided axillary node biopsy 11/14/2016: CD5+ B-cell non-hodgkin lymphoma, phenotype most consistent with mantle cell lymphoma. FISH with t(11;14)  · PET-CT 11/23/2016: Marked splenomegaly with increased metabolic activity consistent with lymphoma. Bilateral cervical and axillary adenopathy. Mediastinal and right hilar and left diaphragmatic adenopathy. Bilateral pelvic and inguinal adenopathy. Multiple small mesenteric and retroperitoneal nodes with low grade or no activity. · BMBx 11/29/2016: Hypercellular marrow involved by mantle cell lymphoma  · Stage IV Mantle Cell Lymphoma  · Palliative chemotherapy with Rituximab and Bendamustine x6 cycles from 12/8/2016 to 5/12/17  · Bone marrow biopsy 5/23/2017: no evidence of lymphoma  · PET/CT 6/7/2017: There has been marked improvement in the adenopathy in the cervical region, axilla, mediastinum, abdomen and pelvis which now demonstrate no increased metabolic activity. There has been marked decrease in the spleen which now demonstrates no increased metabolic activity. · CT C/A/P 7/9/2020: New enlarged bilateral axillary lymph nodes, left greater than right, and new enlarged left iliac chain and femoral lymph nodes, highly suspicious for lymphoma recurrence. · PET 8/52/2994: Hypermetabolic bilateral axillary, pelvic, and left inguinal lymph nodes. Hypermetabolic soft tissue nodules in the arms bilaterally, face, and left posterior neck.  Small but hypermetabolic left parotid nodule may represent an intraparotid lymph node. Single hypermetabolic focus in the right glenoid may represent an underlying osseous lesion. · US guided lymph biopsy of left axillary node on 8/5/2020 consistent with mantle cell lymphoma. · Bone marrow biopsy 8/5/2020 negative for involvement  · Initiated palliative therapy with Acalabrutinib 100mg BID 8/26/2020    History of Present Illness:   Received both COVID vaccines since last visit. Did well with both, no new concerns. Denies pain. No nausea or vomiting. No mouth sores. No headache, dizziness. Appetite has been stable. No change in bowels. He is unaccompanied today. Review of systems was obtained and pertinent findings reviewed above. Past medical history, social history, family history, medications, and allergies are located in the electronic medical record. Physical Exam:     Visit Vitals  BP (!) 155/73   Pulse 69   Temp 96.8 °F (36 °C)   Resp 18   Ht 5' 11\" (1.803 m)   Wt 258 lb (117 kg)   SpO2 90%   BMI 35.98 kg/m²     ECOG PS: 0  General: no distress  Eyes: anicteric sclerae  HENT: oropharynx clear  Neck: supple  Lymphatic: no cervical, supraclavicular, or inguinal adenopathy  Respiratory: normal respiratory effort  CV: no peripheral edema  GI: soft, nontender, nondistended, no masses  Skin: no rashes; no ecchymoses; no petechiae      Results:     Lab Results   Component Value Date/Time    WBC 6.1 03/30/2021 08:57 AM    HGB 14.1 03/30/2021 08:57 AM    HCT 43.4 03/30/2021 08:57 AM    PLATELET 487 13/39/3622 08:57 AM    MCV 93.9 03/30/2021 08:57 AM    ABS.  NEUTROPHILS 4.0 03/30/2021 08:57 AM    Hemoglobin (POC) 10.9 (L) 01/12/2017 08:02 AM    Hematocrit (POC) 32 (L) 01/12/2017 08:02 AM     Lab Results   Component Value Date/Time    Sodium 139 03/30/2021 08:57 AM    Potassium 4.2 03/30/2021 08:57 AM    Chloride 107 03/30/2021 08:57 AM    CO2 31 03/30/2021 08:57 AM    Glucose 113 (H) 03/30/2021 08:57 AM    BUN 24 (H) 03/30/2021 08:57 AM    Creatinine 1.31 (H) 03/30/2021 08:57 AM    GFR est AA >60 03/30/2021 08:57 AM    GFR est non-AA 53 (L) 03/30/2021 08:57 AM    Calcium 8.7 03/30/2021 08:57 AM    Sodium (POC) 143 01/12/2017 08:02 AM    Potassium (POC) 4.0 01/12/2017 08:02 AM    Chloride (POC) 104 01/12/2017 08:02 AM    Glucose (POC) 156 (H) 01/12/2017 08:02 AM    BUN (POC) 23 (H) 01/12/2017 08:02 AM    Creatinine (POC) 1.0 07/09/2020 09:37 AM    Calcium, ionized (POC) 1.19 01/12/2017 08:02 AM     Lab Results   Component Value Date/Time    Bilirubin, total 0.8 03/30/2021 08:57 AM    ALT (SGPT) 36 03/30/2021 08:57 AM    Alk. phosphatase 98 03/30/2021 08:57 AM    Protein, total 7.2 03/30/2021 08:57 AM    Albumin 3.6 03/30/2021 08:57 AM    Globulin 3.6 03/30/2021 08:57 AM     Lab Results   Component Value Date/Time    Reticulocyte count 1.8 11/10/2016 02:00 PM    Iron % saturation 11 (L) 11/10/2016 02:00 PM    TIBC 240 (L) 11/10/2016 02:00 PM    Ferritin 100 11/10/2016 02:00 PM    Vitamin B12 415 11/10/2016 02:00 PM    Folate 15.6 11/10/2016 02:00 PM    Haptoglobin 186 11/11/2016 12:04 PM     10/10/2019 10:27 AM    Beta-2 Microglobulin, serum 4.4 (H) 11/15/2016 05:21 AM    TSH 4.06 (H) 11/10/2016 11:21 AM    Hep C virus Ab Interp. NONREACTIVE 06/14/2018 10:09 AM     Lab Results   Component Value Date/Time    INR 1.0 06/14/2018 10:09 AM    aPTT 31.6 06/14/2018 10:09 AM    Fibrinogen 215 11/11/2016 12:04 PM       HepB/C negative  TTE 11/10/2016: EF 60%    Bone marrow biopsy 11/29/2016: Hypercellular marrow involved by mantle cell lymphoma    Bone marrow biopsy 5/23/17: no evidence of lymphoma    US abdomen 5/29/2018:  1. Increased hepatic echogenicity compatible with diffuse hepatocellular  process, most commonly seen in steatosis. 2. Areas of compromised evaluation due to extensive bowel gas as above with  nonvisualization of gallbladder and common bile duct. CT C/A/P 8/6/2018: No lymphadenopathy in the chest, abdomen or pelvis.     CT C/A/P 1/18/2019: No lymphadenopathy in the chest, abdomen, or pelvis. No significant change. No acute process. CT C/A/P 7/11/2019:  No evidence of lymphadenopathy. Pleural-based nodule left upper lobe unchanged. Cholelithiasis    CT C/A/P 7/9/2020:   1. New enlarged bilateral axillary lymph nodes, left greater than right, and new  enlarged left iliac chain and femoral lymph nodes, highly suspicious for  lymphoma recurrence. 2. Stable splenomegaly. 3. Hepatic steatosis. 4. Cholelithiasis. 5. Additional stable findings as above. CT C/A/P 11/18/2020:  Slight decrease in size of left pelvic lymph nodes. No other change from the prior exam. Relatively stable left axillary and mediastinal lymph nodes    Assessment:   1) Mantle cell lymphoma, relapsed  Stage IV  He completed chemotherapy with R-Bendamustine in 5/2017 and then declined consolidative transplant or maintenance rituximab. Unfortunately, he now has relapsed disease confirmed with biopsy of left axillary node. He is currently on second line therapy with acalabrutinib beginning 8/2020. He is tolerating therapy well with manageable toxicity. Labs reviewed and stable today. We will proceed with treatment. The patient will be seen every 6 weeks, and labs will be monitored to assess for toxicity from therapy. Consider CT every 3-6 months, will order to be done before next visit. I have offered referral to [facility] or [facility] for a second opinion and for discussion of trial options and/or CAR-T cell therapy, but he has declined for now. 2) DVT, PE  Diagnosed 11/11/2016. Malignancy associated. Previously on lovenox. Currently, on apixaban BID. I have recommended long-term anticoagulation. 3) Thrombocytopenia  Intermittent, mild. Possibly from splenomegaly/cirrhosis. Monitor. 4) HTN  Management per PCP. 5) Port care  Continue port flushes every 6 weeks. He prefers to have labs through port.      Plan:     · Continue Acalabrutinib 100mg BID  · Continue apixiban 5mg PO BID, refilled  · Port flush every 6 weeks with labs (CBC, CMP)  · CT before next visit  · Return to see me with labs in 6 weeks    I have personally seen and evaluated the patient in conjunction with Adalberto Marrufo NP. I find the patient's history and physical exam are consistent with the NP's documentation. I agree with the above assessment and plan, which I have modified as needed. He is tolerating therapy well, feeling great on treatment. Repeat scans before his next visit to evaluate his response.     Signed By: Mariella Newell MD

## 2021-03-30 NOTE — PROGRESS NOTES
Arianna Infante is a 66 y.o. male follow up for mantle cell lymphoma. 1. Have you been to the ER, urgent care clinic since your last visit? Hospitalized since your last visit?no     2. Have you seen or consulted any other health care providers outside of the [address] [place] [address] since your last visit? Include any pap smears or colon screening.  No    Vitals 3/30/2021   Blood Pressure 155/73   Pulse 69   Temp 96.8   Resp 18   Height    Weight    SpO2 90

## 2021-03-30 NOTE — PROGRESS NOTES
[facility] Lab Visit:    Pt arrived ambulatory and in no distress, labs drawn per R PAC. Port accessed without difficulty, labs drawn and sent for processing. Port flushed, heparinized and deaccessed per protocol. Departed [facility] ambulatory and in no distress.     Visit Vitals  BP (!) 155/73   Pulse 69   Temp 96.8 °F (36 °C)   Resp 18   SpO2 90%

## 2021-03-30 NOTE — PATIENT INSTRUCTIONS
Prognosis: Intermediate This is our best current assessment. Cancers respond differently to treatment. Overall prognosis depends on many factors including other conditions, cancer stage, side effects, and other unforeseen events. Goal of therapy: Palliative Expected response to treatment:  Good: Anticipate prolonged, long-term control of cancer Treatment benefits and harms:  We discussed potential short term side effects to include:GI upset, increased infection risk, anemia, alopecia, increased risk of bleeding, fatigue  and other side effects listed in the provided reading material. 
 
Long term side effects of treatment:  secondary malignancies, bone marrow suppression, reproductive/fertility effects, neuropathy, cardiotoxicity and other side effects listed in the provided reading material. 
 
Quality of life: Quality of life concerns have been addressed. Treatment as outlined is expected to have minimal impact on patients quality of life.

## 2021-05-14 NOTE — PROGRESS NOTES
Natan Rosa is a 66 y.o. male follow up for mantle cell lymphoma. 1. Have you been to the ER, urgent care clinic since your last visit? Hospitalized since your last visit?no     2. Have you seen or consulted any other health care providers outside of the [address] [place] [address] since your last visit? Include any pap smears or colon screening.  No    Vitals 5/14/2021   Blood Pressure 145/75   Pulse 71   Temp 97.1   Resp 18   Height 5' 11\"   Weight 260 lb 1.6 oz   SpO2 93   BSA 2.43 m2

## 2021-05-14 NOTE — PROGRESS NOTES
[facility] [place] at [address], [address] [facility] [address] [facility]  W: [phone]  F: [phone]      Reason for Visit:   Sophy Puckett is a 66 y.o. male who is seen for follow up of mantle cell lymphoma. Treatment History:   · CTA Chest 11/11/2016: PE in left lower lobe pulmonary arteries, extensive adneopathy  · CT A/P 11/12/2016: Cirrhosis, massive splenomegaly, ascites, massive lymphadenopathy  · US guided axillary node biopsy 11/14/2016: CD5+ B-cell non-hodgkin lymphoma, phenotype most consistent with mantle cell lymphoma. FISH with t(11;14)  · PET-CT 11/23/2016: Marked splenomegaly with increased metabolic activity consistent with lymphoma. Bilateral cervical and axillary adenopathy. Mediastinal and right hilar and left diaphragmatic adenopathy. Bilateral pelvic and inguinal adenopathy. Multiple small mesenteric and retroperitoneal nodes with low grade or no activity. · BMBx 11/29/2016: Hypercellular marrow involved by mantle cell lymphoma  · Stage IV Mantle Cell Lymphoma  · Palliative chemotherapy with Rituximab and Bendamustine x6 cycles from 12/8/2016 to 5/12/17  · Bone marrow biopsy 5/23/2017: no evidence of lymphoma  · PET/CT 6/7/2017: There has been marked improvement in the adenopathy in the cervical region, axilla, mediastinum, abdomen and pelvis which now demonstrate no increased metabolic activity. There has been marked decrease in the spleen which now demonstrates no increased metabolic activity. · CT C/A/P 7/9/2020: New enlarged bilateral axillary lymph nodes, left greater than right, and new enlarged left iliac chain and femoral lymph nodes, highly suspicious for lymphoma recurrence. · PET 2/69/4595: Hypermetabolic bilateral axillary, pelvic, and left inguinal lymph nodes. Hypermetabolic soft tissue nodules in the arms bilaterally, face, and left posterior neck.  Small but hypermetabolic left parotid nodule may represent an intraparotid lymph node. Single hypermetabolic focus in the right glenoid may represent an underlying osseous lesion. · US guided lymph biopsy of left axillary node on 8/5/2020 consistent with mantle cell lymphoma. · Bone marrow biopsy 8/5/2020 negative for involvement  · Initiated palliative therapy with Acalabrutinib 100mg BID 8/26/2020    History of Present Illness:   Doing well overall. Having some pain due to arthritis in bilateral knees. He has gotten new shoes since last visit and feels this is helping with his pain. Taking acalabrutinib as prescribed. No nausea or vomiting. No change in bowels. Denies fever, chills, SOB, chest pain. He is unaccompanied today. Review of systems was obtained and pertinent findings reviewed above. Past medical history, social history, family history, medications, and allergies are located in the electronic medical record. Physical Exam:     Visit Vitals  BP (!) 145/75   Pulse 71   Temp 97.1 °F (36.2 °C) (Oral)   Resp 18   Ht 5' 11\" (1.803 m)   Wt 260 lb (117.9 kg)   SpO2 93%   BMI 36.26 kg/m²     ECOG PS: 0  General: no distress  Eyes: anicteric sclerae  HENT: oropharynx clear  Neck: supple  Lymphatic: no cervical, supraclavicular, or inguinal adenopathy  Respiratory: normal respiratory effort  CV: no peripheral edema  GI: soft, nontender, nondistended, no masses  Skin: no rashes; no ecchymoses; no petechiae      Results:     Lab Results   Component Value Date/Time    WBC 5.0 05/14/2021 09:49 AM    HGB 13.0 05/14/2021 09:49 AM    HCT 42.8 05/14/2021 09:49 AM    PLATELET 297 67/69/2550 09:49 AM    MCV 96.4 05/14/2021 09:49 AM    ABS.  NEUTROPHILS 3.0 05/14/2021 09:49 AM    Hemoglobin (POC) 10.9 (L) 01/12/2017 08:02 AM    Hematocrit (POC) 32 (L) 01/12/2017 08:02 AM     Lab Results   Component Value Date/Time    Sodium 139 05/14/2021 09:49 AM    Potassium 4.3 05/14/2021 09:49 AM    Chloride 106 05/14/2021 09:49 AM    CO2 29 05/14/2021 09:49 AM    Glucose 100 05/14/2021 09:49 AM BUN 22 (H) 05/14/2021 09:49 AM    Creatinine 1.05 05/14/2021 09:49 AM    GFR est AA >60 05/14/2021 09:49 AM    GFR est non-AA >60 05/14/2021 09:49 AM    Calcium 8.7 05/14/2021 09:49 AM    Sodium (POC) 143 01/12/2017 08:02 AM    Potassium (POC) 4.0 01/12/2017 08:02 AM    Chloride (POC) 104 01/12/2017 08:02 AM    Glucose (POC) 156 (H) 01/12/2017 08:02 AM    BUN (POC) 23 (H) 01/12/2017 08:02 AM    Creatinine (POC) 1.0 07/09/2020 09:37 AM    Calcium, ionized (POC) 1.19 01/12/2017 08:02 AM     Lab Results   Component Value Date/Time    Bilirubin, total 0.5 05/14/2021 09:49 AM    ALT (SGPT) 31 05/14/2021 09:49 AM    Alk. phosphatase 92 05/14/2021 09:49 AM    Protein, total 6.9 05/14/2021 09:49 AM    Albumin 3.4 (L) 05/14/2021 09:49 AM    Globulin 3.5 05/14/2021 09:49 AM     Lab Results   Component Value Date/Time    Reticulocyte count 1.8 11/10/2016 02:00 PM    Iron % saturation 11 (L) 11/10/2016 02:00 PM    TIBC 240 (L) 11/10/2016 02:00 PM    Ferritin 100 11/10/2016 02:00 PM    Vitamin B12 415 11/10/2016 02:00 PM    Folate 15.6 11/10/2016 02:00 PM    Haptoglobin 186 11/11/2016 12:04 PM     10/10/2019 10:27 AM    Beta-2 Microglobulin, serum 4.4 (H) 11/15/2016 05:21 AM    TSH 4.06 (H) 11/10/2016 11:21 AM    Hep C virus Ab Interp. NONREACTIVE 06/14/2018 10:09 AM     Lab Results   Component Value Date/Time    INR 1.0 06/14/2018 10:09 AM    aPTT 31.6 06/14/2018 10:09 AM    Fibrinogen 215 11/11/2016 12:04 PM       HepB/C negative  TTE 11/10/2016: EF 60%    Bone marrow biopsy 11/29/2016: Hypercellular marrow involved by mantle cell lymphoma    Bone marrow biopsy 5/23/17: no evidence of lymphoma    US abdomen 5/29/2018:  1. Increased hepatic echogenicity compatible with diffuse hepatocellular  process, most commonly seen in steatosis. 2. Areas of compromised evaluation due to extensive bowel gas as above with  nonvisualization of gallbladder and common bile duct. CT C/A/P 8/6/2018:  No lymphadenopathy in the chest, abdomen or pelvis. CT C/A/P 1/18/2019: No lymphadenopathy in the chest, abdomen, or pelvis. No significant change. No acute process. CT C/A/P 7/11/2019:  No evidence of lymphadenopathy. Pleural-based nodule left upper lobe unchanged. Cholelithiasis    CT C/A/P 7/9/2020:   1. New enlarged bilateral axillary lymph nodes, left greater than right, and new  enlarged left iliac chain and femoral lymph nodes, highly suspicious for  lymphoma recurrence. 2. Stable splenomegaly. 3. Hepatic steatosis. 4. Cholelithiasis. 5. Additional stable findings as above. CT C/A/P 11/18/2020:  Slight decrease in size of left pelvic lymph nodes. No other change from the prior exam. Relatively stable left axillary and mediastinal lymph nodes    CT C/A/P 4/21/2021:  Mildly enlarged left axillary, mediastinal, and pelvic lymph nodes are stable or slightly decreased in size. Unchanged splenomegaly. Assessment:   1) Mantle cell lymphoma, relapsed  Stage IV  He completed chemotherapy with R-Bendamustine in 5/2017 and then declined consolidative transplant or maintenance rituximab. Unfortunately, he now has relapsed disease confirmed with biopsy of left axillary node. He is currently on second line therapy with acalabrutinib beginning 8/2020. He is tolerating therapy well with manageable toxicity. Labs reviewed and stable today. CT imaging obtained since last visit reviewed in detail with stable disease. We will proceed with treatment. The patient will be seen every 6 weeks, and labs will be monitored to assess for toxicity from therapy. Consider CT every 3-6 months. I have offered referral to [facility] or [facility] for a second opinion and for discussion of trial options and/or CAR-T cell therapy, but he has declined for now. 2) DVT, PE  Diagnosed 11/11/2016. Malignancy associated. Previously on lovenox. Currently, on apixaban BID. I have recommended long-term anticoagulation. 3) Thrombocytopenia  Intermittent, mild. Possibly from splenomegaly/cirrhosis. Resolved on most recent labs. 4) HTN  Management per PCP. 5) Port care  Continue port flushes every 6 weeks. He prefers to have labs through port. Plan:     · Continue Acalabrutinib 100mg BID  · Continue apixiban 5mg PO BID  · Port flush every 6 weeks with labs (CBC, CMP)  · Return to see me with labs in 6 weeks    I have personally seen and evaluated the patient in conjunction with Valeria Watkins NP. I find the patient's history and physical exam are consistent with the NP's documentation. I agree with the above assessment and plan, which I have modified as needed. He is tolerating systemic therapy with manageable toxicity, so we will proceed with therapy. Recent CT shows some improvement on his disease volume.     Signed By: Andrés Pierre MD

## 2021-05-14 NOTE — PROGRESS NOTES
[facility] Short Visit Progress Note     Patient admitted to [facility] flush/Lab draw  ambulatory in stable condition. Assessment completed. No new concerns voiced. Covid Screening      1. Do you have any symptoms of COVID-19? SOB, coughing, fever, or generally not feeling well ? NO  2. Have you been exposed to COVID-19 recently? NO  3. Have you had any recent contact with family/friend that has a pending COVID test? NO    Vital Signs:  Visit Vitals  BP (!) 145/75   Pulse 71   Temp 97.1 °F (36.2 °C)   Resp 18   Ht 5' 11\" (1.803 m)   Wt 118 kg (260 lb 1.6 oz)   SpO2 93%   BMI 36.28 kg/m²         PAC with positive blood return. Lab Results:  Labs drawn and sent for processing. Medications:  Medications Administered     0.9% sodium chloride injection 10 mL     Admin Date  05/14/2021 Action  Given Dose  10 mL Route  IntraVENous Administered By  [facility] [place]          heparin (porcine) pf 500 Units     Admin Date  05/14/2021 Action  Given Dose  500 Units Route  IntraVENous Administered By  [facility] [place], [place]          sodium chloride (NS) flush 5-10 mL     Admin Date  05/14/2021 Action  Given Dose  10 mL Route  IntraVENous Administered By  [facility] [place], [place]                 Patient tolerated treatment well. Patient discharged from [place] [address] ambulatory in no distress at 3601 Patient aware of next appointment.     Future Appointments   Date Time Provider Bola Jara   6/22/2021 10:00 AM [facility]   7/9/2021 10:15 AM Nilda CARDONA NP [facility] AMB   8/3/2021  9:00 AM SS INF7 CH2 <1H [facility]   9/14/2021 10:00 AM SS INF7 [facility]2 [facility] [place]
done

## 2021-07-02 NOTE — PROGRESS NOTES
Andrew Dimas is a 66 y.o. male here for follow up of mantle cell lymphoma. 1. Have you been to the ER, urgent care clinic since your last visit? Hospitalized since your last visit? No    2. Have you seen or consulted any other health care providers outside of the [address] [place] [address] since your last visit? Include any pap smears or colon screening.  No

## 2021-07-02 NOTE — PROGRESS NOTES
[facility] at [address] [facility] 100[address] [facility]  W: [phone]  F: [phone]      Reason for Visit:   Lasha Grissom is a 66 y.o. male who is seen for follow up of mantle cell lymphoma. Treatment History:   · CTA Chest 11/11/2016: PE in left lower lobe pulmonary arteries, extensive adneopathy  · CT A/P 11/12/2016: Cirrhosis, massive splenomegaly, ascites, massive lymphadenopathy  · US guided axillary node biopsy 11/14/2016: CD5+ B-cell non-hodgkin lymphoma, phenotype most consistent with mantle cell lymphoma. FISH with t(11;14)  · PET-CT 11/23/2016: Marked splenomegaly with increased metabolic activity consistent with lymphoma. Bilateral cervical and axillary adenopathy. Mediastinal and right hilar and left diaphragmatic adenopathy. Bilateral pelvic and inguinal adenopathy. Multiple small mesenteric and retroperitoneal nodes with low grade or no activity. · BMBx 11/29/2016: Hypercellular marrow involved by mantle cell lymphoma  · Stage IV Mantle Cell Lymphoma  · Palliative chemotherapy with Rituximab and Bendamustine x6 cycles from 12/8/2016 to 5/12/17  · Bone marrow biopsy 5/23/2017: no evidence of lymphoma  · PET/CT 6/7/2017: There has been marked improvement in the adenopathy in the cervical region, axilla, mediastinum, abdomen and pelvis which now demonstrate no increased metabolic activity. There has been marked decrease in the spleen which now demonstrates no increased metabolic activity. · CT C/A/P 7/9/2020: New enlarged bilateral axillary lymph nodes, left greater than right, and new enlarged left iliac chain and femoral lymph nodes, highly suspicious for lymphoma recurrence. · PET 3/16/2001: Hypermetabolic bilateral axillary, pelvic, and left inguinal lymph nodes. Hypermetabolic soft tissue nodules in the arms bilaterally, face, and left posterior neck.  Small but hypermetabolic left parotid nodule may represent an intraparotid lymph node. Single hypermetabolic focus in the right glenoid may represent an underlying osseous lesion. · US guided lymph biopsy of left axillary node on 8/5/2020 consistent with mantle cell lymphoma. · Bone marrow biopsy 8/5/2020 negative for involvement  · Initiated palliative therapy with Acalabrutinib 100mg BID 8/26/2020    History of Present Illness:   He reports feeling \"fantastic. \"  No side effects from the medication. Chronic arthritis pain unchanged. Energy level has been good. Just returned from a trip to [place], drove straight through. No nausea. No fevers, chills, night sweats, unintentional weight loss, adenopathy. He is unaccompanied today. Review of systems was obtained and pertinent findings reviewed above. Past medical history, social history, family history, medications, and allergies are located in the electronic medical record. Physical Exam:     Visit Vitals  BP (!) 146/87 (BP 1 Location: Right arm, BP Patient Position: Sitting)   Pulse 82   Temp 98.3 °F (36.8 °C) (Temporal)   Resp 18   Ht 5' 11\" (1.803 m)   Wt 263 lb 12.8 oz (119.7 kg)   SpO2 93%   BMI 36.79 kg/m²     ECOG PS: 0  General: no distress  Eyes: anicteric sclerae  HENT: oropharynx clear  Neck: supple  Lymphatic: no cervical, supraclavicular, or inguinal adenopathy  Respiratory: normal respiratory effort  CV: no peripheral edema  GI: soft, nontender, nondistended, no masses  Skin: no rashes; no ecchymoses; no petechiae      Results:     Lab Results   Component Value Date/Time    WBC 5.0 05/14/2021 09:49 AM    HGB 13.0 05/14/2021 09:49 AM    HCT 42.8 05/14/2021 09:49 AM    PLATELET 706 11/55/6264 09:49 AM    MCV 96.4 05/14/2021 09:49 AM    ABS.  NEUTROPHILS 3.0 05/14/2021 09:49 AM    Hemoglobin (POC) 10.9 (L) 01/12/2017 08:02 AM    Hematocrit (POC) 32 (L) 01/12/2017 08:02 AM     Lab Results   Component Value Date/Time    Sodium 139 05/14/2021 09:49 AM    Potassium 4.3 05/14/2021 09:49 AM    Chloride 106 05/14/2021 09:49 AM    CO2 29 05/14/2021 09:49 AM    Glucose 100 05/14/2021 09:49 AM    BUN 22 (H) 05/14/2021 09:49 AM    Creatinine 1.05 05/14/2021 09:49 AM    GFR est AA >60 05/14/2021 09:49 AM    GFR est non-AA >60 05/14/2021 09:49 AM    Calcium 8.7 05/14/2021 09:49 AM    Sodium (POC) 143 01/12/2017 08:02 AM    Potassium (POC) 4.0 01/12/2017 08:02 AM    Chloride (POC) 104 01/12/2017 08:02 AM    Glucose (POC) 156 (H) 01/12/2017 08:02 AM    BUN (POC) 23 (H) 01/12/2017 08:02 AM    Creatinine (POC) 1.0 07/09/2020 09:37 AM    Calcium, ionized (POC) 1.19 01/12/2017 08:02 AM     Lab Results   Component Value Date/Time    Bilirubin, total 0.5 05/14/2021 09:49 AM    ALT (SGPT) 31 05/14/2021 09:49 AM    Alk. phosphatase 92 05/14/2021 09:49 AM    Protein, total 6.9 05/14/2021 09:49 AM    Albumin 3.4 (L) 05/14/2021 09:49 AM    Globulin 3.5 05/14/2021 09:49 AM     Lab Results   Component Value Date/Time    Reticulocyte count 1.8 11/10/2016 02:00 PM    Iron % saturation 11 (L) 11/10/2016 02:00 PM    TIBC 240 (L) 11/10/2016 02:00 PM    Ferritin 100 11/10/2016 02:00 PM    Vitamin B12 415 11/10/2016 02:00 PM    Folate 15.6 11/10/2016 02:00 PM    Haptoglobin 186 11/11/2016 12:04 PM     10/10/2019 10:27 AM    Beta-2 Microglobulin, serum 4.4 (H) 11/15/2016 05:21 AM    TSH 4.06 (H) 11/10/2016 11:21 AM    Hep C virus Ab Interp. NONREACTIVE 06/14/2018 10:09 AM     Lab Results   Component Value Date/Time    INR 1.0 06/14/2018 10:09 AM    aPTT 31.6 06/14/2018 10:09 AM    Fibrinogen 215 11/11/2016 12:04 PM       HepB/C negative  TTE 11/10/2016: EF 60%    Bone marrow biopsy 11/29/2016: Hypercellular marrow involved by mantle cell lymphoma    Bone marrow biopsy 5/23/17: no evidence of lymphoma    US abdomen 5/29/2018:  1. Increased hepatic echogenicity compatible with diffuse hepatocellular  process, most commonly seen in steatosis.   2. Areas of compromised evaluation due to extensive bowel gas as above with  nonvisualization of gallbladder and common bile duct. CT C/A/P 8/6/2018: No lymphadenopathy in the chest, abdomen or pelvis. CT C/A/P 1/18/2019: No lymphadenopathy in the chest, abdomen, or pelvis. No significant change. No acute process. CT C/A/P 7/11/2019:  No evidence of lymphadenopathy. Pleural-based nodule left upper lobe unchanged. Cholelithiasis    CT C/A/P 7/9/2020:   1. New enlarged bilateral axillary lymph nodes, left greater than right, and new  enlarged left iliac chain and femoral lymph nodes, highly suspicious for  lymphoma recurrence. 2. Stable splenomegaly. 3. Hepatic steatosis. 4. Cholelithiasis. 5. Additional stable findings as above. CT C/A/P 11/18/2020:  Slight decrease in size of left pelvic lymph nodes. No other change from the prior exam. Relatively stable left axillary and mediastinal lymph nodes    CT C/A/P 4/21/2021:  Mildly enlarged left axillary, mediastinal, and pelvic lymph nodes are stable or slightly decreased in size. Unchanged splenomegaly. Assessment:   1) Mantle cell lymphoma, relapsed  Stage IV  He completed chemotherapy with R-Bendamustine in 5/2017 and then declined consolidative transplant or maintenance rituximab. Unfortunately, he now has relapsed disease confirmed with biopsy of left axillary node. He is currently on second line therapy with acalabrutinib beginning 8/2020. He is tolerating therapy well with manageable toxicity. Labs pending from today. We will continue with treatment pending these results. The patient will be seen every 6 weeks, and labs will be monitored to assess for toxicity from therapy. Consider CT every 3-6 months. I have offered referral to [facility] or [facility] for a second opinion and for discussion of trial options and/or CAR-T cell therapy, but he has declined for now. 2) DVT, PE  Diagnosed 11/11/2016. Malignancy associated. Previously on lovenox. Currently, on apixaban BID. I have recommended long-term anticoagulation.     3) Thrombocytopenia  Intermittent, mild. Possibly from splenomegaly/cirrhosis. Resolved on most recent labs. Repeat labs pending today. 4) Port care  Continue port flushes every 6 weeks. He prefers to have labs through port.      Plan:     · Continue Acalabrutinib 100mg BID  · Continue apixiban 5mg PO BID  · Port flush every 6 weeks with labs (CBC, CMP)  · CT C/A/P before next visit  · Return to see me with labs in 6 weeks    Signed By: Cathi Sapp MD

## 2021-07-02 NOTE — PROGRESS NOTES
[facility] Progress Note    Date: 2021    Name: Wilfred Costa    MRN: [id number]         : 1945    Mr. Hernandez Arrived ambulatory and in no distress for [facility] Flush/Labs  Assessment was completed, no acute issues at this time, no new complaints voiced. Right chest wall port accessed without difficulty, labs drawn & sent for processing. Mr. Marcio Aguero vitals were reviewed. Visit Vitals  BP (!) 143/76 (BP 1 Location: Right arm, BP Patient Position: Sitting)   Pulse 78   Temp 97.9 °F (36.6 °C)   Resp 18   Ht 5' 11\" (1.803 m)   Wt 119.5 kg (263 lb 8 oz)   SpO2 93%   BMI 36.75 kg/m²       Labs were obtained and pending in [facility]. Medications:  NS Flush  Heparin Flush    Mr. Ty tolerated treatment well and was discharged from [place] [address] in stable condition at [address] de-accessed, flushed & heparinized per protocol. He is to return on 2021 at 1000am for his next appointment.     Brenda Alfonso RN  2021

## 2021-08-12 NOTE — PROGRESS NOTES
[facility] at [address] 100[address] [facility]  W: [phone]  F: [phone]      Reason for Visit:   Amita Boyer is a 64 y.o. male who is seen for follow up of mantle cell lymphoma. Treatment History:   · CTA Chest 11/11/2016: PE in left lower lobe pulmonary arteries, extensive adneopathy  · CT A/P 11/12/2016: Cirrhosis, massive splenomegaly, ascites, massive lymphadenopathy  · US guided axillary node biopsy 11/14/2016: CD5+ B-cell non-hodgkin lymphoma, phenotype most consistent with mantle cell lymphoma. FISH with t(11;14)  · PET-CT 11/23/2016: Marked splenomegaly with increased metabolic activity consistent with lymphoma. Bilateral cervical and axillary adenopathy. Mediastinal and right hilar and left diaphragmatic adenopathy. Bilateral pelvic and inguinal adenopathy. Multiple small mesenteric and retroperitoneal nodes with low grade or no activity. · BMBx 11/29/2016: Hypercellular marrow involved by mantle cell lymphoma  · Stage IV Mantle Cell Lymphoma  · Palliative chemotherapy with Rituximab and Bendamustine x6 cycles from 12/8/2016 to 5/12/17  · Bone marrow biopsy 5/23/2017: no evidence of lymphoma  · PET/CT 6/7/2017: There has been marked improvement in the adenopathy in the cervical region, axilla, mediastinum, abdomen and pelvis which now demonstrate no increased metabolic activity. There has been marked decrease in the spleen which now demonstrates no increased metabolic activity. · CT C/A/P 7/9/2020: New enlarged bilateral axillary lymph nodes, left greater than right, and new enlarged left iliac chain and femoral lymph nodes, highly suspicious for lymphoma recurrence. · PET 6/28/1332: Hypermetabolic bilateral axillary, pelvic, and left inguinal lymph nodes. Hypermetabolic soft tissue nodules in the arms bilaterally, face, and left posterior neck.  Small but hypermetabolic left parotid nodule may represent an intraparotid lymph node. Single hypermetabolic focus in the right glenoid may represent an underlying osseous lesion. · US guided lymph biopsy of left axillary node on 8/5/2020 consistent with mantle cell lymphoma. · Bone marrow biopsy 8/5/2020 negative for involvement  · Initiated palliative therapy with Acalabrutinib 100mg BID 8/26/2020    History of Present Illness:   He continues to tolerate therapy well. No side effects that he can tell. Energy good. No recent infections. No fevers, chills, night sweats, unintentional weight loss, adenopathy. Recent cataract surgery went well. He is unaccompanied today. Review of systems was obtained and pertinent findings reviewed above. Past medical history, social history, family history, medications, and allergies are located in the electronic medical record. Physical Exam:     Visit Vitals  /74   Pulse 82   Temp 97 °F (36.1 °C)   Resp 18   Ht 5' 11\" (1.803 m)   Wt 259 lb (117.5 kg)   SpO2 94%   BMI 36.12 kg/m²     ECOG PS: 0  General: no distress  Eyes: anicteric sclerae  HENT: oropharynx clear  Neck: supple  Lymphatic: no cervical, supraclavicular, or inguinal adenopathy  Respiratory: normal respiratory effort  CV: no peripheral edema  GI: soft, nontender, nondistended, no masses  Skin: no rashes; no ecchymoses; no petechiae      Results:     Lab Results   Component Value Date/Time    WBC 4.9 08/17/2021 11:06 AM    HGB 12.9 08/17/2021 11:06 AM    HCT 40.9 08/17/2021 11:06 AM    PLATELET 116 (L) 71/07/1699 11:06 AM    MCV 95.6 08/17/2021 11:06 AM    ABS.  NEUTROPHILS 3.2 08/17/2021 11:06 AM    Hemoglobin (POC) 10.9 (L) 01/12/2017 08:02 AM    Hematocrit (POC) 32 (L) 01/12/2017 08:02 AM     Lab Results   Component Value Date/Time    Sodium 140 07/02/2021 10:38 AM    Potassium 3.9 07/02/2021 10:38 AM    Chloride 109 (H) 07/02/2021 10:38 AM    CO2 30 07/02/2021 10:38 AM    Glucose 97 07/02/2021 10:38 AM    BUN 14 07/02/2021 10:38 AM    Creatinine 0.88 07/02/2021 10:38 AM    GFR est AA >60 07/02/2021 10:38 AM    GFR est non-AA >60 07/02/2021 10:38 AM    Calcium 7.9 (L) 07/02/2021 10:38 AM    Sodium (POC) 143 01/12/2017 08:02 AM    Potassium (POC) 4.0 01/12/2017 08:02 AM    Chloride (POC) 104 01/12/2017 08:02 AM    Glucose (POC) 156 (H) 01/12/2017 08:02 AM    BUN (POC) 23 (H) 01/12/2017 08:02 AM    Creatinine (POC) 1.0 07/09/2020 09:37 AM    Calcium, ionized (POC) 1.19 01/12/2017 08:02 AM     Lab Results   Component Value Date/Time    Bilirubin, total 0.7 07/02/2021 10:38 AM    ALT (SGPT) 38 07/02/2021 10:38 AM    Alk. phosphatase 92 07/02/2021 10:38 AM    Protein, total 6.7 07/02/2021 10:38 AM    Albumin 3.3 (L) 07/02/2021 10:38 AM    Globulin 3.4 07/02/2021 10:38 AM     Lab Results   Component Value Date/Time    Reticulocyte count 1.8 11/10/2016 02:00 PM    Iron % saturation 11 (L) 11/10/2016 02:00 PM    TIBC 240 (L) 11/10/2016 02:00 PM    Ferritin 100 11/10/2016 02:00 PM    Vitamin B12 415 11/10/2016 02:00 PM    Folate 15.6 11/10/2016 02:00 PM    Haptoglobin 186 11/11/2016 12:04 PM     10/10/2019 10:27 AM    Beta-2 Microglobulin, serum 4.4 (H) 11/15/2016 05:21 AM    TSH 4.06 (H) 11/10/2016 11:21 AM    Hep C virus Ab Interp. NONREACTIVE 06/14/2018 10:09 AM     Lab Results   Component Value Date/Time    INR 1.0 06/14/2018 10:09 AM    aPTT 31.6 06/14/2018 10:09 AM    Fibrinogen 215 11/11/2016 12:04 PM       HepB/C negative  TTE 11/10/2016: EF 60%    Bone marrow biopsy 11/29/2016: Hypercellular marrow involved by mantle cell lymphoma    Bone marrow biopsy 5/23/17: no evidence of lymphoma    CT C/A/P 11/18/2020:  Slight decrease in size of left pelvic lymph nodes. No other change from the prior exam. Relatively stable left axillary and mediastinal lymph nodes    CT C/A/P 4/21/2021:  Mildly enlarged left axillary, mediastinal, and pelvic lymph nodes are stable or slightly decreased in size. Unchanged splenomegaly.     CT C/A/P 8/9/2021:  Stable mild left axillary, mediastinal, and pelvic lymphadenopathy. No new  lymphadenopathy in the chest, abdomen, or pelvis. Decreased mild splenomegaly. Assessment:   1) Mantle cell lymphoma, relapsed  Stage IV  He completed chemotherapy with R-Bendamustine in 5/2017 and then declined consolidative transplant or maintenance rituximab. Unfortunately, he now has relapsed disease confirmed with biopsy of left axillary node. He is currently on second line therapy with acalabrutinib beginning 8/2020. He is tolerating therapy well with manageable toxicity. CBC from today looks ok, chemistry panel pending. CT done recently looks ok, overall stable. We will continue with treatment. The patient will be seen every 6 weeks, and labs will be monitored to assess for toxicity from therapy. Consider CT every 3-6 months. I have offered referral to [facility] or [facility] for a second opinion and for discussion of trial options and/or CAR-T cell therapy, but he has declined for now. 2) DVT, PE  Diagnosed 11/11/2016. Malignancy associated. Previously on lovenox. Currently, on apixaban BID. I have recommended long-term anticoagulation. 3) Thrombocytopenia  Intermittent, mild. Possibly from splenomegaly, possibly from lymphoma or acalabrutinib. Stable. Monitor. 4) Port care  Continue port flushes every 6 weeks when obtaining labs. He prefers to have labs through port.      Plan:     · Continue Acalabrutinib 100mg BID  · Continue apixiban 5mg PO BID  · Port flush every 6 weeks with labs (CBC, CMP)  · CT C/A/P every 3-6 months  · Return to see me with labs in 6 weeks    Signed By: Mine Berg MD

## 2021-08-17 NOTE — PROGRESS NOTES
[facility] Short Visit Progress Note    1100  Patient admitted to [place] for [facility]y [facility]inet flush/labs ambulatory in stable condition. Assessment completed. No new concerns voiced. Vital Signs:  Visit Vitals  /74   Pulse 82   Temp 97 °F (36.1 °C)   Resp 18   Ht 5' 11\" (1.803 m)   Wt 117.5 kg (259 lb 1.6 oz)   SpO2 94%   BMI 36.14 kg/m²         Right chest wall port  with positive blood return. Lab Results:  Labs drawn from port and sent for processing. Medications:  Medications Administered     0.9% sodium chloride injection 10 mL     Admin Date  08/17/2021 Action  Given Dose  10 mL Route  IntraVENous Administered By  Dheeraj Simeon, RN          heparin (porcine) pf 500 Units     Admin Date  08/17/2021 Action  Given Dose  500 Units Route  IntraVENous Administered By  Dheeraj Simeon, MARTHA                5502 Patient tolerated treatment well. Patient discharged from [place] [address] ambulatory in no distress. Patient aware of next appointment on 9/28/21.      Florin Goins RN    Future Appointments   Date Time Provider Bola Mgi   9/21/2021 11:15 AM Tha CARDONA, NP [facility] BS AMB   9/28/2021 11:00 AM SS INF4 CH4 <1H [facility] AGUILERA   11/9/2021 11:00 AM SS INF4 CH4 <1H [facility] [address] [place] [address]

## 2021-08-17 NOTE — PROGRESS NOTES
Kamilla Santos is a 64 y.o. male follow up for mantle cell lymphoma. 1. Have you been to the ER, urgent care clinic since your last visit? Hospitalized since your last visit?no     2. Have you seen or consulted any other health care providers outside of the [address] [place] [address] since your last visit? Include any pap smears or colon screening.  No    Vitals 8/17/2021   Blood Pressure 134/74   Pulse 82   Temp 97   Resp 18   Height 5' 11\"   Weight 259 lb 1.6 oz   SpO2 94   BSA 2.43 m2   BMI 36.14 kg/m2   BP comment

## 2021-09-27 NOTE — PROGRESS NOTES
[facility] at [address], [address] [facility] [address] [facility]  W: [phone]  F: [phone]      Reason for Visit:   Bob Tran is a 64 y.o. male who is seen for follow up of mantle cell lymphoma. Treatment History:   · CTA Chest 11/11/2016: PE in left lower lobe pulmonary arteries, extensive adneopathy  · CT A/P 11/12/2016: Cirrhosis, massive splenomegaly, ascites, massive lymphadenopathy  · US guided axillary node biopsy 11/14/2016: CD5+ B-cell non-hodgkin lymphoma, phenotype most consistent with mantle cell lymphoma. FISH with t(11;14)  · PET-CT 11/23/2016: Marked splenomegaly with increased metabolic activity consistent with lymphoma. Bilateral cervical and axillary adenopathy. Mediastinal and right hilar and left diaphragmatic adenopathy. Bilateral pelvic and inguinal adenopathy. Multiple small mesenteric and retroperitoneal nodes with low grade or no activity. · BMBx 11/29/2016: Hypercellular marrow involved by mantle cell lymphoma  · Stage IV Mantle Cell Lymphoma  · Palliative chemotherapy with Rituximab and Bendamustine x6 cycles from 12/8/2016 to 5/12/17  · Bone marrow biopsy 5/23/2017: no evidence of lymphoma  · PET/CT 6/7/2017: There has been marked improvement in the adenopathy in the cervical region, axilla, mediastinum, abdomen and pelvis which now demonstrate no increased metabolic activity. There has been marked decrease in the spleen which now demonstrates no increased metabolic activity. · CT C/A/P 7/9/2020: New enlarged bilateral axillary lymph nodes, left greater than right, and new enlarged left iliac chain and femoral lymph nodes, highly suspicious for lymphoma recurrence. · PET 9/01/6136: Hypermetabolic bilateral axillary, pelvic, and left inguinal lymph nodes. Hypermetabolic soft tissue nodules in the arms bilaterally, face, and left posterior neck.  Small but hypermetabolic left parotid nodule may represent an intraparotid lymph node. Single hypermetabolic focus in the right glenoid may represent an underlying osseous lesion. · US guided lymph biopsy of left axillary node on 8/5/2020 consistent with mantle cell lymphoma. · Bone marrow biopsy 8/5/2020 negative for involvement  · Initiated palliative therapy with Acalabrutinib 100mg BID 8/26/2020    History of Present Illness:   Doing well on Acalabrutinib. Confirms taking twice a day as prescribed. Taking Eliquis as prescribed. No bruising or bleeding noted. Arthritis is bothersome at times. Staying active. Denies pain today. No nausea or vomiting. No change in bowels. No itching or rash. He is unaccompanied today. Review of systems was obtained and pertinent findings reviewed above. Past medical history, social history, family history, medications, and allergies are located in the electronic medical record. Physical Exam:     Visit Vitals  /67   Pulse 63   Temp 98.2 °F (36.8 °C)   Resp 18   Ht 5' 11\" (1.803 m)   Wt 262 lb (118.8 kg)   SpO2 91%   BMI 36.54 kg/m²     ECOG PS: 0  General: no distress  Eyes: anicteric sclerae  HENT: oropharynx clear  Neck: supple  Lymphatic: no cervical, supraclavicular adenopathy  Respiratory: normal respiratory effort  CV: no peripheral edema  GI: soft, nontender, nondistended, no masses  Skin: no rashes; no ecchymoses; no petechiae      Results:     Lab Results   Component Value Date/Time    WBC 5.5 09/28/2021 11:01 AM    HGB 12.2 09/28/2021 11:01 AM    HCT 38.0 09/28/2021 11:01 AM    PLATELET 95 (L) 59/93/6898 11:01 AM    MCV 96.0 09/28/2021 11:01 AM    ABS.  NEUTROPHILS 3.2 09/28/2021 11:01 AM    Hemoglobin (POC) 10.9 (L) 01/12/2017 08:02 AM    Hematocrit (POC) 32 (L) 01/12/2017 08:02 AM     Lab Results   Component Value Date/Time    Sodium 138 08/17/2021 11:06 AM    Potassium 4.2 08/17/2021 11:06 AM    Chloride 106 08/17/2021 11:06 AM    CO2 28 08/17/2021 11:06 AM    Glucose 205 (H) 08/17/2021 11:06 AM    BUN 26 (H) 08/17/2021 11:06 AM    Creatinine 1.33 (H) 08/17/2021 11:06 AM    GFR est AA >60 08/17/2021 11:06 AM    GFR est non-AA 52 (L) 08/17/2021 11:06 AM    Calcium 8.1 (L) 08/17/2021 11:06 AM    Sodium (POC) 143 01/12/2017 08:02 AM    Potassium (POC) 4.0 01/12/2017 08:02 AM    Chloride (POC) 104 01/12/2017 08:02 AM    Glucose (POC) 156 (H) 01/12/2017 08:02 AM    BUN (POC) 23 (H) 01/12/2017 08:02 AM    Creatinine (POC) 1.0 07/09/2020 09:37 AM    Calcium, ionized (POC) 1.19 01/12/2017 08:02 AM     Lab Results   Component Value Date/Time    Bilirubin, total 0.6 08/17/2021 11:06 AM    ALT (SGPT) 29 08/17/2021 11:06 AM    Alk. phosphatase 95 08/17/2021 11:06 AM    Protein, total 6.8 08/17/2021 11:06 AM    Albumin 3.4 (L) 08/17/2021 11:06 AM    Globulin 3.4 08/17/2021 11:06 AM     Lab Results   Component Value Date/Time    Reticulocyte count 1.8 11/10/2016 02:00 PM    Iron % saturation 11 (L) 11/10/2016 02:00 PM    TIBC 240 (L) 11/10/2016 02:00 PM    Ferritin 100 11/10/2016 02:00 PM    Vitamin B12 415 11/10/2016 02:00 PM    Folate 15.6 11/10/2016 02:00 PM    Haptoglobin 186 11/11/2016 12:04 PM     10/10/2019 10:27 AM    Beta-2 Microglobulin, serum 4.4 (H) 11/15/2016 05:21 AM    TSH 4.06 (H) 11/10/2016 11:21 AM    Hep C virus Ab Interp. NONREACTIVE 06/14/2018 10:09 AM     Lab Results   Component Value Date/Time    INR 1.0 06/14/2018 10:09 AM    aPTT 31.6 06/14/2018 10:09 AM    Fibrinogen 215 11/11/2016 12:04 PM       HepB/C negative  TTE 11/10/2016: EF 60%    Bone marrow biopsy 11/29/2016: Hypercellular marrow involved by mantle cell lymphoma    Bone marrow biopsy 5/23/17: no evidence of lymphoma    CT C/A/P 11/18/2020:  Slight decrease in size of left pelvic lymph nodes. No other change from the prior exam. Relatively stable left axillary and mediastinal lymph nodes    CT C/A/P 4/21/2021:  Mildly enlarged left axillary, mediastinal, and pelvic lymph nodes are stable or slightly decreased in size. Unchanged splenomegaly.     CT C/A/P 8/9/2021:  Stable mild left axillary, mediastinal, and pelvic lymphadenopathy. No new  lymphadenopathy in the chest, abdomen, or pelvis. Decreased mild splenomegaly. Assessment:   1) Mantle cell lymphoma, relapsed  Stage IV  He completed chemotherapy with R-Bendamustine in 5/2017 and then declined consolidative transplant or maintenance rituximab. Unfortunately, he now has relapsed disease confirmed with biopsy of left axillary node. He is currently on second line therapy with acalabrutinib beginning 8/2020. He is tolerating therapy well with manageable toxicity. Labs pending today. We will continue with treatment. The patient will be seen every 6 weeks, and labs will be monitored to assess for toxicity from therapy. Consider CT every 3-6 months. I have offered referral to [facility] or [facility] for a second opinion and for discussion of trial options and/or CAR-T cell therapy, but he has declined for now. 2) DVT, PE  Diagnosed 11/11/2016. Malignancy associated. Previously on lovenox. Currently, on apixaban BID. I have recommended long-term anticoagulation. 3) Thrombocytopenia  Intermittent, mild. Possibly from splenomegaly, possibly from lymphoma or acalabrutinib. Stable. Monitor. 4) Port care  Continue port flushes every 6 weeks when obtaining labs. He prefers to have labs through port. Plan:     · Continue Acalabrutinib 100mg BID  · Continue apixiban 5mg PO BID  · Port flush every 6 weeks with labs (CBC, CMP)  · CT C/A/P every 3-6 months  · Recommend COVID booster and flu vaccination  · Return to see me with labs in 6 weeks    I have personally seen and evaluated the patient in conjunction with Christine Moser NP. I find the patient's history and physical exam are consistent with the NP's documentation. I agree with the above assessment and plan, which I have modified as needed. He is tolerating systemic therapy with manageable toxicity, so we will proceed with treatment.       Signed By: Timoteo Lopez Loli Lazaro MD

## 2021-09-28 NOTE — PROGRESS NOTES
Outpatient Infusion Center   Visit Progress Note    Patient admitted to [facility] for [facility] [address] Flush+Labs ambulatory in stable condition. Visit Vitals  /67   Pulse 63   Temp 98.2 °F (36.8 °C)   Resp 18   SpO2 91%       Right chest PAC accessed with positive blood return. Labs drawn and sent for processing. Medications:  NS Flush  Heparin Flush    Labs pending in CC. Patient tolerated treatment well. Patient discharged from [place] [address] ambulatory in no distress. Patient aware of next appointment.     Future Appointments   Date Time Provider Bola Jara   11/9/2021 10:45 AM Orion العلي NP [facility] BS AMB   11/9/2021 11:00 AM SS INF4 CH4 <1H [facility]

## 2021-09-28 NOTE — PROGRESS NOTES
Analia Mart is a 64 y.o. male follow up for mantle cell lymphoma. 1. Have you been to the ER, urgent care clinic since your last visit? Hospitalized since your last visit?no     2. Have you seen or consulted any other health care providers outside of the [address] [place] [address] since your last visit? Include any pap smears or colon screening.  No    Vitals 9/28/2021   Blood Pressure 131/67   Pulse 63   Temp 98.2   Resp 18   Height    Weight    SpO2 91

## 2021-11-09 NOTE — PROGRESS NOTES
[facility] at [address], [address] [facility] [address] [facility]  W: [phone]  F: [phone]      Reason for Visit:   Jennifer White is a 64 y.o. male who is seen for follow up of mantle cell lymphoma. Treatment History:   · CTA Chest 11/11/2016: PE in left lower lobe pulmonary arteries, extensive adneopathy  · CT A/P 11/12/2016: Cirrhosis, massive splenomegaly, ascites, massive lymphadenopathy  · US guided axillary node biopsy 11/14/2016: CD5+ B-cell non-hodgkin lymphoma, phenotype most consistent with mantle cell lymphoma. FISH with t(11;14)  · PET-CT 11/23/2016: Marked splenomegaly with increased metabolic activity consistent with lymphoma. Bilateral cervical and axillary adenopathy. Mediastinal and right hilar and left diaphragmatic adenopathy. Bilateral pelvic and inguinal adenopathy. Multiple small mesenteric and retroperitoneal nodes with low grade or no activity. · BMBx 11/29/2016: Hypercellular marrow involved by mantle cell lymphoma  · Stage IV Mantle Cell Lymphoma  · Palliative chemotherapy with Rituximab and Bendamustine x6 cycles from 12/8/2016 to 5/12/17  · Bone marrow biopsy 5/23/2017: no evidence of lymphoma  · PET/CT 6/7/2017: There has been marked improvement in the adenopathy in the cervical region, axilla, mediastinum, abdomen and pelvis which now demonstrate no increased metabolic activity. There has been marked decrease in the spleen which now demonstrates no increased metabolic activity. · CT C/A/P 7/9/2020: New enlarged bilateral axillary lymph nodes, left greater than right, and new enlarged left iliac chain and femoral lymph nodes, highly suspicious for lymphoma recurrence. · PET 9/84/4065: Hypermetabolic bilateral axillary, pelvic, and left inguinal lymph nodes. Hypermetabolic soft tissue nodules in the arms bilaterally, face, and left posterior neck.  Small but hypermetabolic left parotid nodule may represent an intraparotid lymph node. Single hypermetabolic focus in the right glenoid may represent an underlying osseous lesion. · US guided lymph biopsy of left axillary node on 8/5/2020 consistent with mantle cell lymphoma. · Bone marrow biopsy 8/5/2020 negative for involvement  · Initiated palliative therapy with Acalabrutinib 100mg BID 8/26/2020    History of Present Illness:   Doing well on Acalabrutinib. Taking Eliquis as prescribed. Requests refill today. No bleeding noted. Some mild bruising with bumping himself. Appetite has been good. No nausea or vomiting. No change in bowels. Continues with arthritis pain in lower legs/knees. No current falls. He is unaccompanied today. Review of systems was obtained and pertinent findings reviewed above. Past medical history, social history, family history, medications, and allergies are located in the electronic medical record. Physical Exam:     Visit Vitals  BP (!) 141/79   Pulse 75   Temp 98.1 °F (36.7 °C)   Resp 20   Ht 5' 11\" (1.803 m)   Wt 261 lb (118.4 kg)   SpO2 93%   BMI 36.40 kg/m²     ECOG PS: 0  General: no distress  Eyes: anicteric sclerae  HENT: oropharynx clear  Neck: supple  Lymphatic: no cervical, supraclavicular adenopathy  Respiratory: normal respiratory effort  CV: no peripheral edema  GI: soft, nontender, nondistended, no masses  Skin: no rashes; no ecchymoses; no petechiae      Results:     Lab Results   Component Value Date/Time    WBC 4.9 11/09/2021 10:58 AM    HGB 11.3 (L) 11/09/2021 10:58 AM    HCT 35.1 (L) 11/09/2021 10:58 AM    PLATELET 73 (L) 96/73/2591 10:58 AM    MCV 97.5 11/09/2021 10:58 AM    ABS.  NEUTROPHILS 2.7 11/09/2021 10:58 AM    Hemoglobin (POC) 10.9 (L) 01/12/2017 08:02 AM    Hematocrit (POC) 32 (L) 01/12/2017 08:02 AM     Lab Results   Component Value Date/Time    Sodium 139 11/09/2021 10:58 AM    Potassium 4.2 11/09/2021 10:58 AM    Chloride 106 11/09/2021 10:58 AM    CO2 27 11/09/2021 10:58 AM    Glucose 176 (H) 11/09/2021 10:58 AM BUN 20 11/09/2021 10:58 AM    Creatinine 1.14 11/09/2021 10:58 AM    GFR est AA >60 11/09/2021 10:58 AM    GFR est non-AA >60 11/09/2021 10:58 AM    Calcium 8.9 11/09/2021 10:58 AM    Sodium (POC) 143 01/12/2017 08:02 AM    Potassium (POC) 4.0 01/12/2017 08:02 AM    Chloride (POC) 104 01/12/2017 08:02 AM    Glucose (POC) 156 (H) 01/12/2017 08:02 AM    BUN (POC) 23 (H) 01/12/2017 08:02 AM    Creatinine (POC) 1.0 07/09/2020 09:37 AM    Calcium, ionized (POC) 1.19 01/12/2017 08:02 AM     Lab Results   Component Value Date/Time    Bilirubin, total 0.9 11/09/2021 10:58 AM    ALT (SGPT) 33 11/09/2021 10:58 AM    Alk. phosphatase 90 11/09/2021 10:58 AM    Protein, total 6.8 11/09/2021 10:58 AM    Albumin 3.4 (L) 11/09/2021 10:58 AM    Globulin 3.4 11/09/2021 10:58 AM     Lab Results   Component Value Date/Time    Reticulocyte count 1.8 11/10/2016 02:00 PM    Iron % saturation 11 (L) 11/10/2016 02:00 PM    TIBC 240 (L) 11/10/2016 02:00 PM    Ferritin 100 11/10/2016 02:00 PM    Vitamin B12 415 11/10/2016 02:00 PM    Folate 15.6 11/10/2016 02:00 PM    Haptoglobin 186 11/11/2016 12:04 PM     10/10/2019 10:27 AM    Beta-2 Microglobulin, serum 4.4 (H) 11/15/2016 05:21 AM    TSH 4.06 (H) 11/10/2016 11:21 AM    Hep C virus Ab Interp. NONREACTIVE 06/14/2018 10:09 AM     Lab Results   Component Value Date/Time    INR 1.0 06/14/2018 10:09 AM    aPTT 31.6 06/14/2018 10:09 AM    Fibrinogen 215 11/11/2016 12:04 PM       HepB/C negative  TTE 11/10/2016: EF 60%    Bone marrow biopsy 11/29/2016: Hypercellular marrow involved by mantle cell lymphoma    Bone marrow biopsy 5/23/17: no evidence of lymphoma    CT C/A/P 11/18/2020:  Slight decrease in size of left pelvic lymph nodes. No other change from the prior exam. Relatively stable left axillary and mediastinal lymph nodes    CT C/A/P 4/21/2021:  Mildly enlarged left axillary, mediastinal, and pelvic lymph nodes are stable or slightly decreased in size.  Unchanged splenomegaly. CT C/A/P 8/9/2021:  Stable mild left axillary, mediastinal, and pelvic lymphadenopathy. No new  lymphadenopathy in the chest, abdomen, or pelvis. Decreased mild splenomegaly. Assessment:   1) Mantle cell lymphoma, relapsed  Stage IV  He completed chemotherapy with R-Bendamustine in 5/2017 and then declined consolidative transplant or maintenance rituximab. Unfortunately, he now has relapsed disease confirmed with biopsy of left axillary node. He is currently on second line therapy with acalabrutinib beginning 8/2020. He is tolerating therapy well with manageable toxicity. Labs pending today. We will continue with treatment. The patient will be seen every 6 weeks, and labs will be monitored to assess for toxicity from therapy. Consider CT in 3 months. I have offered referral to [facility] or [facility] for a second opinion and for discussion of trial options and/or CAR-T cell therapy, but he has declined for now. 2) DVT, PE  Diagnosed 11/11/2016. Malignancy associated. Previously on lovenox. Currently, on apixaban BID. I have recommended long-term anticoagulation. Refilled today. 3) Thrombocytopenia  Intermittent, mild. Possibly from splenomegaly, possibly from lymphoma or acalabrutinib. Stable. Monitor. 4) Port care  Continue port flushes every 6 weeks when obtaining labs. He prefers to have labs through port. He received flu vaccine and COVID booster since last visit. Plan:     · Continue Acalabrutinib 100mg BID  · Continue apixiban 5mg PO BID, refilled  · Port flush every 6 weeks with labs (CBC, CMP)  · CT C/A/P in 12 weeks  · Return to see me with labs in 6 weeks    I have personally seen and evaluated the patient in conjunction with Cici Greene NP. I find the patient's history and physical exam are consistent with the NP's documentation. I agree with the above assessment and plan, which I have modified as needed.   He is tolerating systemic therapy with manageable toxicity, so we will proceed with treatment.       Signed By: Gwyn Fernandez MD

## 2021-11-09 NOTE — PROGRESS NOTES
Cirilo Huerta is a 64 y.o. male follow up for mantle cell lymphoma. 1. Have you been to the ER, urgent care clinic since your last visit? Hospitalized since your last visit?no    2. Have you seen or consulted any other health care providers outside of the [address] [place] [address] since your last visit? Include any pap smears or colon screening.  No    Vitals 11/9/2021   Blood Pressure 141/79   Pulse 75   Temp 98.1   Resp 20   Height    Weight    SpO2 93   BSA    BMI

## 2021-11-09 NOTE — PROGRESS NOTES
[facility] Lab Visit:     Pt arrived ambulatory and in no distress, labs drawn per R port. Chest port accessed without difficulty, labs drawn and sent for processing. Port flushed, heparinized and deaccessed per protocol. Departed [facility] ambulatory and in no distress. Visit Vitals  BP (!) 141/79   Pulse 75   Temp 98.1 °F (36.7 °C)   Resp 20   SpO2 93%       Labs available in CC once resulted.

## 2021-12-21 NOTE — PROGRESS NOTES
[facility] [place] [facility] [place] [address]  [address] [facility], [facility]9 [address]  W: [phone]  F: [phone]      Reason for Visit:   Kamran Wilkinson is a 64 y.o. male who is seen for follow up of mantle cell lymphoma. Treatment History:   · CTA Chest 11/11/2016: PE in left lower lobe pulmonary arteries, extensive adneopathy  · CT A/P 11/12/2016: Cirrhosis, massive splenomegaly, ascites, massive lymphadenopathy  · US guided axillary node biopsy 11/14/2016: CD5+ B-cell non-hodgkin lymphoma, phenotype most consistent with mantle cell lymphoma. FISH with t(11;14)  · PET-CT 11/23/2016: Marked splenomegaly with increased metabolic activity consistent with lymphoma. Bilateral cervical and axillary adenopathy. Mediastinal and right hilar and left diaphragmatic adenopathy. Bilateral pelvic and inguinal adenopathy. Multiple small mesenteric and retroperitoneal nodes with low grade or no activity. · BMBx 11/29/2016: Hypercellular marrow involved by mantle cell lymphoma  · Stage IV Mantle Cell Lymphoma  · Palliative chemotherapy with Rituximab and Bendamustine x6 cycles from 12/8/2016 to 5/12/17  · Bone marrow biopsy 5/23/2017: no evidence of lymphoma  · PET/CT 6/7/2017: There has been marked improvement in the adenopathy in the cervical region, axilla, mediastinum, abdomen and pelvis which now demonstrate no increased metabolic activity. There has been marked decrease in the spleen which now demonstrates no increased metabolic activity. · CT C/A/P 7/9/2020: New enlarged bilateral axillary lymph nodes, left greater than right, and new enlarged left iliac chain and femoral lymph nodes, highly suspicious for lymphoma recurrence. · PET 0/10/8389: Hypermetabolic bilateral axillary, pelvic, and left inguinal lymph nodes. Hypermetabolic soft tissue nodules in the arms bilaterally, face, and left posterior neck.  Small but hypermetabolic left parotid nodule may represent an intraparotid lymph node. Single hypermetabolic focus in the right glenoid may represent an underlying osseous lesion. · US guided lymph biopsy of left axillary node on 8/5/2020 consistent with mantle cell lymphoma. · Bone marrow biopsy 8/5/2020 negative for involvement  · Initiated palliative therapy with Acalabrutinib 100mg BID 8/26/2020    History of Present Illness:   Doing well on Acalabrutinib. He is diong well on therapy and reports no missed doses. He is trying to walk more to help loose some weight. He denies fevers, chills, bleeding. He is unaccompanied today. Review of systems was obtained and pertinent findings reviewed above. Past medical history, social history, family history, medications, and allergies are located in the electronic medical record. Physical Exam:     Visit Vitals  BP (!) 145/70   Pulse 73   Resp 18   Ht 5' 11\" (1.803 m)   BMI 36.40 kg/m²     ECOG PS: 0  General: no distress  Eyes: anicteric sclerae  HENT: oropharynx clear  Neck: supple  Lymphatic: no cervical, supraclavicular adenopathy  Respiratory: normal respiratory effort  CV: no peripheral edema  GI: soft, nontender, nondistended, no masses  Skin: no rashes; no ecchymoses; no petechiae      Results:     Lab Results   Component Value Date/Time    WBC 5.3 12/30/2021 10:00 AM    HGB 10.4 (L) 12/30/2021 10:00 AM    HCT 32.9 (L) 12/30/2021 10:00 AM    PLATELET 56 (L) 47/37/2353 10:00 AM    MCV 97.1 12/30/2021 10:00 AM    ABS.  NEUTROPHILS 3.0 12/30/2021 10:00 AM    Hemoglobin (POC) 10.9 (L) 01/12/2017 08:02 AM    Hematocrit (POC) 32 (L) 01/12/2017 08:02 AM     Lab Results   Component Value Date/Time    Sodium 138 12/30/2021 10:00 AM    Potassium 4.4 12/30/2021 10:00 AM    Chloride 104 12/30/2021 10:00 AM    CO2 31 12/30/2021 10:00 AM    Glucose 135 (H) 12/30/2021 10:00 AM    BUN 19 12/30/2021 10:00 AM    Creatinine 1.07 12/30/2021 10:00 AM    GFR est AA >60 12/30/2021 10:00 AM    GFR est non-AA >60 12/30/2021 10:00 AM    Calcium 8.8 12/30/2021 10:00 AM    Sodium (POC) 143 01/12/2017 08:02 AM    Potassium (POC) 4.0 01/12/2017 08:02 AM    Chloride (POC) 104 01/12/2017 08:02 AM    Glucose (POC) 156 (H) 01/12/2017 08:02 AM    BUN (POC) 23 (H) 01/12/2017 08:02 AM    Creatinine (POC) 1.0 07/09/2020 09:37 AM    Calcium, ionized (POC) 1.19 01/12/2017 08:02 AM     Lab Results   Component Value Date/Time    Bilirubin, total 0.9 12/30/2021 10:00 AM    ALT (SGPT) 33 12/30/2021 10:00 AM    Alk. phosphatase 83 12/30/2021 10:00 AM    Protein, total 6.7 12/30/2021 10:00 AM    Albumin 3.4 (L) 12/30/2021 10:00 AM    Globulin 3.3 12/30/2021 10:00 AM     Lab Results   Component Value Date/Time    Reticulocyte count 1.8 11/10/2016 02:00 PM    Iron % saturation 11 (L) 11/10/2016 02:00 PM    TIBC 240 (L) 11/10/2016 02:00 PM    Ferritin 100 11/10/2016 02:00 PM    Vitamin B12 415 11/10/2016 02:00 PM    Folate 15.6 11/10/2016 02:00 PM    Haptoglobin 186 11/11/2016 12:04 PM     10/10/2019 10:27 AM    Beta-2 Microglobulin, serum 4.4 (H) 11/15/2016 05:21 AM    TSH 4.06 (H) 11/10/2016 11:21 AM    Hep C virus Ab Interp. NONREACTIVE 06/14/2018 10:09 AM     Lab Results   Component Value Date/Time    INR 1.0 06/14/2018 10:09 AM    aPTT 31.6 06/14/2018 10:09 AM    Fibrinogen 215 11/11/2016 12:04 PM       HepB/C negative  TTE 11/10/2016: EF 60%    Bone marrow biopsy 11/29/2016: Hypercellular marrow involved by mantle cell lymphoma    Bone marrow biopsy 5/23/17: no evidence of lymphoma    CT C/A/P 11/18/2020:  Slight decrease in size of left pelvic lymph nodes. No other change from the prior exam. Relatively stable left axillary and mediastinal lymph nodes    CT C/A/P 4/21/2021:  Mildly enlarged left axillary, mediastinal, and pelvic lymph nodes are stable or slightly decreased in size. Unchanged splenomegaly. CT C/A/P 8/9/2021:  Stable mild left axillary, mediastinal, and pelvic lymphadenopathy. No new  lymphadenopathy in the chest, abdomen, or pelvis.  Decreased mild splenomegaly. Assessment:   1) Mantle cell lymphoma, relapsed  Stage IV  He completed chemotherapy with R-Bendamustine in 5/2017 and then declined consolidative transplant or maintenance rituximab. Unfortunately, he now has relapsed disease confirmed with biopsy of left axillary node. He is currently on second line therapy with acalabrutinib beginning 8/2020. He is tolerating therapy well with manageable toxicity. However, his platelets have been steadily falling over the past few months, down to 56 today. He is becoming anemic as well now. I'm not sure if this is due to the treatment or to his lymphoma. I recommend repeating his CT now to assess for progression. Repeat labs next week. The patient will be seen every 6 weeks, and labs will be monitored to assess for toxicity from therapy. I previously offered referral to [facility] or [facility] for a second opinion and for discussion of trial options and/or CAR-T cell therapy, but he has declined for now. 2) DVT, PE  Diagnosed 11/11/2016. Malignancy associated. Previously on lovenox. Currently, on apixaban BID. I have recommended long-term anticoagulation. Will HOLD if plt < 50.     3) Thrombocytopenia  Intermittent, mild. Possibly from splenomegaly, possibly from lymphoma or acalabrutinib. Gr 2 today and worsening. Will repeat labs in 1 week and obtain CT to evaluate for potential cause. 4) Anemia  New on past few lab checks. Repeat labs next week. 5) Port care  Continue port flushes every 6 weeks when obtaining labs. He prefers to have labs through port. He received flu vaccine and COVID booster since last visit. Plan:     · Continue Acalabrutinib 100mg BID  · Continue apixiban 5mg PO BID  · Port flush every 6 weeks with labs (CBC, CMP)  · Repeat CBC and CMP in 1 week  · CT C/A/P ordered  · Return to see me with labs in 6 weeks    I have personally seen and evaluated the patient in conjunction with Radha Womack NP.  I find the patient's history and physical exam are consistent with the NP's documentation. I agree with the above assessment and plan, which I have modified as needed. Worsening thrombocytopenia is concerning, we will reevaluate his disease with CT and repeat labs next week.       Signed By: Benito Funez MD

## 2021-12-30 PROBLEM — D69.6 THROMBOCYTOPENIA, UNSPECIFIED (HCC): Status: ACTIVE | Noted: 2021-01-01

## 2021-12-30 NOTE — PROGRESS NOTES
[facility] Progress Note    Date: 2021    Name: Brenda Marte    MRN: [id number]         : 1945    Port flush     Visit Vitals  BP (!) 145/70   Pulse 73   Temp 97.9 °F (36.6 °C)   Resp 18       Mediport was accessed with 20G, 0.75inch guallpa(s) after chloroprep. R  chest, single    Blood return: yes     blood collected for labs per protocol. Flushed  NS followed by Heparin 500u    Guallpa needle(s) removed. Band-Aid applied. Mr. Maharaj tolerated the procedure, and had no complaints. Mr. Maharaj was discharged from [place] [address] in stable condition. He is to return on 2/10/22 for his next appointment.     [facility]  2021  11:51 AM

## 2021-12-30 NOTE — TELEPHONE ENCOUNTER
[facility]  [phone]    12/30/21-  reminded patient to have labs repeated at the lab beside our office next week.

## 2021-12-30 NOTE — TELEPHONE ENCOUNTER
Patient called and stated that he has scheduled his scan for 1/4 and would like to know if there is anything else he needs to do. Please advise.     CB# [phone]

## 2022-01-01 ENCOUNTER — APPOINTMENT (OUTPATIENT)
Dept: INFUSION THERAPY | Age: 77
End: 2022-01-01

## 2022-01-01 ENCOUNTER — HOSPITAL ENCOUNTER (OUTPATIENT)
Dept: CT IMAGING | Age: 77
Discharge: HOME | End: 2022-01-04
Attending: NURSE PRACTITIONER
Payer: MEDICARE

## 2022-01-01 ENCOUNTER — TELEPHONE (OUTPATIENT)
Dept: ONCOLOGY | Age: 77
End: 2022-01-01

## 2022-01-01 ENCOUNTER — HOSPITAL ENCOUNTER (OUTPATIENT)
Dept: CT IMAGING | Age: 77
Discharge: HOME | End: 2022-03-02
Attending: NURSE PRACTITIONER
Payer: MEDICARE

## 2022-01-01 ENCOUNTER — HOSPITAL ENCOUNTER (OUTPATIENT)
Dept: INFUSION THERAPY | Age: 77
Discharge: HOME | End: 2022-02-24
Payer: MEDICARE

## 2022-01-01 ENCOUNTER — HOSPITAL ENCOUNTER (OUTPATIENT)
Dept: INFUSION THERAPY | Age: 77
Discharge: HOME | End: 2022-03-01
Payer: MEDICARE

## 2022-01-01 ENCOUNTER — OFFICE VISIT (OUTPATIENT)
Dept: ONCOLOGY | Age: 77
End: 2022-01-01
Payer: MEDICARE

## 2022-01-01 ENCOUNTER — HOSPITAL ENCOUNTER (OUTPATIENT)
Dept: INFUSION THERAPY | Age: 77
Discharge: HOME | End: 2022-02-10
Payer: MEDICARE

## 2022-01-01 ENCOUNTER — HOSPITAL ENCOUNTER (OUTPATIENT)
Dept: INFUSION THERAPY | Age: 77
Discharge: HOME | End: 2022-02-17
Payer: MEDICARE

## 2022-01-01 ENCOUNTER — HOSPITAL ENCOUNTER (OUTPATIENT)
Dept: INFUSION THERAPY | Age: 77
Discharge: HOME | End: 2022-03-03
Payer: MEDICARE

## 2022-01-01 VITALS
SYSTOLIC BLOOD PRESSURE: 104 MMHG | OXYGEN SATURATION: 94 % | RESPIRATION RATE: 18 BRPM | TEMPERATURE: 98.7 F | DIASTOLIC BLOOD PRESSURE: 56 MMHG | HEART RATE: 80 BPM

## 2022-01-01 VITALS
RESPIRATION RATE: 20 BRPM | HEART RATE: 72 BPM | BODY MASS INDEX: 36.01 KG/M2 | WEIGHT: 257.2 LBS | SYSTOLIC BLOOD PRESSURE: 130 MMHG | HEIGHT: 71 IN | DIASTOLIC BLOOD PRESSURE: 62 MMHG | TEMPERATURE: 98.6 F | OXYGEN SATURATION: 94 %

## 2022-01-01 VITALS
HEART RATE: 77 BPM | SYSTOLIC BLOOD PRESSURE: 96 MMHG | DIASTOLIC BLOOD PRESSURE: 65 MMHG | TEMPERATURE: 97.7 F | RESPIRATION RATE: 20 BRPM | OXYGEN SATURATION: 94 %

## 2022-01-01 VITALS
BODY MASS INDEX: 36.12 KG/M2 | HEIGHT: 71 IN | SYSTOLIC BLOOD PRESSURE: 123 MMHG | OXYGEN SATURATION: 93 % | TEMPERATURE: 98.1 F | HEART RATE: 81 BPM | WEIGHT: 258 LBS | DIASTOLIC BLOOD PRESSURE: 60 MMHG | RESPIRATION RATE: 20 BRPM

## 2022-01-01 VITALS
TEMPERATURE: 97.8 F | SYSTOLIC BLOOD PRESSURE: 108 MMHG | RESPIRATION RATE: 20 BRPM | DIASTOLIC BLOOD PRESSURE: 56 MMHG | OXYGEN SATURATION: 98 % | HEART RATE: 78 BPM

## 2022-01-01 VITALS
RESPIRATION RATE: 20 BRPM | OXYGEN SATURATION: 93 % | HEART RATE: 81 BPM | WEIGHT: 258.1 LBS | HEIGHT: 71 IN | DIASTOLIC BLOOD PRESSURE: 60 MMHG | TEMPERATURE: 98.1 F | BODY MASS INDEX: 36.13 KG/M2 | SYSTOLIC BLOOD PRESSURE: 123 MMHG

## 2022-01-01 VITALS
SYSTOLIC BLOOD PRESSURE: 111 MMHG | HEART RATE: 82 BPM | RESPIRATION RATE: 27 BRPM | OXYGEN SATURATION: 92 % | DIASTOLIC BLOOD PRESSURE: 56 MMHG

## 2022-01-01 DIAGNOSIS — D69.6 THROMBOCYTOPENIA (HCC): ICD-10-CM

## 2022-01-01 DIAGNOSIS — C83.18 MANTLE CELL LYMPHOMA OF LYMPH NODES OF MULTIPLE REGIONS (HCC): ICD-10-CM

## 2022-01-01 DIAGNOSIS — C83.18 MANTLE CELL LYMPHOMA OF LYMPH NODES OF MULTIPLE REGIONS (HCC): Primary | ICD-10-CM

## 2022-01-01 DIAGNOSIS — I82.403: ICD-10-CM

## 2022-01-01 DIAGNOSIS — D64.9 SEVERE ANEMIA: ICD-10-CM

## 2022-01-01 DIAGNOSIS — I26.99 OTHER ACUTE PULMONARY EMBOLISM WITHOUT ACUTE COR PULMONALE (HCC): ICD-10-CM

## 2022-01-01 LAB
ABO + RH BLD: NORMAL
ALBUMIN SERPL-MCNC: 3.4 G/DL (ref 3.5–5)
ALBUMIN/GLOB SERPL: 1.1 {RATIO} (ref 1.1–2.2)
ALP SERPL-CCNC: 75 U/L (ref 45–117)
ALT SERPL-CCNC: 33 U/L (ref 12–78)
ANION GAP SERPL CALC-SCNC: 6 MMOL/L (ref 5–15)
ANION GAP SERPL CALC-SCNC: 7 MMOL/L (ref 5–15)
AST SERPL-CCNC: 13 U/L (ref 15–37)
BASOPHILS # BLD: 0 K/UL (ref 0–0.1)
BASOPHILS NFR BLD: 0 % (ref 0–1)
BASOPHILS NFR BLD: 1 % (ref 0–1)
BASOPHILS NFR BLD: 1 % (ref 0–1)
BILIRUB SERPL-MCNC: 0.8 MG/DL (ref 0.2–1)
BLD PROD TYP BPU: NORMAL
BLOOD GROUP ANTIBODIES SERPL: NORMAL
BPU ID: NORMAL
BUN SERPL-MCNC: 29 MG/DL (ref 6–20)
BUN SERPL-MCNC: 34 MG/DL (ref 6–20)
BUN/CREAT SERPL: 22 (ref 12–20)
BUN/CREAT SERPL: 23 (ref 12–20)
CALCIUM SERPL-MCNC: 8.5 MG/DL (ref 8.5–10.1)
CALCIUM SERPL-MCNC: 8.5 MG/DL (ref 8.5–10.1)
CHLORIDE SERPL-SCNC: 100 MMOL/L (ref 97–108)
CHLORIDE SERPL-SCNC: 105 MMOL/L (ref 97–108)
CO2 SERPL-SCNC: 28 MMOL/L (ref 21–32)
CO2 SERPL-SCNC: 29 MMOL/L (ref 21–32)
CREAT SERPL-MCNC: 1.3 MG/DL (ref 0.7–1.3)
CREAT SERPL-MCNC: 1.51 MG/DL (ref 0.7–1.3)
CROSSMATCH RESULT,%XM: NORMAL
DAT POLY-SP REAG RBC QL: NORMAL
DIFFERENTIAL METHOD BLD: ABNORMAL
EOSINOPHIL # BLD: 0 K/UL (ref 0–0.4)
EOSINOPHIL NFR BLD: 0 % (ref 0–7)
EOSINOPHIL NFR BLD: 1 % (ref 0–7)
ERYTHROCYTE [DISTWIDTH] IN BLOOD BY AUTOMATED COUNT: 19.7 % (ref 11.5–14.5)
ERYTHROCYTE [DISTWIDTH] IN BLOOD BY AUTOMATED COUNT: 19.7 % (ref 11.5–14.5)
ERYTHROCYTE [DISTWIDTH] IN BLOOD BY AUTOMATED COUNT: 20.3 % (ref 11.5–14.5)
ERYTHROCYTE [DISTWIDTH] IN BLOOD BY AUTOMATED COUNT: 20.4 % (ref 11.5–14.5)
ERYTHROCYTE [DISTWIDTH] IN BLOOD BY AUTOMATED COUNT: 20.4 % (ref 11.5–14.5)
ERYTHROCYTE [DISTWIDTH] IN BLOOD BY AUTOMATED COUNT: 21.3 % (ref 11.5–14.5)
FERRITIN SERPL-MCNC: 386 NG/ML (ref 26–388)
FOLATE SERPL-MCNC: 14.3 NG/ML (ref 5–21)
GLOBULIN SER CALC-MCNC: 3 G/DL (ref 2–4)
GLUCOSE SERPL-MCNC: 145 MG/DL (ref 65–100)
GLUCOSE SERPL-MCNC: 196 MG/DL (ref 65–100)
HAPTOGLOB SERPL-MCNC: 189 MG/DL (ref 30–200)
HCT VFR BLD AUTO: 18.9 % (ref 36.6–50.3)
HCT VFR BLD AUTO: 19.7 % (ref 36.6–50.3)
HCT VFR BLD AUTO: 20.3 % (ref 36.6–50.3)
HCT VFR BLD AUTO: 21.1 % (ref 36.6–50.3)
HCT VFR BLD AUTO: 23.4 % (ref 36.6–50.3)
HCT VFR BLD AUTO: 28.3 % (ref 36.6–50.3)
HGB BLD-MCNC: 6.2 G/DL (ref 12.1–17)
HGB BLD-MCNC: 6.3 G/DL (ref 12.1–17)
HGB BLD-MCNC: 6.7 G/DL (ref 12.1–17)
HGB BLD-MCNC: 6.7 G/DL (ref 12.1–17)
HGB BLD-MCNC: 7.5 G/DL (ref 12.1–17)
HGB BLD-MCNC: 8.7 G/DL (ref 12.1–17)
HISTORY CHECKED?,CKHIST: NORMAL
IMM GRANULOCYTES # BLD AUTO: 0 K/UL
IMM GRANULOCYTES NFR BLD AUTO: 0 %
IRON SATN MFR SERPL: 63 % (ref 20–50)
IRON SERPL-MCNC: 116 UG/DL (ref 35–150)
LDH SERPL L TO P-CCNC: 280 U/L (ref 85–241)
LYMPHOCYTES # BLD: 0.6 K/UL (ref 0.8–3.5)
LYMPHOCYTES # BLD: 0.9 K/UL (ref 0.8–3.5)
LYMPHOCYTES # BLD: 0.9 K/UL (ref 0.8–3.5)
LYMPHOCYTES # BLD: 1 K/UL (ref 0.8–3.5)
LYMPHOCYTES # BLD: 1.2 K/UL (ref 0.8–3.5)
LYMPHOCYTES # BLD: 1.3 K/UL (ref 0.8–3.5)
LYMPHOCYTES NFR BLD: 20 % (ref 12–49)
LYMPHOCYTES NFR BLD: 23 % (ref 12–49)
LYMPHOCYTES NFR BLD: 25 % (ref 12–49)
LYMPHOCYTES NFR BLD: 27 % (ref 12–49)
LYMPHOCYTES NFR BLD: 31 % (ref 12–49)
LYMPHOCYTES NFR BLD: 32 % (ref 12–49)
MCH RBC QN AUTO: 28.9 PG (ref 26–34)
MCH RBC QN AUTO: 29.1 PG (ref 26–34)
MCH RBC QN AUTO: 29.2 PG (ref 26–34)
MCH RBC QN AUTO: 29.4 PG (ref 26–34)
MCH RBC QN AUTO: 29.4 PG (ref 26–34)
MCH RBC QN AUTO: 29.5 PG (ref 26–34)
MCHC RBC AUTO-ENTMCNC: 30.7 G/DL (ref 30–36.5)
MCHC RBC AUTO-ENTMCNC: 31.8 G/DL (ref 30–36.5)
MCHC RBC AUTO-ENTMCNC: 32 G/DL (ref 30–36.5)
MCHC RBC AUTO-ENTMCNC: 32.1 G/DL (ref 30–36.5)
MCHC RBC AUTO-ENTMCNC: 32.8 G/DL (ref 30–36.5)
MCHC RBC AUTO-ENTMCNC: 33 G/DL (ref 30–36.5)
MCV RBC AUTO: 88.7 FL (ref 80–99)
MCV RBC AUTO: 89.4 FL (ref 80–99)
MCV RBC AUTO: 90.9 FL (ref 80–99)
MCV RBC AUTO: 91.2 FL (ref 80–99)
MCV RBC AUTO: 91.8 FL (ref 80–99)
MCV RBC AUTO: 95.6 FL (ref 80–99)
METAMYELOCYTES NFR BLD MANUAL: 1 %
METAMYELOCYTES NFR BLD MANUAL: 1 %
METAMYELOCYTES NFR BLD MANUAL: 2 %
METAMYELOCYTES NFR BLD MANUAL: 3 %
METAMYELOCYTES NFR BLD MANUAL: 3 %
MONOCYTES # BLD: 0.1 K/UL (ref 0–1)
MONOCYTES # BLD: 0.1 K/UL (ref 0–1)
MONOCYTES # BLD: 0.3 K/UL (ref 0–1)
MONOCYTES # BLD: 0.3 K/UL (ref 0–1)
MONOCYTES # BLD: 0.4 K/UL (ref 0–1)
MONOCYTES # BLD: 0.5 K/UL (ref 0–1)
MONOCYTES NFR BLD: 13 % (ref 5–13)
MONOCYTES NFR BLD: 2 % (ref 5–13)
MONOCYTES NFR BLD: 4 % (ref 5–13)
MONOCYTES NFR BLD: 8 % (ref 5–13)
MONOCYTES NFR BLD: 8 % (ref 5–13)
MONOCYTES NFR BLD: 9 % (ref 5–13)
MYELOCYTES NFR BLD MANUAL: 2 %
MYELOCYTES NFR BLD MANUAL: 3 %
NEUTS BAND NFR BLD MANUAL: 1 % (ref 0–6)
NEUTS BAND NFR BLD MANUAL: 1 % (ref 0–6)
NEUTS BAND NFR BLD MANUAL: 2 % (ref 0–6)
NEUTS BAND NFR BLD MANUAL: 4 % (ref 0–6)
NEUTS BAND NFR BLD MANUAL: 6 % (ref 0–6)
NEUTS BAND NFR BLD MANUAL: 9 % (ref 0–6)
NEUTS SEG # BLD: 1.8 K/UL (ref 1.8–8)
NEUTS SEG # BLD: 2.2 K/UL (ref 1.8–8)
NEUTS SEG # BLD: 2.3 K/UL (ref 1.8–8)
NEUTS SEG # BLD: 2.3 K/UL (ref 1.8–8)
NEUTS SEG # BLD: 2.5 K/UL (ref 1.8–8)
NEUTS SEG # BLD: 3 K/UL (ref 1.8–8)
NEUTS SEG NFR BLD: 48 % (ref 32–75)
NEUTS SEG NFR BLD: 57 % (ref 32–75)
NEUTS SEG NFR BLD: 58 % (ref 32–75)
NEUTS SEG NFR BLD: 59 % (ref 32–75)
NEUTS SEG NFR BLD: 61 % (ref 32–75)
NEUTS SEG NFR BLD: 74 % (ref 32–75)
NRBC # BLD: 0 K/UL (ref 0–0.01)
NRBC BLD-RTO: 0 PER 100 WBC
PLATELET # BLD AUTO: 11 K/UL (ref 150–400)
PLATELET # BLD AUTO: 15 K/UL (ref 150–400)
PLATELET # BLD AUTO: 15 K/UL (ref 150–400)
PLATELET # BLD AUTO: 21 K/UL (ref 150–400)
PLATELET # BLD AUTO: 28 K/UL (ref 150–400)
PLATELET # BLD AUTO: 9 K/UL (ref 150–400)
PLATELET COMMENTS,PCOM: ABNORMAL
PLATELET COMMENTS,PCOM: ABNORMAL
PMV BLD AUTO: 10.1 FL (ref 8.9–12.9)
PMV BLD AUTO: ABNORMAL FL (ref 8.9–12.9)
POTASSIUM SERPL-SCNC: 3.9 MMOL/L (ref 3.5–5.1)
POTASSIUM SERPL-SCNC: 4.3 MMOL/L (ref 3.5–5.1)
PROT SERPL-MCNC: 6.4 G/DL (ref 6.4–8.2)
RBC # BLD AUTO: 2.13 M/UL (ref 4.1–5.7)
RBC # BLD AUTO: 2.16 M/UL (ref 4.1–5.7)
RBC # BLD AUTO: 2.27 M/UL (ref 4.1–5.7)
RBC # BLD AUTO: 2.32 M/UL (ref 4.1–5.7)
RBC # BLD AUTO: 2.55 M/UL (ref 4.1–5.7)
RBC # BLD AUTO: 2.96 M/UL (ref 4.1–5.7)
RBC MORPH BLD: ABNORMAL
SODIUM SERPL-SCNC: 135 MMOL/L (ref 136–145)
SODIUM SERPL-SCNC: 140 MMOL/L (ref 136–145)
SPECIMEN EXP DATE BLD: NORMAL
STATUS OF UNIT,%ST: NORMAL
TIBC SERPL-MCNC: 183 UG/DL (ref 250–450)
UNIT DIVISION, %UDIV: 0
VIT B12 SERPL-MCNC: 553 PG/ML (ref 193–986)
WBC # BLD AUTO: 3.1 K/UL (ref 4.1–11.1)
WBC # BLD AUTO: 3.1 K/UL (ref 4.1–11.1)
WBC # BLD AUTO: 3.6 K/UL (ref 4.1–11.1)
WBC # BLD AUTO: 3.9 K/UL (ref 4.1–11.1)
WBC # BLD AUTO: 4.1 K/UL (ref 4.1–11.1)
WBC # BLD AUTO: 4.8 K/UL (ref 4.1–11.1)
WBC MORPH BLD: ABNORMAL
WBC MORPH BLD: ABNORMAL

## 2022-01-01 PROCEDURE — 74011000250 HC RX REV CODE- 250: Performed by: NURSE PRACTITIONER

## 2022-01-01 PROCEDURE — 77030028872 HC BN BIOP NDL ON CNTRL TY TELE -C

## 2022-01-01 PROCEDURE — 88305 TISSUE EXAM BY PATHOLOGIST: CPT

## 2022-01-01 PROCEDURE — 74011250636 HC RX REV CODE- 250/636: Performed by: NURSE PRACTITIONER

## 2022-01-01 PROCEDURE — 85025 COMPLETE CBC W/AUTO DIFF WBC: CPT

## 2022-01-01 PROCEDURE — 88185 FLOWCYTOMETRY/TC ADD-ON: CPT

## 2022-01-01 PROCEDURE — 36415 COLL VENOUS BLD VENIPUNCTURE: CPT

## 2022-01-01 PROCEDURE — G8427 DOCREV CUR MEDS BY ELIG CLIN: HCPCS | Performed by: INTERNAL MEDICINE

## 2022-01-01 PROCEDURE — 38221 DX BONE MARROW BIOPSIES: CPT

## 2022-01-01 PROCEDURE — 86900 BLOOD TYPING SEROLOGIC ABO: CPT

## 2022-01-01 PROCEDURE — 99152 MOD SED SAME PHYS/QHP 5/>YRS: CPT

## 2022-01-01 PROCEDURE — 74011000250 HC RX REV CODE- 250: Performed by: RADIOLOGY

## 2022-01-01 PROCEDURE — 80053 COMPREHEN METABOLIC PANEL: CPT

## 2022-01-01 PROCEDURE — 36591 DRAW BLOOD OFF VENOUS DEVICE: CPT

## 2022-01-01 PROCEDURE — 88360 TUMOR IMMUNOHISTOCHEM/MANUAL: CPT

## 2022-01-01 PROCEDURE — 77030003560 HC NDL HUBR BARD -A

## 2022-01-01 PROCEDURE — 83010 ASSAY OF HAPTOGLOBIN QUANT: CPT

## 2022-01-01 PROCEDURE — 83615 LACTATE (LD) (LDH) ENZYME: CPT

## 2022-01-01 PROCEDURE — 74177 CT ABD & PELVIS W/CONTRAST: CPT

## 2022-01-01 PROCEDURE — 77030016057 HC NDL HUBR APOL -B

## 2022-01-01 PROCEDURE — 36430 TRANSFUSION BLD/BLD COMPNT: CPT

## 2022-01-01 PROCEDURE — 74011250636 HC RX REV CODE- 250/636: Performed by: STUDENT IN AN ORGANIZED HEALTH CARE EDUCATION/TRAINING PROGRAM

## 2022-01-01 PROCEDURE — 86923 COMPATIBILITY TEST ELECTRIC: CPT

## 2022-01-01 PROCEDURE — 74011000636 HC RX REV CODE- 636: Performed by: STUDENT IN AN ORGANIZED HEALTH CARE EDUCATION/TRAINING PROGRAM

## 2022-01-01 PROCEDURE — 82607 VITAMIN B-12: CPT

## 2022-01-01 PROCEDURE — 88311 DECALCIFY TISSUE: CPT

## 2022-01-01 PROCEDURE — 88313 SPECIAL STAINS GROUP 2: CPT

## 2022-01-01 PROCEDURE — 77030013169 SET IV BLD ICUM -A

## 2022-01-01 PROCEDURE — G8536 NO DOC ELDER MAL SCRN: HCPCS | Performed by: INTERNAL MEDICINE

## 2022-01-01 PROCEDURE — G8417 CALC BMI ABV UP PARAM F/U: HCPCS | Performed by: INTERNAL MEDICINE

## 2022-01-01 PROCEDURE — 88342 IMHCHEM/IMCYTCHM 1ST ANTB: CPT

## 2022-01-01 PROCEDURE — 88237 TISSUE CULTURE BONE MARROW: CPT

## 2022-01-01 PROCEDURE — P9073 PLATELETS PHERESIS PATH REDU: HCPCS

## 2022-01-01 PROCEDURE — 88341 IMHCHEM/IMCYTCHM EA ADD ANTB: CPT

## 2022-01-01 PROCEDURE — G9717 DOC PT DX DEP/BP F/U NT REQ: HCPCS | Performed by: INTERNAL MEDICINE

## 2022-01-01 PROCEDURE — 88184 FLOWCYTOMETRY/ TC 1 MARKER: CPT

## 2022-01-01 PROCEDURE — P9016 RBC LEUKOCYTES REDUCED: HCPCS

## 2022-01-01 PROCEDURE — G0463 HOSPITAL OUTPT CLINIC VISIT: HCPCS | Performed by: NURSE PRACTITIONER

## 2022-01-01 PROCEDURE — 80048 BASIC METABOLIC PNL TOTAL CA: CPT

## 2022-01-01 PROCEDURE — 82746 ASSAY OF FOLIC ACID SERUM: CPT

## 2022-01-01 PROCEDURE — 1101F PT FALLS ASSESS-DOCD LE1/YR: CPT | Performed by: INTERNAL MEDICINE

## 2022-01-01 PROCEDURE — 88374 M/PHMTRC ALYS ISHQUANT/SEMIQ: CPT

## 2022-01-01 PROCEDURE — 86880 COOMBS TEST DIRECT: CPT

## 2022-01-01 PROCEDURE — 77030014115

## 2022-01-01 PROCEDURE — 82728 ASSAY OF FERRITIN: CPT

## 2022-01-01 PROCEDURE — 99215 OFFICE O/P EST HI 40 MIN: CPT | Performed by: INTERNAL MEDICINE

## 2022-01-01 PROCEDURE — 83540 ASSAY OF IRON: CPT

## 2022-01-01 PROCEDURE — 88264 CHROMOSOME ANALYSIS 20-25: CPT

## 2022-01-01 RX ORDER — SODIUM CHLORIDE 9 MG/ML
250 INJECTION, SOLUTION INTRAVENOUS AS NEEDED
Status: DISCONTINUED | OUTPATIENT
Start: 2022-01-01 | End: 2022-01-01 | Stop reason: HOSPADM

## 2022-01-01 RX ORDER — HEPARIN 100 UNIT/ML
500 SYRINGE INTRAVENOUS AS NEEDED
Status: CANCELLED | OUTPATIENT
Start: 2022-01-01

## 2022-01-01 RX ORDER — SODIUM CHLORIDE 9 MG/ML
250 INJECTION, SOLUTION INTRAVENOUS AS NEEDED
Status: DISPENSED | OUTPATIENT
Start: 2022-01-01 | End: 2022-01-01

## 2022-01-01 RX ORDER — SODIUM CHLORIDE 9 MG/ML
10 INJECTION INTRAMUSCULAR; INTRAVENOUS; SUBCUTANEOUS AS NEEDED
Status: CANCELLED | OUTPATIENT
Start: 2022-05-05

## 2022-01-01 RX ORDER — LIDOCAINE HYDROCHLORIDE 10 MG/ML
5 INJECTION, SOLUTION EPIDURAL; INFILTRATION; INTRACAUDAL; PERINEURAL
Status: COMPLETED | OUTPATIENT
Start: 2022-01-01 | End: 2022-01-01

## 2022-01-01 RX ORDER — SODIUM CHLORIDE 0.9 % (FLUSH) 0.9 %
5-10 SYRINGE (ML) INJECTION AS NEEDED
Status: CANCELLED | OUTPATIENT
Start: 2022-01-01

## 2022-01-01 RX ORDER — HEPARIN 100 UNIT/ML
500 SYRINGE INTRAVENOUS AS NEEDED
Status: ACTIVE | OUTPATIENT
Start: 2022-01-01 | End: 2022-01-01

## 2022-01-01 RX ORDER — SODIUM CHLORIDE 9 MG/ML
10 INJECTION INTRAMUSCULAR; INTRAVENOUS; SUBCUTANEOUS AS NEEDED
Status: ACTIVE | OUTPATIENT
Start: 2022-01-01 | End: 2022-01-01

## 2022-01-01 RX ORDER — SODIUM CHLORIDE 0.9 % (FLUSH) 0.9 %
5-10 SYRINGE (ML) INJECTION AS NEEDED
Status: CANCELLED | OUTPATIENT
Start: 2022-05-05

## 2022-01-01 RX ORDER — FENTANYL CITRATE 50 UG/ML
25-100 INJECTION, SOLUTION INTRAMUSCULAR; INTRAVENOUS
Status: DISCONTINUED | OUTPATIENT
Start: 2022-01-01 | End: 2022-01-01

## 2022-01-01 RX ORDER — MIDAZOLAM HYDROCHLORIDE 1 MG/ML
.5-5 INJECTION, SOLUTION INTRAMUSCULAR; INTRAVENOUS
Status: DISCONTINUED | OUTPATIENT
Start: 2022-01-01 | End: 2022-01-01

## 2022-01-01 RX ORDER — LIDOCAINE HYDROCHLORIDE 10 MG/ML
5 INJECTION, SOLUTION EPIDURAL; INFILTRATION; INTRACAUDAL; PERINEURAL
Status: DISPENSED | OUTPATIENT
Start: 2022-01-01 | End: 2022-01-01

## 2022-01-01 RX ORDER — HEPARIN 100 UNIT/ML
300 SYRINGE INTRAVENOUS AS NEEDED
Status: DISCONTINUED | OUTPATIENT
Start: 2022-01-01 | End: 2022-01-01

## 2022-01-01 RX ORDER — SODIUM CHLORIDE 0.9 % (FLUSH) 0.9 %
5-10 SYRINGE (ML) INJECTION AS NEEDED
Status: CANCELLED | OUTPATIENT
Start: 2022-03-24

## 2022-01-01 RX ORDER — SODIUM CHLORIDE 0.9 % (FLUSH) 0.9 %
5-10 SYRINGE (ML) INJECTION AS NEEDED
Status: DISPENSED | OUTPATIENT
Start: 2022-01-01 | End: 2022-01-01

## 2022-01-01 RX ORDER — SODIUM CHLORIDE 9 MG/ML
10 INJECTION INTRAMUSCULAR; INTRAVENOUS; SUBCUTANEOUS AS NEEDED
Status: CANCELLED | OUTPATIENT
Start: 2022-03-24

## 2022-01-01 RX ORDER — PREDNISONE 20 MG/1
80 TABLET ORAL DAILY
Qty: 90 TABLET | Refills: 0 | Status: SHIPPED | OUTPATIENT
Start: 2022-01-01

## 2022-01-01 RX ORDER — SODIUM CHLORIDE 9 MG/ML
10 INJECTION INTRAMUSCULAR; INTRAVENOUS; SUBCUTANEOUS AS NEEDED
Status: CANCELLED | OUTPATIENT
Start: 2022-01-01

## 2022-01-01 RX ORDER — PREDNISONE 20 MG/1
80 TABLET ORAL
Qty: 120 TABLET | Refills: 0 | Status: SHIPPED | OUTPATIENT
Start: 2022-01-01 | End: 2022-01-01

## 2022-01-01 RX ORDER — HEPARIN 100 UNIT/ML
500 SYRINGE INTRAVENOUS AS NEEDED
Status: CANCELLED | OUTPATIENT
Start: 2022-05-05

## 2022-01-01 RX ORDER — HEPARIN 100 UNIT/ML
500 SYRINGE INTRAVENOUS AS NEEDED
Status: CANCELLED | OUTPATIENT
Start: 2022-03-24

## 2022-01-01 RX ORDER — SODIUM CHLORIDE 9 MG/ML
25 INJECTION, SOLUTION INTRAVENOUS AS NEEDED
Status: DISPENSED | OUTPATIENT
Start: 2022-01-01 | End: 2022-01-01

## 2022-01-01 RX ADMIN — SODIUM CHLORIDE, PRESERVATIVE FREE 10 ML: 5 INJECTION INTRAVENOUS at 15:23

## 2022-01-01 RX ADMIN — IOPAMIDOL 100 ML: 755 INJECTION, SOLUTION INTRAVENOUS at 07:26

## 2022-01-01 RX ADMIN — SODIUM CHLORIDE, PRESERVATIVE FREE 10 ML: 5 INJECTION INTRAVENOUS at 15:25

## 2022-01-01 RX ADMIN — HEPARIN 500 UNITS: 100 SYRINGE at 10:40

## 2022-01-01 RX ADMIN — MIDAZOLAM 1 MG: 1 INJECTION INTRAMUSCULAR; INTRAVENOUS at 10:57

## 2022-01-01 RX ADMIN — SODIUM CHLORIDE 25 ML/HR: 9 INJECTION, SOLUTION INTRAVENOUS at 12:46

## 2022-01-01 RX ADMIN — MIDAZOLAM 1 MG: 1 INJECTION INTRAMUSCULAR; INTRAVENOUS at 10:52

## 2022-01-01 RX ADMIN — FENTANYL CITRATE 25 MCG: 50 INJECTION, SOLUTION INTRAMUSCULAR; INTRAVENOUS at 10:52

## 2022-01-01 RX ADMIN — SODIUM CHLORIDE 250 ML: 9 INJECTION, SOLUTION INTRAVENOUS at 13:00

## 2022-01-01 RX ADMIN — Medication 10 ML: at 10:40

## 2022-01-01 RX ADMIN — Medication 10 ML: at 10:25

## 2022-01-01 RX ADMIN — HEPARIN 500 UNITS: 100 SYRINGE at 10:25

## 2022-01-01 RX ADMIN — Medication 500 UNITS: at 15:23

## 2022-01-01 RX ADMIN — Medication 300 UNITS: at 12:01

## 2022-01-01 RX ADMIN — LIDOCAINE HYDROCHLORIDE 5 ML: 10 INJECTION, SOLUTION EPIDURAL; INFILTRATION; INTRACAUDAL; PERINEURAL at 10:33

## 2022-01-01 RX ADMIN — SODIUM CHLORIDE 250 ML: 9 INJECTION, SOLUTION INTRAVENOUS at 12:44

## 2022-01-01 RX ADMIN — Medication 500 UNITS: at 15:25

## 2022-01-01 RX ADMIN — FENTANYL CITRATE 25 MCG: 50 INJECTION, SOLUTION INTRAMUSCULAR; INTRAVENOUS at 11:00

## 2022-02-10 NOTE — PROGRESS NOTES
[facility] [place] at [address], [address] [facility]  W: [phone]  F: [phone]      Reason for Visit:   Arlette Iglesias is a 64 y.o. male who is seen for follow up of mantle cell lymphoma. Treatment History:   · CTA Chest 11/11/2016: PE in left lower lobe pulmonary arteries, extensive adneopathy  · CT A/P 11/12/2016: Cirrhosis, massive splenomegaly, ascites, massive lymphadenopathy  · US guided axillary node biopsy 11/14/2016: CD5+ B-cell non-hodgkin lymphoma, phenotype most consistent with mantle cell lymphoma. FISH with t(11;14)  · PET-CT 11/23/2016: Marked splenomegaly with increased metabolic activity consistent with lymphoma. Bilateral cervical and axillary adenopathy. Mediastinal and right hilar and left diaphragmatic adenopathy. Bilateral pelvic and inguinal adenopathy. Multiple small mesenteric and retroperitoneal nodes with low grade or no activity. · BMBx 11/29/2016: Hypercellular marrow involved by mantle cell lymphoma  · Stage IV Mantle Cell Lymphoma  · Palliative chemotherapy with Rituximab and Bendamustine x6 cycles from 12/8/2016 to 5/12/17  · Bone marrow biopsy 5/23/2017: no evidence of lymphoma  · PET/CT 6/7/2017: There has been marked improvement in the adenopathy in the cervical region, axilla, mediastinum, abdomen and pelvis which now demonstrate no increased metabolic activity. There has been marked decrease in the spleen which now demonstrates no increased metabolic activity. · CT C/A/P 7/9/2020: New enlarged bilateral axillary lymph nodes, left greater than right, and new enlarged left iliac chain and femoral lymph nodes, highly suspicious for lymphoma recurrence. · PET 8/39/5470: Hypermetabolic bilateral axillary, pelvic, and left inguinal lymph nodes. Hypermetabolic soft tissue nodules in the arms bilaterally, face, and left posterior neck.  Small but hypermetabolic left parotid nodule may represent an intraparotid lymph node. Single hypermetabolic focus in the right glenoid may represent an underlying osseous lesion. · US guided lymph biopsy of left axillary node on 8/5/2020 consistent with mantle cell lymphoma. · Bone marrow biopsy 8/5/2020 negative for involvement  · Initiated palliative therapy with Acalabrutinib 100mg BID 8/26/2020    History of Present Illness:   Feeling well overall. Continues with some arthritis pain, this is stable for him. No new aches or pains. No fever or chills. No night sweats. Appetite has been normal. Eating and drinking well. No change to bowels. He is unaccompanied today. Review of systems was obtained and pertinent findings reviewed above. Past medical history, social history, family history, medications, and allergies are located in the electronic medical record. Physical Exam:     Visit Vitals  /60   Pulse 81   Temp 98.1 °F (36.7 °C)   Resp 20   Ht 5' 11\" (1.803 m)   Wt 258 lb (117 kg)   SpO2 93%   BMI 35.98 kg/m²     ECOG PS: 0  General: no distress  Eyes: anicteric sclerae  HENT: oropharynx clear  Neck: supple  Lymphatic: no cervical, supraclavicular adenopathy  Respiratory: normal respiratory effort  CV: no peripheral edema  GI: soft, nontender, nondistended, no masses  Skin: no rashes; no ecchymoses; no petechiae      Results:     Lab Results   Component Value Date/Time    WBC 3.9 (L) 02/10/2022 10:48 AM    HGB 8.7 (L) 02/10/2022 10:48 AM    HCT 28.3 (L) 02/10/2022 10:48 AM    PLATELET 28 (LL) 72/45/1180 10:48 AM    MCV 95.6 02/10/2022 10:48 AM    ABS.  NEUTROPHILS PENDING 02/10/2022 10:48 AM    Hemoglobin (POC) 10.9 (L) 01/12/2017 08:02 AM    Hematocrit (POC) 32 (L) 01/12/2017 08:02 AM     Lab Results   Component Value Date/Time    Sodium 140 02/10/2022 10:48 AM    Potassium 4.3 02/10/2022 10:48 AM    Chloride 105 02/10/2022 10:48 AM    CO2 28 02/10/2022 10:48 AM    Glucose 196 (H) 02/10/2022 10:48 AM    BUN 29 (H) 02/10/2022 10:48 AM    Creatinine 1.30 02/10/2022 10:48 AM    GFR est AA >60 02/10/2022 10:48 AM    GFR est non-AA 53 (L) 02/10/2022 10:48 AM    Calcium 8.5 02/10/2022 10:48 AM    Sodium (POC) 143 01/12/2017 08:02 AM    Potassium (POC) 4.0 01/12/2017 08:02 AM    Chloride (POC) 104 01/12/2017 08:02 AM    Glucose (POC) 156 (H) 01/12/2017 08:02 AM    BUN (POC) 23 (H) 01/12/2017 08:02 AM    Creatinine (POC) 1.0 07/09/2020 09:37 AM    Calcium, ionized (POC) 1.19 01/12/2017 08:02 AM     Lab Results   Component Value Date/Time    Bilirubin, total 0.8 02/10/2022 10:48 AM    ALT (SGPT) 33 02/10/2022 10:48 AM    Alk. phosphatase 75 02/10/2022 10:48 AM    Protein, total 6.4 02/10/2022 10:48 AM    Albumin 3.4 (L) 02/10/2022 10:48 AM    Globulin 3.0 02/10/2022 10:48 AM     Lab Results   Component Value Date/Time    Reticulocyte count 1.8 11/10/2016 02:00 PM    Iron % saturation 11 (L) 11/10/2016 02:00 PM    TIBC 240 (L) 11/10/2016 02:00 PM    Ferritin 100 11/10/2016 02:00 PM    Vitamin B12 415 11/10/2016 02:00 PM    Folate 15.6 11/10/2016 02:00 PM    Haptoglobin 186 11/11/2016 12:04 PM     10/10/2019 10:27 AM    Beta-2 Microglobulin, serum 4.4 (H) 11/15/2016 05:21 AM    TSH 4.06 (H) 11/10/2016 11:21 AM    Hep C virus Ab Interp. NONREACTIVE 06/14/2018 10:09 AM     Lab Results   Component Value Date/Time    INR 1.0 06/14/2018 10:09 AM    aPTT 31.6 06/14/2018 10:09 AM    Fibrinogen 215 11/11/2016 12:04 PM       HepB/C negative  TTE 11/10/2016: EF 60%    Bone marrow biopsy 11/29/2016: Hypercellular marrow involved by mantle cell lymphoma    Bone marrow biopsy 5/23/17: no evidence of lymphoma    CT C/A/P 11/18/2020:  Slight decrease in size of left pelvic lymph nodes. No other change from the prior exam. Relatively stable left axillary and mediastinal lymph nodes    CT C/A/P 4/21/2021:  Mildly enlarged left axillary, mediastinal, and pelvic lymph nodes are stable or slightly decreased in size. Unchanged splenomegaly.     CT C/A/P 8/9/2021:  Stable mild left axillary, mediastinal, and pelvic lymphadenopathy. No new lymphadenopathy in the chest, abdomen, or pelvis. Decreased mild splenomegaly. CT C/A/P 1/4/2022:  Prominent and subcentimeter axillary, mediastinal and pelvic lymph nodes which are unchanged as compared to prior CT dated August 2021. Assessment:   1) Mantle cell lymphoma, relapsed  Stage IV  He completed chemotherapy with R-Bendamustine in 5/2017 and then declined consolidative transplant or maintenance rituximab. Unfortunately, he now has relapsed disease confirmed with biopsy of left axillary node. He is currently on second line therapy with acalabrutinib beginning 8/2020. He is tolerating therapy well with manageable toxicity. However, his platelets and Hgb have been steadily falling over the past few months. CT imaging obtained to evaluate for progression, found to be stable. However, labs worsening further today. This may be a treatment toxicity, so we will hold his acalabrutinib and repeat labs next week. If his labs do not improve with holding therapy, we may need to repeat his bone marrow biopsy. The patient will be seen every 6 weeks, and labs will be monitored to assess for toxicity from therapy. I previously offered referral to [facility] or [facility] for a second opinion and for discussion of trial options and/or CAR-T cell therapy, but he has declined for now. 2) DVT, PE  Diagnosed 11/11/2016. Malignancy associated. Previously on lovenox. Currently, on apixaban BID. I have recommended long-term anticoagulation. Refilled. Will HOLD if plt < 50.     3) Thrombocytopenia  Worsening now, with PLT down to 28. Hold apixaban. Hold acalabrutinib and repeat labs next week. 4) Anemia  New on past few lab checks. Worsening today. Hold acalabrutinib as above. 5) Port care  Continue port flushes every 6 weeks when obtaining labs. He prefers to have labs through port. He received flu vaccine and COVID booster.      Plan:     · HOLD Acalabrutinib 100mg BID  · HOLD apixiban 5mg PO BID  · Repeat CBC next week  · Port flush every 6 weeks with labs (CBC, CMP)  · Return to see me with labs in 6 weeks, or sooner pending labs next week    I personally saw and evaluated the patient and performed the key components of medical decision making. The history, physical exam, and documentation were performed by Marleni Jules NP. I reviewed and verified the above documentation and modified it as needed.     Signed By: Jose Triana MD

## 2022-02-10 NOTE — PROGRESS NOTES
Rashmi Patel is a 64 y.o. male follow up for mantle cell lymphoma. 1. Have you been to the ER, urgent care clinic since your last visit? Hospitalized since your last visit?no    2. Have you seen or consulted any other health care providers outside of the [address] [place] [address] since your last visit? Include any pap smears or colon screening.  No    Vitals 2/10/2022   Blood Pressure 123/60   Pulse 81   Temp 98.1   Resp 20   Height 5' 11\"   Weight 258 lb 1.6 oz   SpO2 93   BSA 2.42 m2   BMI 36 kg/m2   BP comment

## 2022-02-17 NOTE — TELEPHONE ENCOUNTER
[address] at [facility]  [phone]    02/17/22- Platelets 21. Patient notified. Instructed to continue holding eliquis and acalabrutinib, and repeat labs again next week. He verbalized understanding.

## 2022-02-17 NOTE — PROGRESS NOTES
[facility]patient [facility]fusion [facility] Short Visit Progress Note    1020 Patient admitted to [facility] for PF and labs ambulatory in stable condition. Assessment completed. No new concerns voiced. Vital Signs:  Visit Vitals  /62   Pulse 72   Temp 98.6 °F (37 °C)   Resp 20   Ht 5' 11\" (1.803 m)   Wt 116.7 kg (257 lb 3.2 oz)   SpO2 94%   BMI 35.87 kg/m²         *** with positive blood return. Lab Results:  ***LABS***        Medications:      *** Patient tolerated treatment well. Patient discharged from [place] [address] ambulatory in no distress at ***. Patient aware of next appointment.     Future Appointments   Date Time Provider Bola Jara   2/24/2022 11:00 AM SS INF7 CH2 <1H [facility]   3/24/2022 10:30 [facility]   3/24/2022 10:45 AM Quique CARDONA NP [facility] AMB   5/5/2022 10:30 AM [facility]   6/16/2022 10:30 AM SS INF4 CH4 <1H [facility]

## 2022-02-24 NOTE — TELEPHONE ENCOUNTER
Patients daughter called and left VM requesting a call back from team regarding patient.  Please advise     # [phone]

## 2022-02-24 NOTE — PROGRESS NOTES
[facility] Progress Note    Date: 2022    Name: Magda Maxwell    MRN: [id number]         : 1945    Mr. Hernandez Arrived ambulatory and in no distress for Blood Transfusion. Assessment & port access completed by HALEY Walker. Patient assessed at chairside by NP. Signs/symptoms of adverse blood reaction discussed with pt, voiced understanding. Mr. Judy Ayon vitals were reviewed. Patient Vitals for the past 12 hrs:   Temp Pulse Resp BP SpO2   22 1523 97.8 °F (36.6 °C) 78 20 (!) 108/56 98 %   22 1350 97.7 °F (36.5 °C) 84 20 103/60 98 %   22 1320 97.7 °F (36.5 °C) 76 20 (!) 105/57 95 %   22 1305 97.7 °F (36.5 °C) 80 20 (!) 112/59 96 %   22 1243 97.7 °F (36.5 °C) 79 20 (!) 103/57 95 %   22 1106 97.8 °F (36.6 °C) 80 20 (!) 117/54 94 %             1250:  1st unit PRBCs started and infusing without difficulty, observed x 15 minutes  1523:  1st unit completed without adverse reaction noted, NS flushing line. Mr. Vania Gonsalves tolerated treatment well and was discharged from [place] [address] in stable condition. After transfusion, patient reported improvement in his breathing. Port de-accessed, flushed & heparinized per protocol. D/C instructions reviewed, copy to pt, voiced understanding. Patient declined 1 hour post transfusion observation. He is to return on 3/01/22 for his next appointment.     Maya Mckay RN  2022

## 2022-02-24 NOTE — TELEPHONE ENCOUNTER
Spoke to patient's daughter. Discussed results of current lab work and concern for decline in labs. Planning lab work twice weekly, upcoming bone marrow biopsy and transfusion today. Advised he will need a  for bone marrow test. She is out of town next week and will ask her  to accompany him. Given the number to go ahead and get this scheduled at a time that is convienent for them. Hoping to have done next week. Discussed s/s of anemia and high risk of bleeding due to thrombocytopenia. Advised to watch for blood in urine, stools or nose bleeds. If he were to have injury or fall this would necessitate ED visit due to high bleeding risk. Patient's daughter states understanding and will communicate with patient's wife. Encouraged her to call with any further questions or concerns.

## 2022-02-24 NOTE — PROGRESS NOTES
Patient seen in infusion center due to abnormal labs. Hgb down to 6.3 today. Discussed concern for severe anemia. Patient reports BOND with mild activity, just walking a few feet will cause him to feel short winded. States he recovers within a few minutes. This symptom has been present since last week, worsening since Monday. Denies bleeding, no blood in urine, stools or nose bleeds. Denies easily bruising. No chest pain, headache, shortness of breath at rest. He drove himself here today and felt well with this activity. I have discussed with the patient the rationale for blood component transfusion; its benefits in treating or preventing fatigue, organ damage, or death; and its risk which includes mild transfusion reactions, rare risk of blood borne infection, or more serious but rare reactions. I have discussed the alternatives to transfusion, including the risk and consequences of not receiving transfusion. The patient had an opportunity to ask questions and had agreed to proceed with transfusion of blood components. He agrees to proceed with transfusion today. Type and cross obtained. Will repeat labs next week Tuesday. Discussed need to evaluate with bone marrow biopsy as well to determine why counts are dropping. He has been off Acalabrutinib the last 3 weeks. Continues to hold Eliquis. Recent CT imaging stable. Patient agreeable to plan. Transfusion today, labs repeated next week early. Bone marrow biopsy soon. Patient states his daughter, Sally Felix, will be reaching out for an update on his condition. He wants us to tell her anything that she wants to know about his diagnosis and current treatment.

## 2022-03-01 NOTE — TELEPHONE ENCOUNTER
Renae Carbajal Dr at [facility]  [phone]    His counts have steadily worsened even with holding therapy. This suggests his cytopenias are likely due to disease progression, rather than treatment toxicity. Bone marrow biopsy is scheduled for tomorrow. I called the patient. He reports his symptoms are returning. Loss of appetite, increased swelling, joint pains. No fevers or sweats. After the the bone marrow biopsy is completed, and while awaiting the results, I recommend he resume his acalbrutinib in the hopes that it may help to maintain some level of disease control while we work towards his next line of therapy. I also recommend initiating prednisone 80mg PO daily. We will check some autoimmune labs as well. Ideally the next step would be CAR-T cell therapy. However, I have offered referral to [facility] in the past and he has declined. We discussed again today and he will think about this. Alternatively we can consider therapy with velcade/rituximab/dex.

## 2022-03-01 NOTE — PROGRESS NOTES
1030 Pt arrived at [facility] ambulatory and in no acute distress for transfusion of 1 unit PRBCs and 1 unit platelets. Assessment completed, no new complaints voiced. Right chest port accessed without difficulty by Pari Rossi RN. Signs/symptoms of adverse blood reaction discussed with pt, voiced understanding. Patient Vitals for the past 12 hrs:   Temp Pulse Resp BP SpO2   03/01/22 1304 98.7 °F (37.1 °C) 70 20 (!) 98/57 94 %   03/01/22 1050 97.8 °F (36.6 °C) 89 20 (!) 104/57 98 %       Medications received:  Medications Administered     0.9% sodium chloride infusion     Admin Date  03/01/2022 Action  New Bag Dose  25 mL/hr Rate  25 mL/hr Route  IntraVENous Administered By  Santino Arreola RN                3001:  1st unit PRBCs started and infusing without difficulty, observed x 15 minutes  1540:  1st unit completed without adverse reaction noted, NS flushing line. 1550:  1st unit platelets started and infusing without difficulty, observed x 15 minutes  1605:  1st unit completed without adverse reaction noted, NS flushing line. 1625 Pt tolerated transfusion  well, no adverse reaction noted. D/C instructions reviewed, copy to pt, voiced understanding. Patient declined 1 hour post transfusion observation. D/Cd from [facility] ambulatory and in no acute distress.     Future Appointments   Date Time Provider Bola Estefani   3/2/2022 10:00 AM [facility] CT 2 SFMRCT [facility]   3/3/2022 10:30 AM [facility]   3/8/2022 10:30 AM [facility]   3/10/2022 10:30 AM [facility]   3/24/2022 10:30 AM [facility]   3/24/2022 10:45 AM Ezzie Remedies T, NP [facility] AMB   5/5/2022 10:30 AM [facility]   6/16/2022 10:30 AM SS [facility]4 [facility]

## 2022-03-02 NOTE — H&P
Radiology History and Physical    Patient: Yuly You 64 y.o. male       Chief Complaint: No chief complaint on file. History of Present Illness: 64year old male with lymphoma, presents for bone marrow biopsy. History:    Past Medical History:   Diagnosis Date    Arthritis     knees    Ill-defined condition 2015    episode of hypertension pcp felt it was related to lost of spouse no further episodes noted    Undulant fever     as a child     Family History   Problem Relation Age of Onset    Coronary Art Dis Father     Heart Disease Father     No Known Problems Brother     Cancer Sister      Social History     Socioeconomic History    Marital status:      Spouse name: Not on file    Number of children: Not on file    Years of education: Not on file    Highest education level: Not on file   Occupational History    Not on file   Tobacco Use    Smoking status: Former Smoker     Packs/day: 0.10     Years: 4.00     Pack years: 0.40     Quit date: 1972     Years since quittin.6    Smokeless tobacco: Never Used   Substance and Sexual Activity    Alcohol use: Not Currently     Alcohol/week: 2.0 standard drinks     Types: 2 Cans of beer per week    Drug use: No    Sexual activity: Never   Other Topics Concern    Not on file   Social History Narrative    Not on file     Social Determinants of Health     Financial Resource Strain:     Difficulty of Paying Living Expenses: Not on file   Food Insecurity:     Worried About Running Out of Food in the Last Year: Not on file    Rashawn of Food in the Last Year: Not on file   Transportation Needs:     Lack of Transportation (Medical): Not on file    Lack of Transportation (Non-Medical):  Not on file   Physical Activity:     Days of Exercise per Week: Not on file    Minutes of Exercise per Session: Not on file   Stress:     Feeling of Stress : Not on file   Social Connections:     Frequency of Communication with Friends and Family: Not on file    Frequency of Social Gatherings with Friends and Family: Not on file    Attends Rastafari Services: Not on file    Active Member of Clubs or Organizations: Not on file    Attends Club or Organization Meetings: Not on file    Marital Status: Not on file   Intimate Partner Violence:     Fear of Current or Ex-Partner: Not on file    Emotionally Abused: Not on file    Physically Abused: Not on file    Sexually Abused: Not on file   Housing Stability:     Unable to Pay for Housing in the Last Year: Not on file    Number of [place] in the Last Year: Not on file    Unstable Housing in the Last Year: Not on file       Allergies: No Known Allergies    Current Medications:  Current Outpatient Medications   Medication Sig    lidocaine-prilocaine (EMLA) topical cream Apply  to affected area as needed for Pain (Apply 30-60 min. prior to having your port accessed).  apixaban (Eliquis) 5 mg tablet TAKE 1 TABLET TWICE A DAY (Patient not taking: Reported on 2/17/2022)    acalabrutinib (Calquence) 100 mg cap Take 100 mg by mouth every twelve (12) hours. (Patient not taking: Reported on 2/17/2022)     Current Facility-Administered Medications   Medication Dose Route Frequency    midazolam (VERSED) injection 0.5-5 mg  0.5-5 mg IntraVENous Rad Multiple    fentaNYL citrate (PF) injection  mcg   mcg IntraVENous Rad Multiple    lidocaine (PF) (XYLOCAINE) 10 mg/mL (1 %) injection 5 mL  5 mL IntraVENous RAD ONCE    heparin (porcine) pf 300 Units  300 Units InterCATHeter PRN        Physical Exam:  Blood pressure (!) 93/50, pulse 81, resp. rate 26, SpO2 91 %.   GENERAL: alert, cooperative, no distress, appears stated age,   LUNG: Nonlabored respiration on room air  HEART: regular rate and rhythm    Alerts:    Hospital Problems  Date Reviewed: 2/10/2022    None          Laboratory:      Recent Labs     03/02/22  0920 03/01/22  1058 03/01/22  1058   HGB 6.7*   < > 6.2*   HCT 20.3*   < > 18.9*   WBC 3.1*   < > 3.6*   PLT 15*   < > 9*   BUN  --   --  34*   CREA  --   --  1.51*   K  --   --  3.9    < > = values in this interval not displayed. Plan of Care/Planned Procedure:  Risks, benefits, and alternatives reviewed with patient and he agrees to proceed with the procedure. CT Guided bone marrow biopsy    Deemed appropriate for moderate sedation with versed and fentanyl.     Dieudonne Sanches MD  Interventional Radiology  [facility]  10:29 AM, 3/2/2022

## 2022-03-02 NOTE — PROGRESS NOTES
0845 Pt brought back to [facility] for scheduled CT guided bone marrow biopsy. Confirmed NPO and ride. Rashida Holder 157 accessed using sterile technique. Labs drawn and sent to lab. Tim Yeung.  1000  Dr Luis Daniel Pickard in [facility]note 2] to discuss procedure and sedation plan with pt and RN. Allergies reviewed. Consent signed. 1025 Pt brought to CT for procedure. 1051 Dr Luis Daniel Pickard not available. Dr Jodi Jacobsen in CT to consent patient. Sedation started at 1052. Time out performed at  1056. Procedure started at 064 [id number]   and ended at 1110. Pt received a total of  2mg of versed and 50mcg of fentanyl over the course of procedure. Pt tolerated procedure well. Denies pain. Dressing to procedure site CDI. VS monitored throughout procedure. 1123 Pt returned to [facility] [place][note 2]. Given apple juice. [address] with Usman Rodríguezper, son-in-law, to let him know procedure complete and pt doing well. 1200 Reviewed discharge instructions with patient. Opportunity given for questions. Pt verbalized understanding. Copy provided. Flushed port with heparin and deaccessed. .   1210 Escorted pt out to vehicle in w/c for discharge to home with Usman De La Cruz.

## 2022-03-02 NOTE — DISCHARGE INSTRUCTIONS
Bone Marrow Aspiration and Biopsy: What to Expect at Home  Your Recovery  The biopsy site may feel sore for several days. It can help to walk, take pain medicine, and put ice packs on the site. You will probably be able to return to work and your usual activities the day after the procedure. Your doctor or nurse will call you with the results of your test.  This care sheet gives you a general idea about how long it will take for you to recover. But each person recovers at a different pace. Follow the steps below to get better as quickly as possible. How can you care for yourself at home? Activity    · Rest when you feel tired. Getting enough sleep will help you recover. · You may drive when you are no longer taking pain pills and can quickly move your foot from the gas pedal to the brake. You must also be able to sit comfortably for a long period of time, even if you do not plan to go far. You might get caught in traffic. · Most people are able to return to work the day after the procedure. Medicines    · Your doctor will tell you if and when you can restart your medicines. He or she will also give you instructions about taking any new medicines. · If you take blood thinners, such as warfarin (Coumadin), clopidogrel (Plavix), or aspirin, be sure to talk to your doctor. He or she will tell you if and when to start taking those medicines again. Make sure that you understand exactly what your doctor wants you to do. · Be safe with medicines. Take pain medicines exactly as directed. ? If the doctor gave you a prescription medicine for pain, take it as prescribed. ? If you are not taking a prescription pain medicine, take an over-the-counter medicine such as acetaminophen (Tylenol), ibuprofen (Advil, Motrin), or naproxen (Aleve). Read and follow all instructions on the label. ? Do not take two or more pain medicines at the same time unless the doctor told you to.  Many pain medicines have acetaminophen, which is Tylenol. Too much acetaminophen (Tylenol) can be harmful. · If you think your pain medicine is making you sick to your stomach:  ? Take your medicine after meals (unless your doctor has told you not to). ? Ask your doctor for a different pain medicine. · If your doctor prescribed antibiotics, take them as directed. Do not stop taking them just because you feel better. Ice    · Put ice or a cold pack on the biopsy site for 10 to 20 minutes at a time. Put a thin cloth between the ice and your skin. Follow-up care is a key part of your treatment and safety. Be sure to make and go to all appointments, and call your doctor if you are having problems. It's also a good idea to know your test results and keep a list of the medicines you take. When should you call for help? Call 911 anytime you think you may need emergency care. For example, call if:    · You passed out (lost consciousness). Call your doctor now or seek immediate medical care if:    · You have signs of infection, such as:  ? Increased pain, swelling, warmth, or redness. ? Red streaks leading from the biopsy site. ? Pus draining from the biopsy site. ? Swollen lymph nodes in your neck, armpits, or groin. ? A fever. Watch closely for any changes in your health, and be sure to contact your doctor if:    · You are not getting better as expected. Where can you learn more? Go to http://www.gray.com/. Enter E148 in the search box to learn more about \"Bone Marrow Aspiration and Biopsy: What to Expect at Home. \"  Current as of: September 10, 2017  Content Version: 11.8  © 4162-3141 [employer]. Care instructions adapted under license by [employer] (which disclaims liability or warranty for this information).  If you have questions about a medical condition or this instruction, always ask your healthcare professional. Walter Rubio disclaims any warranty or liability for your use of this information. If you have any questions or concerns please call [facility] at [phone] between the hours of 7:00 am and 3:30 pm or after hours call [phone]. Chelsey Rod RN, [facility]  Sedation for a Medical Procedure: After Your Visit  Instructions. Sedatives are used to relax you for a procedure. You may or may not be awake during the procedure. Common side effects of sedation medications include:                   Drowsiness, dizziness, euphoria, sleepiness or confusion. I              Unsteady gait. Loss of fine muscle control and delayed reaction time. Visual disturbances, difficulty focusing and blurred vision. Impaired memory recall. Follow-up care is a key component for your health and safety. Be sure to make and go to all medical appointments. Call your doctor if you are having problems. It's also a good idea to keep a list of your allergies, medicines with doses and test results on hand    Home care following your sedation procedure: You may experience some of these side effects or you may not be aware of subtle changes in your behavior or reaction time. Because you received these medications, we are giving you the following instructions: Activity    For your safety, you should not drive until the medicine wears off and you can think clearly and react easily. Do not drive for 24 hours.  Rest when you feel tired. Getting enough sleep will help you recover. Diet     You can eat your normal diet. If your stomach is upset, try bland, low-fat foods like plain rice, broiled chicken, toast, and yogurt.  Drink plenty of fluids unless your doctor advised you to limit your fluids.  Do not consume alcoholic beverages for 24 hours. Instructions   Do not make important personal, business, or legal decisions for 24 hours.  Move slowly and carefully, do not make sudden position changes.    Be alert for dizziness or lightheadedness and move accordingly.  Have a responsible person assist you.  Do not drive for 24 hours.  Do not operate equipment for 24 hours. [employer] mowers, power tools, kitchen appliances, etc.)    Discharge medications:   Resume prior to test medications as prescribed by your personal physician. If you have any questions or concerns call Radiology RN at [phone]  After hours call Radiology on-call at [phone], [facility]      Call [phone] any time you think you may need emergency care. For example:                Call if you passed out (lost consciousness).  The medicine is not wearing off and you cannot think clearly. Watch closely for changes in your health, and be sure to contact your doctor if you have any problems. Where can you learn more? Go to [employer]dTwist[employer]be   Enter G817 in the search box to learn more about \"Sedation for a Medical Procedure: After Your Visit. \"

## 2022-03-03 NOTE — PROGRESS NOTES
Outpatient Infusion Center Progress Note     9453 Pt admit to [facility] for Blood Transfusion ambulatory with rolator assist in stable condition. Assessment completed. No new concerns voiced. PAC with positive blood return. Visit Vitals  BP (!) 104/56   Pulse 80   Temp 98.7 °F (37.1 °C)   Resp 18   SpO2 94%     1310 First unit initiated. 1515 First unit completed. Patient Vitals for the past 12 hrs:   Temp Pulse Resp BP SpO2   03/03/22 1517 98.7 °F (37.1 °C) 80 18 (!) 104/56    03/03/22 1510 97.8 °F (36.6 °C) 78 18 (!) 107/52    03/03/22 1410 97.6 °F (36.4 °C) 82 18 (!) 101/53    03/03/22 1340 97.8 °F (36.6 °C) 80 18 (!) 99/51    03/03/22 1325 97.9 °F (36.6 °C) 78 18 (!) 107/54    03/03/22 1301 98 °F (36.7 °C) 79 20 (!) 109/55    03/03/22 1030 97.9 °F (36.6 °C) 92 22 (!) 123/54 94 %     Pt monitored post transfusion with no s/s of reaction. Educated and provided with written material regarding s/s of delayed reaction to watch for at home. Blood discharge instructions reviewed with patient. Pt politely declined one hour post transfusion observation. 1545 Pt tolerated treatment well. D/c home via rolator accompanied in no distress. Pt aware of next appointment scheduled for 3/8/22 @ 1030.

## 2022-03-03 NOTE — PROGRESS NOTES
OUTPATIENT INFUSION CENTER    DISCHARGE INSTRUCTIONS FOR:  BLOOD TRANSFUSION    We hope you are feeling better after your blood transfusion. Some mild tenderness or slight bruising at your IV site is normal.  Avoid lifting or heavy use of that extremity for the rest of the day. Drink plenty of fluids, eat a normal diet and get some rest.    There are some important signs that you need to watch for in case you experience a delayed reaction to the blood you have received. Call your physician immediately if you develop any of the following symptoms:    1. Severe headache or backache;    2. Fever above 100 degrees;    3. Chills;    4. Difficulty breathing;    5.  Blood or red color in urine;    6. The feeling of weakness or constant fatigue;    7. Yellowing of the whites of your eyes or skin (jaundice). If your physician is not available, call or go to the nearest emergency room, or dial 911.     Mi Wolf, Signature: ___________________________ 3/3/2022  Verónica Miller RN

## 2022-03-03 NOTE — TELEPHONE ENCOUNTER
[address] at [facility]  [phone]    I received a call from pathology, patient's bone marrow biopsy is consistent with AML. 35% blasts. I called and discussed with the patient, reviewed the incurable nature of acute leukemia and the grave prognosis. We reviewed goals of care, discussing hospice versus referral to [address] [place] [address] for management. He is interested in an opinion from [address] [place] [address], though he is not certain if he will want treatment. I instructed the patient to stop his acalabrutinib and prednisone. Continue to hold apixaban. He is not taking any other medications. I called and reviewed with Dr. Tristan Meehan at [address] [place] [address] who has agreed to see the patient next week. We will continue with transfusion support pending that visit.

## 2022-03-04 NOTE — TELEPHONE ENCOUNTER
Call to [address] [place] [address] for Leukemia and Lymphoma. They have received records faxed over today and after reviewing labs and bone marrow they are recommending admission there over the weekend for supportive care/transfusions. He will then plan to meet with attending physician on Monday to determine treatment options. Soonest they could see him outpatient is Wed, but there is concern about labs falling further over the weekend. Call to patient and wife over speaker phone. Patient states he is feeling well, no new symptoms to report. Denies bleeding. Discussed concern for new diagnosis and labs falling further over the weekend. He may be requiring daily transfusions at this point. [facility] is recommending admission there today to get further work up completed and transfusions completed. Patient is agreeable, states they need to call some family members to get him to the hospital and will work on that now. Call back to [facility]. Advised patient is agreeable to admission. She will call admissions to see if he can be direct admitted instead of having to go through ED and will call me back with recommendations. 1130: Call to attending physician on [address], Dr. Rebel Mayo to give report on patient. He requested CT imaging from Jan to be sent over and NGS and cytogenetics to be added to bone marrow biopsy and sent to them when results come in. Transfer center to call Mr. Hernandez to advise him when bed is ready to be direct admitted. 1245: Call from [facility]. They have bed ready for Mr. Maldonado and have called his wife to notify her for patient to be directed admitted to [address] room 158.

## 2022-03-04 NOTE — TELEPHONE ENCOUNTER
[address] at [facility]  [phone]    I received a call from Dr. Alejandro Mar at [facility]. He requests that we fax them the pending pathology results (cytogenetics, FISH, and NGS) when they are available.     Fax: [phone]  Attention: Dr. Alejandro Mar, Hematology

## 2022-03-15 NOTE — TELEPHONE ENCOUNTER
[address] at [facility]  [phone]    03/15/22- Called pt unable to reach pt-left a v/m. Wanting to check in on patient since being seen at [facility] for new leukemia diagnosis. 03/16/22- Patient call back he reported he is doing well \" I am currently admitted receiving treatment but they are happy with how things are going and I don't have any side effects\". He seems to be in good spirits and is expected to be hospitalized until the end of the week. No new concerns or questions.

## 2022-03-24 ENCOUNTER — APPOINTMENT (OUTPATIENT)
Dept: INFUSION THERAPY | Age: 77
End: 2022-03-24

## 2022-03-25 ENCOUNTER — TELEPHONE (OUTPATIENT)
Dept: ONCOLOGY | Age: 77
End: 2022-03-25

## 2022-03-25 NOTE — TELEPHONE ENCOUNTER
I called and spoke to Carolina Dec. She reports that he received chemotherapy for his leukemia at [address] [place] [address] and was discharged home. He had to return to the hospital the following day due to respiratory failure. He was diagnosed with PNA. His respiratory failure progressed and as he was DNR he was ultimately transitioned to comfort measures and  on 3/23/202. I told her that she could bring his chemo pills to the office and we would dispose of them for her.

## 2022-05-05 ENCOUNTER — APPOINTMENT (OUTPATIENT)
Dept: INFUSION THERAPY | Age: 77
End: 2022-05-05

## 2022-06-16 ENCOUNTER — APPOINTMENT (OUTPATIENT)
Dept: INFUSION THERAPY | Age: 77
End: 2022-06-16

## 2023-12-19 NOTE — PROGRESS NOTES
12/19/23 Spoke to pt.He has access to BP monitor. Will check it today and I will  call back tomorrow to get reading. PP   [id number] [facility]  [phone]    Hematology / Oncology Follow Up    Reason for Visit:   Margaret Schuler is a 74 y.o. male who is seen for follow up of lymphoma. Treatment History:   · CTA Chest 11/11/2016: PE in left lower lobe pulmonary arteries, extensive adneopathy  · CT A/P 11/12/2016: Cirrhosis, massive splenomegaly, ascites, massive lymphadenopathy  · US guided axillary node biopsy 11/14/2016: CD5+ B-cell non-hodgkin lymphoma, phenotype most consistent with mantle cell lymphoma. FISH with t(11;14)  · PET-CT 11/23/2016: Marked splenomegaly with increased metabolic activity consistent with lymphoma. Bilateral cervical and axillary adenopathy. Mediastinal and right hilar and left diaphragmatic adenopathy. Bilateral pelvic and inguinal adenopathy. Multiple small mesenteric and retroperitoneal nodes with low grade or no activity. · BMBx 11/29/2016: Hypercellular marrow involved by mantle cell lymphoma  · Stage IV Mantle Cell Lymphoma  · Palliative chemotherapy with Rituximab and Bendamustine x6 cycles from 12/8/2016 to 5/12/17  · Bone marrow biopsy 5/23/2017: no evidence of lymphoma  · PET/CT 6/7/2017: There has been marked improvement in the adenopathy in the cervical region, axilla, mediastinum, abdomen and pelvis which now demonstrate no increased metabolic activity. There has been marked decrease in the spleen which now demonstrates no increased metabolic activity. History of Present Illness:   He reports feeling even better, energy back to baseline. No recent infections. No pain. No fevers, chills, night sweats, weight loss, adenopathy. He has talked at length with his family, and they have decided against further therapy with transplant or maintenance rituximab. PAST HISTORY: The following sections were reviewed and updated in the EMR as appropriate: PMH, SH, FH, Medications, Allergies.     No Known Allergies     Review of Systems: A complete review of systems was obtained, reviewed, and scanned into the EMR. Pertinent findings reviewed above. Physical Exam:     Visit Vitals    /87 (BP 1 Location: Right arm, BP Patient Position: Sitting)    Pulse 65    Temp 96.8 °F (36 °C) (Oral)    Resp 16    Ht 5' 11\" (1.803 m)    Wt 231 lb 6.4 oz (105 kg)    SpO2 94%    BMI 32.27 kg/m2     ECOG PS: 0  General: No distress  Eyes: PERRLA, anicteric sclerae  HENT: Atraumatic, OP clear  Neck: Supple  Lymphatic: No cervical, supraclavicular, or inguinal adenopathy  Respiratory: CTAB, normal respiratory effort  CV: Normal rate, regular rhythm, no murmurs. Bilateral LE edema, 1+, compression stockings in place  GI: Soft, nontender, nondistended, no masses, no hepatomegaly, no splenomegaly  MS: Normal gait and station. Digits without clubbing or cyanosis. Skin: No rashes, ecchymoses, or petechiae. Normal temperature, turgor, and texture. Psych: Alert, oriented, appropriate affect, normal judgment/insight    Results:     Lab Results   Component Value Date/Time    WBC 2.6 05/23/2017 09:13 AM    HGB 11.3 05/23/2017 09:13 AM    HCT 34.7 05/23/2017 09:13 AM    PLATELET 620 40/47/2505 09:13 AM    MCV 89.0 05/23/2017 09:13 AM    ABS.  NEUTROPHILS 1.7 05/23/2017 09:13 AM    Hemoglobin (POC) 10.9 01/12/2017 08:02 AM    Hematocrit (POC) 32 01/12/2017 08:02 AM     Lab Results   Component Value Date/Time    Sodium 141 05/10/2017 01:05 PM    Potassium 4.6 05/10/2017 01:05 PM    Chloride 106 05/10/2017 01:05 PM    CO2 27 05/10/2017 01:05 PM    Glucose 89 05/10/2017 01:05 PM    BUN 27 05/10/2017 01:05 PM    Creatinine 1.10 05/10/2017 01:05 PM    GFR est AA >60 05/10/2017 01:05 PM    GFR est non-AA >60 05/10/2017 01:05 PM    Calcium 8.9 05/10/2017 01:05 PM    Sodium (POC) 143 01/12/2017 08:02 AM    Potassium (POC) 4.0 01/12/2017 08:02 AM    Chloride (POC) 104 01/12/2017 08:02 AM    Glucose (POC) 156 01/12/2017 08:02 AM    BUN (POC) 23 01/12/2017 08:02 AM    Creatinine (POC) 0.8 01/12/2017 08:02 AM    Calcium, ionized (POC) 1.19 01/12/2017 08:02 AM     Lab Results   Component Value Date/Time    Bilirubin, total 0.3 05/10/2017 01:05 PM    ALT (SGPT) 28 05/10/2017 01:05 PM    AST (SGOT) 24 05/10/2017 01:05 PM    Alk. phosphatase 103 05/10/2017 01:05 PM    Protein, total 6.9 05/10/2017 01:05 PM    Albumin 3.4 05/10/2017 01:05 PM    Globulin 3.5 05/10/2017 01:05 PM     Lab Results   Component Value Date/Time    Reticulocyte count 1.8 11/10/2016 02:00 PM    Iron % saturation 11 11/10/2016 02:00 PM    TIBC 240 11/10/2016 02:00 PM    Ferritin 100 11/10/2016 02:00 PM    Vitamin B12 415 11/10/2016 02:00 PM    Folate 15.6 11/10/2016 02:00 PM    Haptoglobin 186 11/11/2016 12:04 PM     11/10/2016 02:00 PM    Beta-2 Microglobulin, serum 4.4 11/15/2016 05:21 AM    TSH 4.06 11/10/2016 11:21 AM    Hep C  virus Ab Interp. NONREACTIVE 08/02/2016 08:54 AM     Lab Results   Component Value Date/Time    INR 1.1 11/11/2016 12:04 PM    aPTT 31.3 11/11/2016 12:04 PM    Fibrinogen 215 11/11/2016 12:04 PM       HepB/C negative  TTE 11/10/2016: EF 60%    Bone marrow biopsy 11/29/2016: Hypercellular marrow involved by mantle cell lymphoma    Bone marrow biopsy 5/23/17: no evidence of lymphoma    PET 6/7/17: no abnormal hypermetabolism    Assessment:   1) Mantle cell lymphoma  Stage IV  His MIPI score was 6.24, which is High Risk and cooresponds to a median survival of 37 months and 5-year OS of 20%. He has completed chemotherapy with R-Bendamustine for 6 cycles. He has had an excellent response to therapy based on repeat bone marrow biopsy and PET/CT. He has decided against transplant or maintenance rituximab. We will now transition to surveillance. I will see him back in a 4-6 weeks, then move to every 12 week visits/labs with scans every 6 months. 2) DVT, PE  Diagnosed 11/11/2016. Malignancy associated. He is growing tired of the lovenox injections.   As he is now in remission, and has no plans for transplant, I will transition him to apixaban. 3) Anemia  Mild on last check. Repeat with next port flush. 4) Thrombocytopenia  Mild. Secondary to splenomegaly/cirrhosis. Monitor.     5) Port care  Continue port flushes    Plan:     · D/c lovenox  · Start apixiban 5mg PO BID  · Port flush and labs every 12 weeks: CBC, CMP, LD  · CT every 6 months  · Return to see me in 4 weeks, then go to every 12 week visits      Signed By: Ivet Ewing MD     August 3, 2017